# Patient Record
Sex: MALE | Race: WHITE | NOT HISPANIC OR LATINO | Employment: OTHER | URBAN - METROPOLITAN AREA
[De-identification: names, ages, dates, MRNs, and addresses within clinical notes are randomized per-mention and may not be internally consistent; named-entity substitution may affect disease eponyms.]

---

## 2017-03-27 LAB — HEP C VIRUS AB: <0.1

## 2019-10-22 LAB
CHOLESTEROL, TOTAL: 207
HDLC SERPL-MCNC: 40 MG/DL
LDLC SERPL CALC-MCNC: 137 MG/DL (ref 0–160)
TRIGL SERPL-MCNC: 163 MG/DL

## 2020-01-27 ENCOUNTER — LAB VISIT (OUTPATIENT)
Dept: LAB | Facility: HOSPITAL | Age: 70
End: 2020-01-27
Attending: UROLOGY
Payer: MEDICARE

## 2020-01-27 ENCOUNTER — OFFICE VISIT (OUTPATIENT)
Dept: UROLOGY | Facility: CLINIC | Age: 70
End: 2020-01-27
Payer: MEDICARE

## 2020-01-27 VITALS — HEIGHT: 69 IN | WEIGHT: 278.25 LBS | BODY MASS INDEX: 41.21 KG/M2

## 2020-01-27 DIAGNOSIS — N13.8 ENLARGED PROSTATE WITH URINARY OBSTRUCTION: Primary | ICD-10-CM

## 2020-01-27 DIAGNOSIS — Z12.5 SCREENING FOR PROSTATE CANCER: ICD-10-CM

## 2020-01-27 DIAGNOSIS — N40.1 ENLARGED PROSTATE WITH URINARY OBSTRUCTION: Primary | ICD-10-CM

## 2020-01-27 DIAGNOSIS — Z80.42 FAMILY HISTORY OF PROSTATE CANCER: ICD-10-CM

## 2020-01-27 PROCEDURE — 99204 PR OFFICE/OUTPT VISIT, NEW, LEVL IV, 45-59 MIN: ICD-10-PCS | Mod: S$PBB,,, | Performed by: UROLOGY

## 2020-01-27 PROCEDURE — 1159F PR MEDICATION LIST DOCUMENTED IN MEDICAL RECORD: ICD-10-PCS | Mod: ,,, | Performed by: UROLOGY

## 2020-01-27 PROCEDURE — 99999 PR PBB SHADOW E&M-NEW PATIENT-LVL II: CPT | Mod: PBBFAC,,, | Performed by: UROLOGY

## 2020-01-27 PROCEDURE — 1126F AMNT PAIN NOTED NONE PRSNT: CPT | Mod: ,,, | Performed by: UROLOGY

## 2020-01-27 PROCEDURE — 99202 OFFICE O/P NEW SF 15 MIN: CPT | Mod: PBBFAC,PO | Performed by: UROLOGY

## 2020-01-27 PROCEDURE — 84153 ASSAY OF PSA TOTAL: CPT

## 2020-01-27 PROCEDURE — 1126F PR PAIN SEVERITY QUANTIFIED, NO PAIN PRESENT: ICD-10-PCS | Mod: ,,, | Performed by: UROLOGY

## 2020-01-27 PROCEDURE — 99204 OFFICE O/P NEW MOD 45 MIN: CPT | Mod: S$PBB,,, | Performed by: UROLOGY

## 2020-01-27 PROCEDURE — 36415 COLL VENOUS BLD VENIPUNCTURE: CPT | Mod: PO

## 2020-01-27 PROCEDURE — 1159F MED LIST DOCD IN RCRD: CPT | Mod: ,,, | Performed by: UROLOGY

## 2020-01-27 PROCEDURE — 99999 PR PBB SHADOW E&M-NEW PATIENT-LVL II: ICD-10-PCS | Mod: PBBFAC,,, | Performed by: UROLOGY

## 2020-01-27 RX ORDER — TAMSULOSIN HYDROCHLORIDE 0.4 MG/1
CAPSULE ORAL
COMMUNITY
Start: 2020-01-13 | End: 2020-01-27 | Stop reason: SDUPTHER

## 2020-01-27 RX ORDER — TAMSULOSIN HYDROCHLORIDE 0.4 MG/1
CAPSULE ORAL
Qty: 90 CAPSULE | Refills: 3 | Status: SHIPPED | OUTPATIENT
Start: 2020-01-27 | End: 2021-01-13

## 2020-01-27 RX ORDER — AMLODIPINE BESYLATE 10 MG/1
TABLET ORAL
COMMUNITY
Start: 2020-01-13 | End: 2020-01-27

## 2020-01-27 RX ORDER — LISINOPRIL AND HYDROCHLOROTHIAZIDE 12.5; 2 MG/1; MG/1
1 TABLET ORAL DAILY
COMMUNITY
End: 2021-02-01 | Stop reason: SDUPTHER

## 2020-01-27 RX ORDER — DAPAGLIFLOZIN 5 MG/1
5 TABLET, FILM COATED ORAL DAILY
COMMUNITY
End: 2021-07-16

## 2020-01-27 RX ORDER — ATORVASTATIN CALCIUM 40 MG/1
TABLET, FILM COATED ORAL
COMMUNITY
Start: 2020-01-16 | End: 2020-10-05 | Stop reason: SDUPTHER

## 2020-01-27 RX ORDER — FINASTERIDE 5 MG/1
5 TABLET, FILM COATED ORAL DAILY
Qty: 90 TABLET | Refills: 3 | Status: SHIPPED | OUTPATIENT
Start: 2020-01-27 | End: 2021-01-17

## 2020-01-27 RX ORDER — LEVOTHYROXINE SODIUM 150 UG/1
150 TABLET ORAL
COMMUNITY
End: 2021-02-10 | Stop reason: SDUPTHER

## 2020-01-27 RX ORDER — AMLODIPINE BESYLATE 10 MG/1
10 TABLET ORAL DAILY
COMMUNITY
End: 2020-11-13 | Stop reason: SDUPTHER

## 2020-01-27 RX ORDER — FINASTERIDE 5 MG/1
5 TABLET, FILM COATED ORAL DAILY
COMMUNITY
End: 2020-01-27 | Stop reason: SDUPTHER

## 2020-01-27 RX ORDER — METFORMIN HYDROCHLORIDE 1000 MG/1
1000 TABLET, FILM COATED, EXTENDED RELEASE ORAL
COMMUNITY
End: 2020-10-20 | Stop reason: SDUPTHER

## 2020-01-27 RX ORDER — ALLOPURINOL 100 MG/1
100 TABLET ORAL DAILY
COMMUNITY
End: 2021-04-14 | Stop reason: SDUPTHER

## 2020-01-27 RX ORDER — CLOTRIMAZOLE AND BETAMETHASONE DIPROPIONATE 10; .64 MG/G; MG/G
CREAM TOPICAL 2 TIMES DAILY
COMMUNITY

## 2020-01-27 RX ORDER — GLIPIZIDE 10 MG/1
5 TABLET, FILM COATED, EXTENDED RELEASE ORAL
COMMUNITY
End: 2021-09-14 | Stop reason: SDUPTHER

## 2020-01-27 RX ORDER — PEN NEEDLE, DIABETIC 30 GX3/16"
NEEDLE, DISPOSABLE MISCELLANEOUS
COMMUNITY
End: 2020-09-29 | Stop reason: SDUPTHER

## 2020-01-27 RX ORDER — METOPROLOL SUCCINATE 200 MG/1
200 TABLET, EXTENDED RELEASE ORAL DAILY
COMMUNITY
End: 2021-03-04 | Stop reason: SDUPTHER

## 2020-01-27 NOTE — PROGRESS NOTES
Subjective:       Patient ID: Tal Maldonado is a 69 y.o. male.    Chief Complaint: Annual Exam    HPI     69-year-old with BPH.  He has been maintained on Flomax and finasteride for greater than 10 years he estimates.  He just relocated from [place] and he is here to establish care.  He needs refill of his medications.  He does have a family history of prostate cancer.  His brother had a radical prostatectomy in the past.  His last PSA was less than 1 but that it has been over a year ago.  When he takes his Flomax and finasteride he has no bothersome urinary symptoms.  He denies hematuria and dysuria.  He has no previous urinary tract infections.    Past Medical History:   Diagnosis Date    BPH (benign prostatic hyperplasia)     Diabetes mellitus     Hypertension        Past Surgical History:   Procedure Laterality Date    APPENDECTOMY      HERNIA REPAIR      Umbilical    THYROIDECTOMY      TONSILLECTOMY           Current Outpatient Medications:     allopurinol (ZYLOPRIM) 100 MG tablet, Take 100 mg by mouth once daily., Disp: , Rfl:     amLODIPine (NORVASC) 10 MG tablet, Take 10 mg by mouth once daily., Disp: , Rfl:     atorvastatin (LIPITOR) 40 MG tablet, TK 1 T PO D, Disp: , Rfl:     blood sugar diagnostic (FREESTYLE LITE STRIPS MISC), by Misc.(Non-Drug; Combo Route) route., Disp: , Rfl:     clotrimazole-betamethasone 1-0.05% (LOTRISONE) cream, Apply topically 2 (two) times daily., Disp: , Rfl:     dapagliflozin (FARXIGA) 5 mg Tab tablet, Take 5 mg by mouth once daily., Disp: , Rfl:     finasteride (PROSCAR) 5 mg tablet, Take 1 tablet (5 mg total) by mouth once daily., Disp: 90 tablet, Rfl: 3    glipiZIDE (GLUCOTROL) 10 MG TR24, Take 5 mg by mouth daily with breakfast., Disp: , Rfl:     LANCETS MISC, by Misc.(Non-Drug; Combo Route) route., Disp: , Rfl:     levothyroxine (SYNTHROID) 150 MCG tablet, Take 150 mcg by mouth before breakfast., Disp: , Rfl:     liraglutide 0.6 mg/0.1 mL, 18 mg/3 mL,  "subq PNIJ (VICTOZA 3-MARIO) 0.6 mg/0.1 mL (18 mg/3 mL) PnIj, Inject 0.6 mg into the skin once daily., Disp: , Rfl:     lisinopril-hydrochlorothiazide (PRINZIDE,ZESTORETIC) 20-12.5 mg per tablet, Take 1 tablet by mouth once daily., Disp: , Rfl:     metFORMIN (GLUMETZA) 1000 MG (MOD) 24 hr tablet, Take 1,000 mg by mouth daily with breakfast., Disp: , Rfl:     metoprolol succinate (TOPROL-XL) 200 MG 24 hr tablet, Take 200 mg by mouth once daily., Disp: , Rfl:     pen needle, diabetic 32 gauge x 5/32" Ndle, by Misc.(Non-Drug; Combo Route) route., Disp: , Rfl:     tamsulosin (FLOMAX) 0.4 mg Cap, TK ONE C PO D 30 MIN AFTER A MEAL, Disp: 90 capsule, Rfl: 3    Review of Systems   Constitutional: Negative for fever.   Eyes: Negative for visual disturbance.   Respiratory: Negative for shortness of breath.    Cardiovascular: Negative for chest pain.   Gastrointestinal: Negative for nausea.   Genitourinary: Negative for dysuria and hematuria.   Musculoskeletal: Negative for gait problem.   Skin: Negative for rash.   Neurological: Negative for seizures.   Psychiatric/Behavioral: Negative for confusion.       Objective:      Physical Exam   Constitutional: He is oriented to person, place, and time. He appears well-developed and well-nourished.   HENT:   Head: Normocephalic and atraumatic.   Eyes: Conjunctivae are normal.   Cardiovascular: Normal rate.   Pulmonary/Chest: Effort normal.   Abdominal: Soft. Hernia confirmed negative in the right inguinal area and confirmed negative in the left inguinal area.   Genitourinary: Testes normal and penis normal. Rectal exam shows no mass and anal tone normal. Prostate is enlarged (40g, s/s/a). Prostate is not tender.   Musculoskeletal: Normal range of motion.   Neurological: He is alert and oriented to person, place, and time.   Skin: Skin is warm and dry. No rash noted.   Psychiatric: He has a normal mood and affect.   Vitals reviewed.      Assessment:       1. Enlarged prostate with " urinary obstruction    2. Screening for prostate cancer    3. Family history of prostate cancer        Plan:       Enlarged prostate with urinary obstruction    Screening for prostate cancer  -     PSA, Screening; Future; Expected date: 01/27/2020    Family history of prostate cancer    Other orders  -     finasteride (PROSCAR) 5 mg tablet; Take 1 tablet (5 mg total) by mouth once daily.  Dispense: 90 tablet; Refill: 3  -     tamsulosin (FLOMAX) 0.4 mg Cap; TK ONE C PO D 30 MIN AFTER A MEAL  Dispense: 90 capsule; Refill: 3      continue Flomax and finasteride.  Update PSA.  Follow-up 1 year

## 2020-01-28 LAB — COMPLEXED PSA SERPL-MCNC: 0.52 NG/ML (ref 0–4)

## 2020-04-01 ENCOUNTER — PATIENT OUTREACH (OUTPATIENT)
Dept: ADMINISTRATIVE | Facility: HOSPITAL | Age: 70
End: 2020-04-01

## 2020-04-01 NOTE — PROGRESS NOTES
Chart review completed 04/01/2020.  Care Everywhere updates requested and reviewed.  Immunizations reconciled. Media reviewed.       Not in lab angela

## 2020-04-15 ENCOUNTER — PATIENT MESSAGE (OUTPATIENT)
Dept: FAMILY MEDICINE | Facility: CLINIC | Age: 70
End: 2020-04-15

## 2020-04-15 ENCOUNTER — TELEPHONE (OUTPATIENT)
Dept: FAMILY MEDICINE | Facility: CLINIC | Age: 70
End: 2020-04-15

## 2020-04-15 NOTE — TELEPHONE ENCOUNTER
Called pt, he would like to see dr simon face to face since first time meeting him and we rescheduled.

## 2020-04-15 NOTE — TELEPHONE ENCOUNTER
----- Message from Nigel Tanner sent at 4/15/2020  2:46 PM CDT -----  Contact: pt  Pt is returning call to nurse, call placed to pod no answer   Call Back#[phone]  Thanks

## 2020-05-21 ENCOUNTER — PATIENT OUTREACH (OUTPATIENT)
Dept: ADMINISTRATIVE | Facility: HOSPITAL | Age: 70
End: 2020-05-21

## 2020-05-21 NOTE — PROGRESS NOTES
Chart review completed 05/21/2020.  Care Everywhere updates requested and reviewed.  Immunizations reconciled. Media reviewed.     Lab angela, and quest reviewed.   updated with external Hep C and Lipid panel report.   Message sent to patient's portal.    Health Maintenance Due   Topic Date Due    Hemoglobin A1c  1950    Foot Exam  07/03/1960    Eye Exam  07/03/1960    TETANUS VACCINE  07/03/1968    Shingles Vaccine (1 of 2) 07/03/2000    Colonoscopy  07/03/2000    Pneumococcal Vaccine (65+ Low/Medium Risk) (1 of 2 - PCV13) 07/03/2015

## 2020-06-04 ENCOUNTER — LAB VISIT (OUTPATIENT)
Dept: LAB | Facility: HOSPITAL | Age: 70
End: 2020-06-04
Attending: INTERNAL MEDICINE
Payer: MEDICARE

## 2020-06-04 ENCOUNTER — OFFICE VISIT (OUTPATIENT)
Dept: FAMILY MEDICINE | Facility: CLINIC | Age: 70
End: 2020-06-04
Payer: MEDICARE

## 2020-06-04 VITALS
SYSTOLIC BLOOD PRESSURE: 142 MMHG | HEART RATE: 82 BPM | BODY MASS INDEX: 41.9 KG/M2 | DIASTOLIC BLOOD PRESSURE: 76 MMHG | WEIGHT: 283.75 LBS | OXYGEN SATURATION: 95 %

## 2020-06-04 DIAGNOSIS — E66.01 MORBID OBESITY DUE TO EXCESS CALORIES: ICD-10-CM

## 2020-06-04 DIAGNOSIS — G47.33 OSA ON CPAP: ICD-10-CM

## 2020-06-04 DIAGNOSIS — M10.49 OTHER SECONDARY ACUTE GOUT OF MULTIPLE SITES: ICD-10-CM

## 2020-06-04 DIAGNOSIS — Z00.00 WELLNESS EXAMINATION: Primary | ICD-10-CM

## 2020-06-04 DIAGNOSIS — E78.49 OTHER HYPERLIPIDEMIA: ICD-10-CM

## 2020-06-04 DIAGNOSIS — M19.049 ARTHRITIS OF HAND: ICD-10-CM

## 2020-06-04 DIAGNOSIS — N40.0 BENIGN PROSTATIC HYPERPLASIA WITHOUT LOWER URINARY TRACT SYMPTOMS: ICD-10-CM

## 2020-06-04 DIAGNOSIS — D12.6 ADENOMATOUS POLYP OF COLON, UNSPECIFIED PART OF COLON: ICD-10-CM

## 2020-06-04 DIAGNOSIS — H90.42 SENSORINEURAL HEARING LOSS (SNHL) OF LEFT EAR WITH UNRESTRICTED HEARING OF RIGHT EAR: ICD-10-CM

## 2020-06-04 DIAGNOSIS — I80.00 SUPERFICIAL THROMBOPHLEBITIS OF LOWER EXTREMITY, UNSPECIFIED LATERALITY: ICD-10-CM

## 2020-06-04 DIAGNOSIS — I10 ESSENTIAL HYPERTENSION: ICD-10-CM

## 2020-06-04 DIAGNOSIS — E11.9 TYPE 2 DIABETES MELLITUS WITHOUT COMPLICATION, WITHOUT LONG-TERM CURRENT USE OF INSULIN: ICD-10-CM

## 2020-06-04 PROBLEM — M10.9 ACUTE GOUT OF MULTIPLE SITES: Status: ACTIVE | Noted: 2020-06-04

## 2020-06-04 LAB
ALBUMIN SERPL BCP-MCNC: 4.1 G/DL (ref 3.5–5.2)
ALBUMIN/CREAT UR: 15.9 UG/MG (ref 0–30)
ALP SERPL-CCNC: 64 U/L (ref 55–135)
ALT SERPL W/O P-5'-P-CCNC: 56 U/L (ref 10–44)
ANION GAP SERPL CALC-SCNC: 10 MMOL/L (ref 8–16)
AST SERPL-CCNC: 33 U/L (ref 10–40)
BASOPHILS # BLD AUTO: 0.05 K/UL (ref 0–0.2)
BASOPHILS NFR BLD: 0.8 % (ref 0–1.9)
BILIRUB SERPL-MCNC: 1.1 MG/DL (ref 0.1–1)
BUN SERPL-MCNC: 15 MG/DL (ref 8–23)
CALCIUM SERPL-MCNC: 9.6 MG/DL (ref 8.7–10.5)
CHLORIDE SERPL-SCNC: 103 MMOL/L (ref 95–110)
CHOLEST SERPL-MCNC: 154 MG/DL (ref 120–199)
CHOLEST/HDLC SERPL: 4.3 {RATIO} (ref 2–5)
CO2 SERPL-SCNC: 28 MMOL/L (ref 23–29)
CREAT SERPL-MCNC: 1 MG/DL (ref 0.5–1.4)
CREAT UR-MCNC: 69 MG/DL (ref 23–375)
DIFFERENTIAL METHOD: ABNORMAL
EOSINOPHIL # BLD AUTO: 0.1 K/UL (ref 0–0.5)
EOSINOPHIL NFR BLD: 1.6 % (ref 0–8)
ERYTHROCYTE [DISTWIDTH] IN BLOOD BY AUTOMATED COUNT: 12.8 % (ref 11.5–14.5)
EST. GFR  (AFRICAN AMERICAN): >60 ML/MIN/1.73 M^2
EST. GFR  (NON AFRICAN AMERICAN): >60 ML/MIN/1.73 M^2
GLUCOSE SERPL-MCNC: 173 MG/DL (ref 70–110)
HCT VFR BLD AUTO: 49.2 % (ref 40–54)
HDLC SERPL-MCNC: 36 MG/DL (ref 40–75)
HDLC SERPL: 23.4 % (ref 20–50)
HGB BLD-MCNC: 16.1 G/DL (ref 14–18)
IMM GRANULOCYTES # BLD AUTO: 0.04 K/UL (ref 0–0.04)
IMM GRANULOCYTES NFR BLD AUTO: 0.7 % (ref 0–0.5)
LDLC SERPL CALC-MCNC: 78.4 MG/DL (ref 63–159)
LYMPHOCYTES # BLD AUTO: 1.3 K/UL (ref 1–4.8)
LYMPHOCYTES NFR BLD: 20.6 % (ref 18–48)
MCH RBC QN AUTO: 31 PG (ref 27–31)
MCHC RBC AUTO-ENTMCNC: 32.7 G/DL (ref 32–36)
MCV RBC AUTO: 95 FL (ref 82–98)
MICROALBUMIN UR DL<=1MG/L-MCNC: 11 UG/ML
MONOCYTES # BLD AUTO: 0.5 K/UL (ref 0.3–1)
MONOCYTES NFR BLD: 8.6 % (ref 4–15)
NEUTROPHILS # BLD AUTO: 4.1 K/UL (ref 1.8–7.7)
NEUTROPHILS NFR BLD: 67.7 % (ref 38–73)
NONHDLC SERPL-MCNC: 118 MG/DL
NRBC BLD-RTO: 0 /100 WBC
PLATELET # BLD AUTO: 120 K/UL (ref 150–350)
PMV BLD AUTO: 11.3 FL (ref 9.2–12.9)
POTASSIUM SERPL-SCNC: 3.9 MMOL/L (ref 3.5–5.1)
PROT SERPL-MCNC: 6.6 G/DL (ref 6–8.4)
RBC # BLD AUTO: 5.19 M/UL (ref 4.6–6.2)
SARS-COV-2 IGG SERPLBLD QL IA.RAPID: NEGATIVE
SODIUM SERPL-SCNC: 141 MMOL/L (ref 136–145)
TRIGL SERPL-MCNC: 198 MG/DL (ref 30–150)
TSH SERPL DL<=0.005 MIU/L-ACNC: 1.08 UIU/ML (ref 0.4–4)
URATE SERPL-MCNC: 5.8 MG/DL (ref 3.4–7)
WBC # BLD AUTO: 6.07 K/UL (ref 3.9–12.7)

## 2020-06-04 PROCEDURE — 36415 COLL VENOUS BLD VENIPUNCTURE: CPT | Mod: PO

## 2020-06-04 PROCEDURE — 83036 HEMOGLOBIN GLYCOSYLATED A1C: CPT

## 2020-06-04 PROCEDURE — 99999 PR PBB SHADOW E&M-EST. PATIENT-LVL III: CPT | Mod: PBBFAC,,, | Performed by: INTERNAL MEDICINE

## 2020-06-04 PROCEDURE — 99999 PR PBB SHADOW E&M-EST. PATIENT-LVL III: ICD-10-PCS | Mod: PBBFAC,,, | Performed by: INTERNAL MEDICINE

## 2020-06-04 PROCEDURE — 80061 LIPID PANEL: CPT

## 2020-06-04 PROCEDURE — 85025 COMPLETE CBC W/AUTO DIFF WBC: CPT

## 2020-06-04 PROCEDURE — 82043 UR ALBUMIN QUANTITATIVE: CPT

## 2020-06-04 PROCEDURE — 86769 SARS-COV-2 COVID-19 ANTIBODY: CPT

## 2020-06-04 PROCEDURE — 84550 ASSAY OF BLOOD/URIC ACID: CPT

## 2020-06-04 PROCEDURE — 84443 ASSAY THYROID STIM HORMONE: CPT

## 2020-06-04 PROCEDURE — 99214 OFFICE O/P EST MOD 30 MIN: CPT | Mod: S$PBB,ICN,, | Performed by: INTERNAL MEDICINE

## 2020-06-04 PROCEDURE — 99213 OFFICE O/P EST LOW 20 MIN: CPT | Mod: PBBFAC,PO | Performed by: INTERNAL MEDICINE

## 2020-06-04 PROCEDURE — 80053 COMPREHEN METABOLIC PANEL: CPT

## 2020-06-04 PROCEDURE — 99214 PR OFFICE/OUTPT VISIT, EST, LEVL IV, 30-39 MIN: ICD-10-PCS | Mod: S$PBB,ICN,, | Performed by: INTERNAL MEDICINE

## 2020-06-04 RX ORDER — ASPIRIN 81 MG/1
81 TABLET ORAL DAILY
Refills: 0
Start: 2020-06-04 | End: 2026-01-29

## 2020-06-04 RX ORDER — INDOMETHACIN 50 MG/1
50 CAPSULE ORAL 2 TIMES DAILY PRN
Qty: 30 CAPSULE | Refills: 1 | Status: SHIPPED | OUTPATIENT
Start: 2020-06-04

## 2020-06-04 NOTE — PROGRESS NOTES
Patient ID: Tal Maldonado     Chief Complaint:   Chief Complaint   Patient presents with    Establish Care     needing labs done         HPI: New Patient to me. Was seen by Dr. Rivera for BPH and family history of Prostate cancer in brother. Wellness exam today and doing well. Needs labs and health maintenance. He has history of white coat hypertension. He's up to date on many shots but I want to see his previous PCP's labs and notes.     Review of Systems   Constitutional: Negative.    HENT: Negative.    Eyes: Negative.    Respiratory: Negative.    Cardiovascular: Negative.    Gastrointestinal: Negative.    Endocrine: Negative.    Genitourinary: Negative.    Musculoskeletal: Negative.    Skin: Negative.    Allergic/Immunologic: Negative.    Neurological: Negative.    Hematological: Negative.    Psychiatric/Behavioral: Negative.           Objective:      Physical Exam   Physical Exam   Constitutional: He is oriented to person, place, and time. He appears well-developed and well-nourished.   HENT:   Head: Normocephalic and atraumatic.   Nose: Nose normal.   Mouth/Throat: Oropharynx is clear and moist.   Eyes: Pupils are equal, round, and reactive to light. Conjunctivae and EOM are normal.   Neck: Normal range of motion. Neck supple.   Cardiovascular: Normal rate, regular rhythm, normal heart sounds and intact distal pulses.   Pulmonary/Chest: Effort normal and breath sounds normal.   Abdominal: Soft. Bowel sounds are normal.   Musculoskeletal: Normal range of motion.   Neurological: He is alert and oriented to person, place, and time.   Skin: Skin is warm and dry. Capillary refill takes less than 2 seconds.   Psychiatric: He has a normal mood and affect. His behavior is normal. Judgment and thought content normal.   Nursing note and vitals reviewed.      Current Outpatient Medications:     allopurinol (ZYLOPRIM) 100 MG tablet, Take 100 mg by mouth once daily., Disp: , Rfl:     amLODIPine (NORVASC) 10 MG tablet,  "Take 10 mg by mouth once daily., Disp: , Rfl:     atorvastatin (LIPITOR) 40 MG tablet, TK 1 T PO D, Disp: , Rfl:     blood sugar diagnostic (FREESTYLE LITE STRIPS MISC), by Misc.(Non-Drug; Combo Route) route., Disp: , Rfl:     clotrimazole-betamethasone 1-0.05% (LOTRISONE) cream, Apply topically 2 (two) times daily., Disp: , Rfl:     dapagliflozin (FARXIGA) 5 mg Tab tablet, Take 5 mg by mouth once daily., Disp: , Rfl:     finasteride (PROSCAR) 5 mg tablet, Take 1 tablet (5 mg total) by mouth once daily., Disp: 90 tablet, Rfl: 3    glipiZIDE (GLUCOTROL) 10 MG TR24, Take 5 mg by mouth daily with breakfast., Disp: , Rfl:     LANCETS MISC, by Misc.(Non-Drug; Combo Route) route., Disp: , Rfl:     levothyroxine (SYNTHROID) 150 MCG tablet, Take 150 mcg by mouth before breakfast., Disp: , Rfl:     liraglutide 0.6 mg/0.1 mL, 18 mg/3 mL, subq PNIJ (VICTOZA 3-MARIO) 0.6 mg/0.1 mL (18 mg/3 mL) PnIj, Inject 0.6 mg into the skin once daily., Disp: , Rfl:     lisinopril-hydrochlorothiazide (PRINZIDE,ZESTORETIC) 20-12.5 mg per tablet, Take 1 tablet by mouth once daily., Disp: , Rfl:     metFORMIN (GLUMETZA) 1000 MG (MOD) 24 hr tablet, Take 1,000 mg by mouth daily with breakfast., Disp: , Rfl:     metoprolol succinate (TOPROL-XL) 200 MG 24 hr tablet, Take 200 mg by mouth once daily., Disp: , Rfl:     pen needle, diabetic 32 gauge x 5/32" Ndle, by Misc.(Non-Drug; Combo Route) route., Disp: , Rfl:     tamsulosin (FLOMAX) 0.4 mg Cap, TK ONE C PO D 30 MIN AFTER A MEAL, Disp: 90 capsule, Rfl: 3    aspirin (ECOTRIN) 81 MG EC tablet, Take 1 tablet (81 mg total) by mouth once daily., Disp: , Rfl: 0         Vitals:   Vitals:    06/04/20 0801   BP: (!) 142/76   Patient Position: Sitting   Pulse: 82   SpO2: 95%   Weight: 128.7 kg (283 lb 11.7 oz)      Assessment:       Patient Active Problem List    Diagnosis Date Noted    Acute gout of multiple sites 06/04/2020    Morbid obesity due to excess calories 06/04/2020    BPH (benign " prostatic hyperplasia)     Diabetes mellitus     Hypertension     Other hyperlipidemia     Sensorineural hearing loss (SNHL) of left ear     WINSOME on CPAP     Colon polyps     Superficial thrombophlebitis     Arthritis of hand           Plan:       Tal Maldonado  was seen today for follow-up and may need lab work.    Diagnoses and all orders for this visit:    Tal was seen today for establish care.    Diagnoses and all orders for this visit:    Wellness examination    Type 2 diabetes mellitus without complication, without long-term current use of insulin  -     Ambulatory referral/consult to Podiatry; Future  -     Hemoglobin A1C; Future  -     Microalbumin/creatinine urine ratio; Future  -     TSH; Future    Benign prostatic hyperplasia without lower urinary tract symptoms  -     COVID-19 (SARS CoV-2) IgG Antibody; Future    Essential hypertension  -     CBC auto differential; Future  -     Comprehensive metabolic panel; Future    Other hyperlipidemia  -     Lipid Panel; Future    Sensorineural hearing loss (SNHL) of left ear with unrestricted hearing of right ear    WINSOME on CPAP  Using it and getting good benefit     Adenomatous polyp of colon, unspecified part of colon  Needs Colon next year     Other secondary acute gout of multiple sites  -     Uric acid; Future    Superficial thrombophlebitis of lower extremity, unspecified laterality  Take Aspirin 81 mg / day     Arthritis of hand    Morbid obesity due to excess calories  Monitor as he exercises    Other orders  -     aspirin (ECOTRIN) 81 MG EC tablet; Take 1 tablet (81 mg total) by mouth once daily.

## 2020-06-05 LAB
ESTIMATED AVG GLUCOSE: 151 MG/DL (ref 68–131)
HBA1C MFR BLD HPLC: 6.9 % (ref 4–5.6)

## 2020-06-07 DIAGNOSIS — D69.6 THROMBOCYTOPENIA: Primary | ICD-10-CM

## 2020-06-08 ENCOUNTER — OFFICE VISIT (OUTPATIENT)
Dept: PODIATRY | Facility: CLINIC | Age: 70
End: 2020-06-08
Payer: MEDICARE

## 2020-06-08 ENCOUNTER — TELEPHONE (OUTPATIENT)
Dept: FAMILY MEDICINE | Facility: CLINIC | Age: 70
End: 2020-06-08

## 2020-06-08 VITALS — WEIGHT: 283.75 LBS | BODY MASS INDEX: 42.03 KG/M2 | HEIGHT: 69 IN

## 2020-06-08 DIAGNOSIS — E66.01 MORBID OBESITY WITH BMI OF 40.0-44.9, ADULT: ICD-10-CM

## 2020-06-08 DIAGNOSIS — M20.41 HAMMER TOES OF BOTH FEET: ICD-10-CM

## 2020-06-08 DIAGNOSIS — Q82.8 POROKERATOSIS: ICD-10-CM

## 2020-06-08 DIAGNOSIS — E11.42 DIABETIC POLYNEUROPATHY ASSOCIATED WITH TYPE 2 DIABETES MELLITUS: Primary | ICD-10-CM

## 2020-06-08 DIAGNOSIS — M20.42 HAMMER TOES OF BOTH FEET: ICD-10-CM

## 2020-06-08 DIAGNOSIS — B35.1 ONYCHOMYCOSIS DUE TO DERMATOPHYTE: ICD-10-CM

## 2020-06-08 PROCEDURE — 99203 PR OFFICE/OUTPT VISIT, NEW, LEVL III, 30-44 MIN: ICD-10-PCS | Mod: 25,S$PBB,, | Performed by: PODIATRIST

## 2020-06-08 PROCEDURE — 11721 PR DEBRIDEMENT OF NAILS, 6 OR MORE: ICD-10-PCS | Mod: 59,Q9,S$PBB, | Performed by: PODIATRIST

## 2020-06-08 PROCEDURE — 11056 PR TRIM BENIGN HYPERKERATOTIC SKIN LESION,2-4: ICD-10-PCS | Mod: Q9,S$PBB,, | Performed by: PODIATRIST

## 2020-06-08 PROCEDURE — 11056 PARNG/CUTG B9 HYPRKR LES 2-4: CPT | Mod: PBBFAC,PN | Performed by: PODIATRIST

## 2020-06-08 PROCEDURE — 99213 OFFICE O/P EST LOW 20 MIN: CPT | Mod: PBBFAC,PN | Performed by: PODIATRIST

## 2020-06-08 PROCEDURE — 99203 OFFICE O/P NEW LOW 30 MIN: CPT | Mod: 25,S$PBB,, | Performed by: PODIATRIST

## 2020-06-08 PROCEDURE — 11721 DEBRIDE NAIL 6 OR MORE: CPT | Mod: PBBFAC,PN,59 | Performed by: PODIATRIST

## 2020-06-08 PROCEDURE — 99999 PR PBB SHADOW E&M-EST. PATIENT-LVL III: CPT | Mod: PBBFAC,,, | Performed by: PODIATRIST

## 2020-06-08 PROCEDURE — 11721 DEBRIDE NAIL 6 OR MORE: CPT | Mod: 59,Q9,S$PBB, | Performed by: PODIATRIST

## 2020-06-08 PROCEDURE — 99999 PR PBB SHADOW E&M-EST. PATIENT-LVL III: ICD-10-PCS | Mod: PBBFAC,,, | Performed by: PODIATRIST

## 2020-06-08 PROCEDURE — 11056 PARNG/CUTG B9 HYPRKR LES 2-4: CPT | Mod: Q9,S$PBB,, | Performed by: PODIATRIST

## 2020-06-08 NOTE — TELEPHONE ENCOUNTER
Spoke with patient and gave lab results and scheduled u/s wed. the 10th of June. Patient states verbal understanding.

## 2020-06-08 NOTE — LETTER
June 8, 2020      Marlon Iglesias MD  [address] [facility] [address] [place] [address]           [facility]  [address] [place] [address]  Phone: [phone]          Patient: Tal Maldonado   MR Number: [id number]   YOB: 1950   Date of Visit: 6/8/2020       Dear Dr. Marlon Iglesias:    Thank you for referring Tal Maldonado to me for evaluation. Attached you will find relevant portions of my assessment and plan of care.    If you have questions, please do not hesitate to call me. I look forward to following Tal Maldonado along with you.    Sincerely,    Alex Olson, KANIKA    Enclosure  CC:  No Recipients    If you would like to receive this communication electronically, please contact [email or web address] or [phone] to request more information on [employer] Link access.    For providers and/or their staff who would like to refer a patient to [facility], please contact us through our one-stop-shop provider referral line, Toby Medina, at [phone].    If you feel you have received this communication in error or would no longer like to receive these types of communications, please e-mail [email or web address]

## 2020-06-08 NOTE — TELEPHONE ENCOUNTER
----- Message from Marlon Iglesias MD sent at 6/7/2020  4:44 PM CDT -----  Majority of labs are very good: diabetes mellitus Controlled and Lipids almost Controlled (lower saturated fat in diet). Gout Controlled. COVID-19 antibody negative, so he never had it. Platelets low and 1 liver enzyme elevated- check Right Upper Quadrant Ultrasound as I'm concerned for liver disease.

## 2020-06-08 NOTE — PROGRESS NOTES
Subjective:      Patient ID: Tal Maldonado is a 69 y.o. male.    Chief Complaint: Diabetes Mellitus ([place]) and Diabetic Foot Exam    Tal is a 69 y.o. male who presents to the clinic upon referral from Dr. Iglesias  for evaluation and treatment of diabetic feet. Tal has a past medical history of Arthritis of hand, BPH (benign prostatic hyperplasia), Colon polyps, Diabetes mellitus, Hypertension, WINSOME on CPAP, Other hyperlipidemia, Sensorineural hearing loss (SNHL) of left ear, and Superficial thrombophlebitis. Patient relates no major problem with feet. Only complaints today consist of toenails in need of trimming.  Denies being painful with wearing shoe gear.  Has not attempted to self trim.  Denies having overt neuropathy pain in the feet.  Denies any additional pedal complaints.    PCP: Marlon Iglesias MD    Date Last Seen by PCP: 6/20    Hemoglobin A1C   Date Value Ref Range Status   06/04/2020 6.9 (H) 4.0 - 5.6 % Final     Comment:     ADA Screening Guidelines:  5.7-6.4%  Consistent with prediabetes  >or=6.5%  Consistent with diabetes  High levels of fetal hemoglobin interfere with the HbA1C  assay. Heterozygous hemoglobin variants (HbS, HgC, etc)do  not significantly interfere with this assay.   However, presence of multiple variants may affect accuracy.             Past Medical History:   Diagnosis Date    Arthritis of hand     BPH (benign prostatic hyperplasia)     Colon polyps     Diabetes mellitus     Hypertension     WINSOME on CPAP     Other hyperlipidemia     Sensorineural hearing loss (SNHL) of left ear     Superficial thrombophlebitis        Past Surgical History:   Procedure Laterality Date    APPENDECTOMY      DUPUYTREN CONTRACTURE RELEASE Bilateral     HERNIA REPAIR      Umbilical    TONSILLECTOMY         Family History   Problem Relation Age of Onset    Hypertension Mother     Diabetes Mother     Emphysema Father     Prostate cancer Brother        Social History     Socioeconomic  History    Marital status:      Spouse name: Not on file    Number of children: Not on file    Years of education: Not on file    Highest education level: Not on file   Occupational History    Occupation: Wine Distributor    Social Needs    Financial resource strain: Not hard at all    Food insecurity:     Worry: Never true     Inability: Never true    Transportation needs:     Medical: No     Non-medical: No   Tobacco Use    Smoking status: Never Smoker    Smokeless tobacco: Never Used   Substance and Sexual Activity    Alcohol use: Yes     Alcohol/week: 7.0 standard drinks     Types: 7 Glasses of wine per week     Frequency: 4 or more times a week     Drinks per session: 3 or 4     Binge frequency: Never    Drug use: Never    Sexual activity: Yes     Partners: Female   Lifestyle    Physical activity:     Days per week: 4 days     Minutes per session: 40 min    Stress: Not at all   Relationships    Social connections:     Talks on phone: More than three times a week     Gets together: More than three times a week     Attends Mandaeism service: Not on file     Active member of club or organization: Yes     Attends meetings of clubs or organizations: More than 4 times per year     Relationship status:    Other Topics Concern    Not on file   Social History Narrative    Not on file       Current Outpatient Medications   Medication Sig Dispense Refill    allopurinol (ZYLOPRIM) 100 MG tablet Take 100 mg by mouth once daily.      amLODIPine (NORVASC) 10 MG tablet Take 10 mg by mouth once daily.      aspirin (ECOTRIN) 81 MG EC tablet Take 1 tablet (81 mg total) by mouth once daily.  0    atorvastatin (LIPITOR) 40 MG tablet TK 1 T PO D      blood sugar diagnostic (FREESTYLE LITE STRIPS MISC) by Misc.(Non-Drug; Combo Route) route.      clotrimazole-betamethasone 1-0.05% (LOTRISONE) cream Apply topically 2 (two) times daily.      dapagliflozin (FARXIGA) 5 mg Tab tablet Take 5 mg by  "mouth once daily.      finasteride (PROSCAR) 5 mg tablet Take 1 tablet (5 mg total) by mouth once daily. 90 tablet 3    glipiZIDE (GLUCOTROL) 10 MG TR24 Take 5 mg by mouth daily with breakfast.      indomethacin (INDOCIN) 50 MG capsule Take 1 capsule (50 mg total) by mouth 2 (two) times daily as needed (gout flare). 30 capsule 1    LANCETS MISC by Misc.(Non-Drug; Combo Route) route.      levothyroxine (SYNTHROID) 150 MCG tablet Take 150 mcg by mouth before breakfast.      liraglutide 0.6 mg/0.1 mL, 18 mg/3 mL, subq PNIJ (VICTOZA 3-MARIO) 0.6 mg/0.1 mL (18 mg/3 mL) PnIj Inject 0.6 mg into the skin once daily.      lisinopril-hydrochlorothiazide (PRINZIDE,ZESTORETIC) 20-12.5 mg per tablet Take 1 tablet by mouth once daily.      metFORMIN (GLUMETZA) 1000 MG (MOD) 24 hr tablet Take 1,000 mg by mouth daily with breakfast.      metoprolol succinate (TOPROL-XL) 200 MG 24 hr tablet Take 200 mg by mouth once daily.      pen needle, diabetic 32 gauge x 5/32" Ndle by Misc.(Non-Drug; Combo Route) route.      tamsulosin (FLOMAX) 0.4 mg Cap TK ONE C PO D 30 MIN AFTER A MEAL 90 capsule 3     No current facility-administered medications for this visit.        Review of patient's allergies indicates:  No Known Allergies      Review of Systems   Constitution: Negative for chills and fever.   Cardiovascular: Positive for leg swelling. Negative for claudication.   Skin: Positive for color change and nail changes.   Musculoskeletal: Positive for joint swelling. Negative for joint pain, muscle cramps, muscle weakness and myalgias.   Neurological: Negative for numbness and paresthesias.   Psychiatric/Behavioral: Negative for altered mental status.           Objective:      Physical Exam   Constitutional: He is oriented to person, place, and time. He appears well-developed and well-nourished. No distress.   Cardiovascular:   Pulses:       Dorsalis pedis pulses are 2+ on the right side, and 2+ on the left side.        Posterior " tibial pulses are 2+ on the right side, and 2+ on the left side.   CFT is < 3 seconds bilateral.  Pedal hair growth is present bilateral.  Mild varicosities noted bilateral.  Moderate non pitting lower extremity edema noted bilateral.  Toes are warm to touch bilateral.     Musculoskeletal: He exhibits edema. He exhibits no tenderness.   Muscle strength 5/5 in all muscle groups bilateral.  No tenderness nor crepitation with ROM of foot/ankle joints bilateral.  No tenderness with palpation of bilateral foot and ankle.  Bilateral pes planus foot type.  Bilateral semi-rigid contracture of toes 2-5.   Neurological: He is alert and oriented to person, place, and time. He has normal strength. A sensory deficit is present.   Protective sensation per Manly-Derick monofilament is intact bilateral.  Vibratory sensation is decreased bilateral.  Light touch is intact bilateral.   Skin: Skin is warm, dry and intact. Capillary refill takes 2 to 3 seconds. Lesion noted. No abrasion, no bruising, no burn, no ecchymosis, no laceration and no rash noted. He is not diaphoretic. No erythema. No pallor.   Pedal skin appears edematous bilateral.  Toenails x 10 appear thickened by 2 mm's elongated by 4 mm's, and discolored with subungual debris.  Porokeratoses noted to the Lt. Forefoot (3 lesions) and the Rt. Forefoot (1 lesion). Examination of the skin reveals no evidence of significant maceration, rashes, open lesions, suspicious appearing nevi or other concerning lesions.              Assessment:       Encounter Diagnoses   Name Primary?    Diabetic polyneuropathy associated with type 2 diabetes mellitus Yes    Onychomycosis due to dermatophyte     Hammer toes of both feet     Porokeratosis     Morbid obesity with BMI of 40.0-44.9, adult          Plan:       Tal was seen today for diabetes mellitus and diabetic foot exam.    Diagnoses and all orders for this visit:    Diabetic polyneuropathy associated with type 2 diabetes  mellitus  -     Ambulatory referral/consult to Podiatry    Onychomycosis due to dermatophyte    Hammer toes of both feet    Porokeratosis    Morbid obesity with BMI of 40.0-44.9, adult      I counseled the patient on his conditions, their implications and medical management.    Advised to continue wearing shoe gear that accommodates digital deformities.    Discussed the importance of diet and exercise as they pertain to diabetes management and maintaining a healthy BMI.    Shoe inspection. Diabetic Foot Education. Patient reminded of the importance of good nutrition and blood sugar control to help prevent podiatric complications of diabetes. Patient instructed on proper foot hygeine. We discussed wearing proper shoe gear, daily foot inspections, never walking without protective shoe gear, never putting sharp instruments to feet    With patient's permission, nails were aggressively reduced and debrided x 10  to their soft tissue attachment mechanically and with electric , removing all offending nail and debris.  With the patient's verbal consent, a sterile #15 scalpel was used to trim lesions x 4 down to smooth appearing skin without incident.  Patient relates relief following the procedure. She will continue to monitor the areas daily, inspect her feet, wear protective shoe gear when ambulatory, moisturizer to maintain skin integrity and follow in this office in approximately 2-3 months, sooner p.r.n.    Follow up in about 3 months (around 9/8/2020).    Alex Olson DPM

## 2020-06-10 ENCOUNTER — HOSPITAL ENCOUNTER (OUTPATIENT)
Dept: RADIOLOGY | Facility: HOSPITAL | Age: 70
Discharge: HOME | End: 2020-06-10
Attending: INTERNAL MEDICINE
Payer: MEDICARE

## 2020-06-10 DIAGNOSIS — D69.6 THROMBOCYTOPENIA: ICD-10-CM

## 2020-06-10 PROCEDURE — 76705 ECHO EXAM OF ABDOMEN: CPT | Mod: 26,,, | Performed by: RADIOLOGY

## 2020-06-10 PROCEDURE — 76705 ECHO EXAM OF ABDOMEN: CPT | Mod: TC,PO

## 2020-06-10 PROCEDURE — 76705 US ABDOMEN LIMITED: ICD-10-PCS | Mod: 26,,, | Performed by: RADIOLOGY

## 2020-06-16 ENCOUNTER — TELEPHONE (OUTPATIENT)
Dept: FAMILY MEDICINE | Facility: CLINIC | Age: 70
End: 2020-06-16

## 2020-06-16 DIAGNOSIS — R73.01 IMPAIRED FASTING GLUCOSE: ICD-10-CM

## 2020-06-16 DIAGNOSIS — D69.6 THROMBOCYTOPENIA: Primary | ICD-10-CM

## 2020-06-16 NOTE — TELEPHONE ENCOUNTER
[facility] pharmacy sent a fax stating indomethacin cap 50 mg is not covered by pt insurance plan and alternatives are IBU tab, naproxen tab or etodolac cap. Please advise with strength, directions, quantity and refills.

## 2020-06-16 NOTE — TELEPHONE ENCOUNTER
----- Message from Marlon Iglesias MD sent at 6/15/2020 11:12 AM CDT -----  Ultrasound = enlarged liver due to fatty liver. I wonder if there is also a component of cirrhosis, but the spleen is not enlarged. Regardless, decrease fat in diet and we can repeat CBC later. Does he want to see a GI doctor for a second opinion on his enlarged liver?

## 2020-06-16 NOTE — TELEPHONE ENCOUNTER
Spoke with patient and he stated he did see his results on his mychart. He will watch what he eats and is starting an exercise program. He will wait on the GI at this time. He wanted to let you know that if you wanted him to do an A1c & CBC earlier then Dec. Just let him know.

## 2020-06-18 ENCOUNTER — TELEPHONE (OUTPATIENT)
Dept: FAMILY MEDICINE | Facility: CLINIC | Age: 70
End: 2020-06-18

## 2020-06-18 ENCOUNTER — PATIENT MESSAGE (OUTPATIENT)
Dept: FAMILY MEDICINE | Facility: CLINIC | Age: 70
End: 2020-06-18

## 2020-06-18 NOTE — TELEPHONE ENCOUNTER
----- Message from Jason Rajput sent at 6/18/2020  3:13 PM CDT -----  Type:  Patient Returning Call    Who Called:  Patient  Who Left Message for Patient:  Johanny  Does the patient know what this is regarding?:  Test strips-PA  Best Call Back Number:  [phone]  Additional Information:

## 2020-06-18 NOTE — TELEPHONE ENCOUNTER
----- Message from Jesus Deleon sent at 6/18/2020 12:30 PM CDT -----  Contact: Patient  Pt called and needs a refill for Free Style Glucose Strips    Please send to [facility] in [place]    Pt can be reached at [phone]

## 2020-06-19 ENCOUNTER — PATIENT MESSAGE (OUTPATIENT)
Dept: FAMILY MEDICINE | Facility: CLINIC | Age: 70
End: 2020-06-19

## 2020-06-19 NOTE — TELEPHONE ENCOUNTER
Yes, let's repeat a CBC and A1C in 3 months. Orders placed. Let's also have an office visit after that.

## 2020-06-23 ENCOUNTER — PATIENT MESSAGE (OUTPATIENT)
Dept: FAMILY MEDICINE | Facility: CLINIC | Age: 70
End: 2020-06-23

## 2020-06-23 NOTE — TELEPHONE ENCOUNTER
"Patent is scheduling on status of CMN form that "should" have been sent to pharmacy. It looks like that is the hopd up on getting his supplies?  "

## 2020-06-23 NOTE — TELEPHONE ENCOUNTER
Just spoke with the pharmacy again. There was confusion because the patient had his old prescriber in another state send a prescription for test strips. The pharmacy is calling Medicare to send the CMN to [facility].

## 2020-07-12 ENCOUNTER — HOSPITAL ENCOUNTER (EMERGENCY)
Dept: HOSPITAL 41 - JD.ED | Age: 70
Discharge: LEFT BEFORE DISCHARGE | End: 2020-07-12
Payer: MEDICARE

## 2020-07-12 DIAGNOSIS — I25.10: ICD-10-CM

## 2020-07-12 DIAGNOSIS — R06.00: ICD-10-CM

## 2020-07-12 DIAGNOSIS — I48.0: ICD-10-CM

## 2020-07-12 DIAGNOSIS — M79.661: ICD-10-CM

## 2020-07-12 DIAGNOSIS — M79.662: ICD-10-CM

## 2020-07-12 DIAGNOSIS — R05: ICD-10-CM

## 2020-07-12 DIAGNOSIS — I11.0: ICD-10-CM

## 2020-07-12 DIAGNOSIS — R11.2: ICD-10-CM

## 2020-07-12 DIAGNOSIS — M54.5: ICD-10-CM

## 2020-07-12 DIAGNOSIS — Z11.59: ICD-10-CM

## 2020-07-12 DIAGNOSIS — G89.29: ICD-10-CM

## 2020-07-12 DIAGNOSIS — Z87.891: ICD-10-CM

## 2020-07-12 DIAGNOSIS — I50.9: ICD-10-CM

## 2020-07-12 DIAGNOSIS — R07.9: Primary | ICD-10-CM

## 2020-07-12 PROCEDURE — 85027 COMPLETE CBC AUTOMATED: CPT

## 2020-07-12 PROCEDURE — 93005 ELECTROCARDIOGRAM TRACING: CPT

## 2020-07-12 PROCEDURE — 80053 COMPREHEN METABOLIC PANEL: CPT

## 2020-07-12 PROCEDURE — 83615 LACTATE (LD) (LDH) ENZYME: CPT

## 2020-07-12 PROCEDURE — 87040 BLOOD CULTURE FOR BACTERIA: CPT

## 2020-07-12 PROCEDURE — 85610 PROTHROMBIN TIME: CPT

## 2020-07-12 PROCEDURE — 84484 ASSAY OF TROPONIN QUANT: CPT

## 2020-07-12 PROCEDURE — U0002 COVID-19 LAB TEST NON-CDC: HCPCS

## 2020-07-12 PROCEDURE — 82728 ASSAY OF FERRITIN: CPT

## 2020-07-12 PROCEDURE — 85379 FIBRIN DEGRADATION QUANT: CPT

## 2020-07-12 PROCEDURE — 36600 WITHDRAWAL OF ARTERIAL BLOOD: CPT

## 2020-07-12 PROCEDURE — 99285 EMERGENCY DEPT VISIT HI MDM: CPT

## 2020-07-12 PROCEDURE — 83880 ASSAY OF NATRIURETIC PEPTIDE: CPT

## 2020-07-12 PROCEDURE — 85730 THROMBOPLASTIN TIME PARTIAL: CPT

## 2020-07-12 PROCEDURE — 83735 ASSAY OF MAGNESIUM: CPT

## 2020-07-12 PROCEDURE — 36415 COLL VENOUS BLD VENIPUNCTURE: CPT

## 2020-07-12 PROCEDURE — 82550 ASSAY OF CK (CPK): CPT

## 2020-07-12 PROCEDURE — 85007 BL SMEAR W/DIFF WBC COUNT: CPT

## 2020-07-12 PROCEDURE — 86140 C-REACTIVE PROTEIN: CPT

## 2020-07-12 PROCEDURE — 82803 BLOOD GASES ANY COMBINATION: CPT

## 2020-07-12 NOTE — EDM.PDOC
ED HPI GENERAL MEDICAL PROBLEM





- General


Chief Complaint: Respiratory Problem


Stated Complaint: COUGHING VOMITTING LEG PAIN AND CHEST PAIN


Time Seen by Provider: 07/12/20 19:33


Source of Information: Reports: Patient


History Limitations: Reports: No Limitations





- History of Present Illness


INITIAL COMMENTS - FREE TEXT/NARRATIVE: 





Mr. Turner is a pleasant 70-year-old gentleman with a past medical history 

significant for hypertension, dyslipidemia, coronary artery disease, status post

2 MIs, most recently in 2015, with 2 coronary stents, CHF, and possible COPD, 

all untreated for the past 8 months due to, according to the patient, lack of 

finances, who now presents the ED stating that he has had lower back pain on and

off for the past 2 years, but worse over the past few days, waxing and waning.  

He also states that he developed bilateral lower extremity pain, felt from his 

distal lateral thighs to his proximal lateral legs, since last night.  He states

that he developed sudden-onset left-sided chest pain and a nonproductive cough 

around noon today.  He describes his chest pain as achy and heavy in character. 

He states that he took 3 sublingual nitroglycerin around 17:30's evening, which 

improved his chest pain.  He states that he developed dyspnea around 14:00.  He 

then developed diaphoresis, and nausea with vomiting around 17:30 this evening. 

He states that he had similar symptoms about 1 week ago.





Here in the ED, the patient's initial BP is mildly elevated at 146/100, with a 

tachycardia of 108 bpm.  He is afebrile, saturating 91% on room air.





Other than his chronic lower back pain his more recent symptoms, as above, the 

patient denies recent fever, chills, sore throat, ear pain, nasal or sinus 

congestion, palpitations, constipation, diarrhea, abdominal pain, urinary 

symptoms, recent weight gain or weight loss, recent bloody bowel movements or 

black bowel movements, recent joint aches, headaches, or rashes.








The patient does not have a PCP or Cardiologist.








  ** Generalized


Pain Score (Numeric/FACES): 6





- Related Data


                                    Allergies











Allergy/AdvReac Type Severity Reaction Status Date / Time


 


No Known Allergies Allergy   Verified 07/12/20 19:24














Past Medical History


Cardiovascular History: Reports: Afib (paroxysmal, untreated since Dec 2019), 

CAD, Heart Failure (untreated since Dec 2019), High Cholesterol (untreated since

Dec 2019), Hypertension (untreated since Dec 2019), MI (x 2, last = 2015)


Respiratory History: Reports: COPD (suspected, not tested)


Musculoskeletal History: Reports: Osteoarthritis





- Past Surgical History


HEENT Surgical History: Reports: Oral Surgery (wisdom teeth extraction)


Cardiovascular Surgical History: Reports: Coronary Artery Stent (x 2), Other 

(See Below) (Coronary angiogram x 4)





Social & Family History





- Tobacco Use


Smoking Status *Q: Former Smoker


Tobacco Use Within Last Twelve Months: Smokeless Tobacco (1 pouch/day)


Years of Tobacco use: 51


Packs/Tins Daily: 2


Month/Year Tobacco Last Used: Quit 2016





- Caffeine Use


Caffeine Use: Reports: None





- Alcohol Use


Alcohol Use History: Yes


Alcohol Use Frequency: Socially





- Recreational Drug Use


Recreational Drug Use: No





- Living Situation & Occupation


Living situation: Reports: , Other (Homeless)


Occupation: Unemployed





ED ROS GENERAL





- Review of Systems


Review Of Systems: Comprehensive ROS is negative, except as noted in HPI.


Musculoskeletal: Reports: Back Pain (chronic/recurrent)





ED EXAM, GENERAL





- Physical Exam


Exam: See Below


Exam Limited By: No Limitations


General Appearance: Alert, WD/WN, No Apparent Distress


Eye Exam: Bilateral Eye: EOMI, Normal Inspection


Ears: Normal External Exam, Hearing Grossly Normal


Nose: Normal Inspection


Throat/Mouth: Normal Inspection, Normal Lips, Normal Voice, No Airway Compromise


Head: Atraumatic, Normocephalic


Neck: Normal Inspection, Full Range of Motion


Respiratory/Chest: No Respiratory Distress, Lungs Clear, Normal Breath Sounds, 

No Accessory Muscle Use, Chest Non-Tender (including the left chest)


Cardiovascular: Normal Peripheral Pulses, No Edema, No Gallop, No JVD, No 

Murmur, No Rub, Tachycardia (regular)


Peripheral Pulses: 3+: Radial (L), Radial (R)


GI/Abdominal: Normal Bowel Sounds, Soft, Non-Tender, No Organomegaly, No 

Distention, No Abnormal Bruit, No Mass


 (Male) Exam: Deferred


Rectal (Males) Exam: Deferred


Back Exam: Normal Inspection, Full Range of Motion


Extremities: Normal Inspection, Normal Range of Motion, No Pedal Edema, Normal 

Capillary Refill


Neurological: Alert, Oriented, Normal Cognition, No Motor/Sensory Deficits


Psychiatric: Normal Affect


Skin Exam: Warm, Dry, Intact, Normal Color, No Rash





EKG INTERPRETATION


EKG Date: 07/12/20


Time: 20:03


Rhythm: NSR


Rate (Beats/Min): 91


Axis: Normal


P-Wave: Present


QRS: Normal


ST-T: Normal


QT: Normal


Comparison: NA - No Prior EKG





Course





- Vital Signs


Last Recorded V/S: 


                                Last Vital Signs











Temp  37.9 C   07/12/20 19:31


 


Pulse  108 H  07/12/20 19:31


 


Resp  20   07/12/20 19:31


 


BP  146/100 H  07/12/20 19:31


 


Pulse Ox  91 L  07/12/20 19:31














- Orders/Labs/Meds


Orders: 


                               Active Orders 24 hr











 Category Date Time Status


 


 EKG Documentation Completion [RC] STAT Care  07/12/20 19:53 Active


 


 CULTURE BLOOD [BC] Stat Lab  07/12/20 20:30 Received


 


 Blood Culture x2 Reflex Set [OM.PC] Stat Oth  07/12/20 19:53 Ordered











Labs: 


                                Laboratory Tests











  07/12/20 07/12/20 07/12/20 Range/Units





  19:53 20:09 20:30 


 


WBC     (4.23-9.07)  K/mm3


 


RBC     (4.63-6.08)  M/mm3


 


Hgb     (13.7-17.5)  gm/dl


 


Hct     (40.1-51.0)  %


 


MCV     (79.0-92.2)  fl


 


MCH     (25.7-32.2)  pg


 


MCHC     (32.2-35.5)  g/dl


 


RDW Std Deviation     (35.1-43.9)  fL


 


Plt Count     (163-337)  K/mm3


 


MPV     (9.4-12.3)  fl


 


Neutrophils % (Manual)     (40-60)  %


 


Band Neutrophils %     (0-10)  %


 


Lymphocytes % (Manual)     (20-40)  %


 


Atypical Lymphs %     %


 


Monocytes % (Manual)     (2-10)  %


 


Eosinophils % (Manual)     (0.8-7.0)  %


 


Basophils % (Manual)     (0.2-1.2)  


 


Platelet Estimate     


 


RBC Morph Comment     


 


PT     (9.7-12.0)  SECONDS


 


INR     


 


APTT     (22-31)  SECONDS


 


D-Dimer, Quantitative     (0.19-0.50)  mg/L


 


Puncture Site  Lt radial    


 


ABG pH  7.43    (7.35-7.45)  


 


ABG pCO2  35.4    (35.0-45.0)  mmHg


 


ABG pO2  64.0 L    (80.0-100.0)  mmHg


 


ABG HCO3  23.0    (22.0-26.0)  meq/L


 


ABG O2 Saturation  91.2 L    (96.0-97.0)  %


 


ABG Base Excess  -0.3    (-2-2.0)  


 


Donnie Test  Positive    


 


O2 Delivery Device  Room air    


 


FiO2  21.00    (21..00)  %


 


Sodium     (136-145)  mEq/L


 


Potassium     (3.5-5.1)  mEq/L


 


Chloride     ()  mEq/L


 


Carbon Dioxide     (21-32)  mEq/L


 


Anion Gap     (5-15)  


 


BUN     (7-18)  mg/dL


 


Creatinine     (0.7-1.3)  mg/dL


 


Est Cr Clr Drug Dosing     


 


Estimated GFR (MDRD)     (>60)  mL/min


 


BUN/Creatinine Ratio     (14-18)  


 


Glucose     ()  mg/dL


 


Calcium     (8.5-10.1)  mg/dL


 


Magnesium     (1.8-2.4)  mg/dl


 


Ferritin     ()  ng/ml


 


Total Bilirubin     (0.2-1.0)  mg/dL


 


AST     (15-37)  U/L


 


ALT     (16-63)  U/L


 


Alkaline Phosphatase     ()  U/L


 


Lactate Dehydrogenase     ()  U/L


 


Creatine Kinase     ()  U/L


 


Troponin I     (0.00-0.056)  ng/mL


 


C-Reactive Protein    <0.2  (<1.0)  mg/dL


 


NT-Pro-B Natriuret Pep     (0-125)  pg/mL


 


Total Protein     (6.4-8.2)  g/dl


 


Albumin     (3.4-5.0)  g/dl


 


Globulin     gm/dL


 


Albumin/Globulin Ratio     (1-2)  


 


SARS Virus RNA (PCR)   Negative   (NEGATIVE)  














  07/12/20 07/12/20 07/12/20 Range/Units





  20:30 20:30 20:30 


 


WBC  24.57 H    (4.23-9.07)  K/mm3


 


RBC  4.96    (4.63-6.08)  M/mm3


 


Hgb  15.2    (13.7-17.5)  gm/dl


 


Hct  45.8    (40.1-51.0)  %


 


MCV  92.3 H    (79.0-92.2)  fl


 


MCH  30.6    (25.7-32.2)  pg


 


MCHC  33.2    (32.2-35.5)  g/dl


 


RDW Std Deviation  46.6 H    (35.1-43.9)  fL


 


Plt Count  258    (163-337)  K/mm3


 


MPV  10.4    (9.4-12.3)  fl


 


Neutrophils % (Manual)  25 L    (40-60)  %


 


Band Neutrophils %  0    (0-10)  %


 


Lymphocytes % (Manual)  66 H    (20-40)  %


 


Atypical Lymphs %  4    %


 


Monocytes % (Manual)  3    (2-10)  %


 


Eosinophils % (Manual)  2    (0.8-7.0)  %


 


Basophils % (Manual)  0 L    (0.2-1.2)  


 


Platelet Estimate  Adequate    


 


RBC Morph Comment  Normal    


 


PT   10.7   (9.7-12.0)  SECONDS


 


INR   0.98   


 


APTT   24   (22-31)  SECONDS


 


D-Dimer, Quantitative   0.41   (0.19-0.50)  mg/L


 


Puncture Site     


 


ABG pH     (7.35-7.45)  


 


ABG pCO2     (35.0-45.0)  mmHg


 


ABG pO2     (80.0-100.0)  mmHg


 


ABG HCO3     (22.0-26.0)  meq/L


 


ABG O2 Saturation     (96.0-97.0)  %


 


ABG Base Excess     (-2-2.0)  


 


Donnie Test     


 


O2 Delivery Device     


 


FiO2     (21..00)  %


 


Sodium    142  (136-145)  mEq/L


 


Potassium    3.7  (3.5-5.1)  mEq/L


 


Chloride    106  ()  mEq/L


 


Carbon Dioxide    25  (21-32)  mEq/L


 


Anion Gap    14.7  (5-15)  


 


BUN    13  (7-18)  mg/dL


 


Creatinine    1.1  (0.7-1.3)  mg/dL


 


Est Cr Clr Drug Dosing    TNP  


 


Estimated GFR (MDRD)    > 60  (>60)  mL/min


 


BUN/Creatinine Ratio    11.8 L  (14-18)  


 


Glucose    97  ()  mg/dL


 


Calcium    9.0  (8.5-10.1)  mg/dL


 


Magnesium    2.1  (1.8-2.4)  mg/dl


 


Ferritin     ()  ng/ml


 


Total Bilirubin    0.7  (0.2-1.0)  mg/dL


 


AST    17  (15-37)  U/L


 


ALT    19  (16-63)  U/L


 


Alkaline Phosphatase    74  ()  U/L


 


Lactate Dehydrogenase    196  ()  U/L


 


Creatine Kinase    101  ()  U/L


 


Troponin I    < 0.017  (0.00-0.056)  ng/mL


 


C-Reactive Protein     (<1.0)  mg/dL


 


NT-Pro-B Natriuret Pep     (0-125)  pg/mL


 


Total Protein    7.4  (6.4-8.2)  g/dl


 


Albumin    4.1  (3.4-5.0)  g/dl


 


Globulin    3.3  gm/dL


 


Albumin/Globulin Ratio    1.2  (1-2)  


 


SARS Virus RNA (PCR)     (NEGATIVE)  














  07/12/20 07/12/20 Range/Units





  20:30 20:30 


 


WBC    (4.23-9.07)  K/mm3


 


RBC    (4.63-6.08)  M/mm3


 


Hgb    (13.7-17.5)  gm/dl


 


Hct    (40.1-51.0)  %


 


MCV    (79.0-92.2)  fl


 


MCH    (25.7-32.2)  pg


 


MCHC    (32.2-35.5)  g/dl


 


RDW Std Deviation    (35.1-43.9)  fL


 


Plt Count    (163-337)  K/mm3


 


MPV    (9.4-12.3)  fl


 


Neutrophils % (Manual)    (40-60)  %


 


Band Neutrophils %    (0-10)  %


 


Lymphocytes % (Manual)    (20-40)  %


 


Atypical Lymphs %    %


 


Monocytes % (Manual)    (2-10)  %


 


Eosinophils % (Manual)    (0.8-7.0)  %


 


Basophils % (Manual)    (0.2-1.2)  


 


Platelet Estimate    


 


RBC Morph Comment    


 


PT    (9.7-12.0)  SECONDS


 


INR    


 


APTT    (22-31)  SECONDS


 


D-Dimer, Quantitative    (0.19-0.50)  mg/L


 


Puncture Site    


 


ABG pH    (7.35-7.45)  


 


ABG pCO2    (35.0-45.0)  mmHg


 


ABG pO2    (80.0-100.0)  mmHg


 


ABG HCO3    (22.0-26.0)  meq/L


 


ABG O2 Saturation    (96.0-97.0)  %


 


ABG Base Excess    (-2-2.0)  


 


Donnie Test    


 


O2 Delivery Device    


 


FiO2    (21..00)  %


 


Sodium    (136-145)  mEq/L


 


Potassium    (3.5-5.1)  mEq/L


 


Chloride    ()  mEq/L


 


Carbon Dioxide    (21-32)  mEq/L


 


Anion Gap    (5-15)  


 


BUN    (7-18)  mg/dL


 


Creatinine    (0.7-1.3)  mg/dL


 


Est Cr Clr Drug Dosing    


 


Estimated GFR (MDRD)    (>60)  mL/min


 


BUN/Creatinine Ratio    (14-18)  


 


Glucose    ()  mg/dL


 


Calcium    (8.5-10.1)  mg/dL


 


Magnesium    (1.8-2.4)  mg/dl


 


Ferritin   37  ()  ng/ml


 


Total Bilirubin    (0.2-1.0)  mg/dL


 


AST    (15-37)  U/L


 


ALT    (16-63)  U/L


 


Alkaline Phosphatase    ()  U/L


 


Lactate Dehydrogenase    ()  U/L


 


Creatine Kinase    ()  U/L


 


Troponin I    (0.00-0.056)  ng/mL


 


C-Reactive Protein    (<1.0)  mg/dL


 


NT-Pro-B Natriuret Pep  247 H   (0-125)  pg/mL


 


Total Protein    (6.4-8.2)  g/dl


 


Albumin    (3.4-5.0)  g/dl


 


Globulin    gm/dL


 


Albumin/Globulin Ratio    (1-2)  


 


SARS Virus RNA (PCR)    (NEGATIVE)  














- Re-Assessments/Exams


Free Text/Narrative Re-Assessment/Exam: 





07/12/20 19:57


As above, the patient has had waxing waning lower back pain for about 2 years, 

with recurrence the past few days, then developed bilateral lower extremity pain

 last night, a nonproductive cough and left-sided chest pain around noon today, 

followed by dyspnea around 14:00, then nausea, vomiting, and diaphoresis around 

17:30.  His initial vitals found him to be tachycardic with an oxygen saturation

 of only 91% on room air.  His physical exam is grossly unremarkable.  I have 

ordered an extensive work-up that includes blood work, 2 sets of blood cultures,

 an ABG, a chest x-ray, an ECG, and a test for the SARS-CoV-2 virus.








07/12/20 20:39


Notified by Grisel THOMAS that the patient would like to go AMA.  There does not 

appear to be any precipitating reason why he wants to leave, he just does.





The only test results I have back at this time are his ABG, which shows some 

hypoxemia, but no significant acid-base disturbances, and his ECG which shows a 

normal sinus rhythm and no ischemic changes.








07/12/20 20:46


I dictated discharge instructions, however, it appears that the patient has 

already left the ED.





Departure





- Departure


Time of Disposition: 20:43


Disposition: Against Medical Advice 07


Condition: Good


Clinical Impression: 


 Chest pain, Cough, Dyspnea, Nausea & vomiting, Chronic back pain, Lower 

extremity pain, bilateral








- Discharge Information


*PRESCRIPTION DRUG MONITORING PROGRAM REVIEWED*: Not Applicable


*COPY OF PRESCRIPTION DRUG MONITORING REPORT IN PATIENT MANDA: Not Applicable


Referrals: 


PCP,None [Primary Care Provider] - 


Forms:  ED Department Discharge


Additional Instructions: 


You were seen in the emergency room for a cough with shortness of breath, left-

sided chest pain, nausea and vomiting, lower back pain, and pain to the sides of

both of your lower extremities.





An extensive work-up was ordered, including blood work, sets of blood cultures, 

an arterial blood gas, a chest x-ray, an ECG, and a test for the coronavirus.  

All of these were collected, however, the only test I have back so far is your 

blood gas and your ECG, both of which returned unremarkable.  The remaining 

tests are all still pending.





Without the test results, I cannot say what is the cause of your symptoms, 

however, you have decided to leave AGAINST MEDICAL ADVICE.





If you change your mind and would like to be evaluated, please do not hesitate 

to return to the ER.





Sepsis Event Note (ED)





- Evaluation


Sepsis Screening Result: Possible Sepsis Risk





- Focused Exam


Vital Signs: 


                                   Vital Signs











  Temp Pulse Resp BP Pulse Ox


 


 07/12/20 19:31  37.9 C  108 H  20  146/100 H  91 L














- My Orders


Last 24 Hours: 


My Active Orders





07/12/20 19:53


EKG Documentation Completion [RC] STAT 


Blood Culture x2 Reflex Set [OM.PC] Stat 





07/12/20 20:30


CULTURE BLOOD [BC] Stat 














- Assessment/Plan


Last 24 Hours: 


My Active Orders





07/12/20 19:53


EKG Documentation Completion [RC] STAT 


Blood Culture x2 Reflex Set [OM.PC] Stat 





07/12/20 20:30


CULTURE BLOOD [BC] Stat

## 2020-09-03 DIAGNOSIS — E11.9 TYPE 2 DIABETES MELLITUS WITHOUT COMPLICATION, UNSPECIFIED WHETHER LONG TERM INSULIN USE: ICD-10-CM

## 2020-09-08 ENCOUNTER — OFFICE VISIT (OUTPATIENT)
Dept: PODIATRY | Facility: CLINIC | Age: 70
End: 2020-09-08
Payer: MEDICARE

## 2020-09-08 VITALS — HEIGHT: 69 IN | WEIGHT: 283.75 LBS | BODY MASS INDEX: 42.03 KG/M2

## 2020-09-08 DIAGNOSIS — M20.41 HAMMER TOES OF BOTH FEET: ICD-10-CM

## 2020-09-08 DIAGNOSIS — B35.1 ONYCHOMYCOSIS DUE TO DERMATOPHYTE: ICD-10-CM

## 2020-09-08 DIAGNOSIS — Q82.8 POROKERATOSIS: ICD-10-CM

## 2020-09-08 DIAGNOSIS — M20.42 HAMMER TOES OF BOTH FEET: ICD-10-CM

## 2020-09-08 DIAGNOSIS — E66.01 MORBID OBESITY WITH BMI OF 40.0-44.9, ADULT: ICD-10-CM

## 2020-09-08 DIAGNOSIS — E11.42 DIABETIC POLYNEUROPATHY ASSOCIATED WITH TYPE 2 DIABETES MELLITUS: Primary | ICD-10-CM

## 2020-09-08 PROCEDURE — 11721 DEBRIDE NAIL 6 OR MORE: CPT | Mod: Q9,PBBFAC,PN | Performed by: PODIATRIST

## 2020-09-08 PROCEDURE — 11056 PARNG/CUTG B9 HYPRKR LES 2-4: CPT | Mod: PBBFAC,PN | Performed by: PODIATRIST

## 2020-09-08 PROCEDURE — 99999 PR PBB SHADOW E&M-EST. PATIENT-LVL II: ICD-10-PCS | Mod: PBBFAC,,, | Performed by: PODIATRIST

## 2020-09-08 PROCEDURE — 99499 UNLISTED E&M SERVICE: CPT | Mod: S$PBB,,, | Performed by: PODIATRIST

## 2020-09-08 PROCEDURE — 11721 DEBRIDE NAIL 6 OR MORE: CPT | Mod: Q9,59,S$PBB, | Performed by: PODIATRIST

## 2020-09-08 PROCEDURE — 99499 NO LOS: ICD-10-PCS | Mod: S$PBB,,, | Performed by: PODIATRIST

## 2020-09-08 PROCEDURE — 99999 PR PBB SHADOW E&M-EST. PATIENT-LVL II: CPT | Mod: PBBFAC,,, | Performed by: PODIATRIST

## 2020-09-08 PROCEDURE — 11056 PARNG/CUTG B9 HYPRKR LES 2-4: CPT | Mod: Q9,S$PBB,, | Performed by: PODIATRIST

## 2020-09-08 PROCEDURE — 99212 OFFICE O/P EST SF 10 MIN: CPT | Mod: PBBFAC,PN | Performed by: PODIATRIST

## 2020-09-08 PROCEDURE — 11721 PR DEBRIDEMENT OF NAILS, 6 OR MORE: ICD-10-PCS | Mod: Q9,59,S$PBB, | Performed by: PODIATRIST

## 2020-09-08 PROCEDURE — 11056 PR TRIM BENIGN HYPERKERATOTIC SKIN LESION,2-4: ICD-10-PCS | Mod: Q9,S$PBB,, | Performed by: PODIATRIST

## 2020-09-08 NOTE — PROGRESS NOTES
Subjective:      Patient ID: Tal Maldonado is a 70 y.o. male.    Chief Complaint: Diabetes Mellitus and Diabetic Foot Exam    Tal is a 70 y.o. male who presents to the clinic upon referral from Dr. Iglesias  for evaluation and treatment of diabetic feet. Tal has a past medical history of Arthritis of hand, BPH (benign prostatic hyperplasia), Colon polyps, Diabetes mellitus, Hypertension, WINSOME on CPAP, Other hyperlipidemia, Sensorineural hearing loss (SNHL) of left ear, and Superficial thrombophlebitis. Patient relates no major problem with feet.  Relates having toenails and calluses in need of trimming.  Denies being painful with wearing shoe gear.  Has been applying lotion to the feet as instructed.  Denies any additional pedal complaints.    PCP: Marlon Iglesias MD    Date Last Seen by PCP: 9/20    Hemoglobin A1C   Date Value Ref Range Status   06/04/2020 6.9 (H) 4.0 - 5.6 % Final     Comment:     ADA Screening Guidelines:  5.7-6.4%  Consistent with prediabetes  >or=6.5%  Consistent with diabetes  High levels of fetal hemoglobin interfere with the HbA1C  assay. Heterozygous hemoglobin variants (HbS, HgC, etc)do  not significantly interfere with this assay.   However, presence of multiple variants may affect accuracy.             Past Medical History:   Diagnosis Date    Arthritis of hand     BPH (benign prostatic hyperplasia)     Colon polyps     Diabetes mellitus     Hypertension     WINSOME on CPAP     Other hyperlipidemia     Sensorineural hearing loss (SNHL) of left ear     Superficial thrombophlebitis        Past Surgical History:   Procedure Laterality Date    APPENDECTOMY      DUPUYTREN CONTRACTURE RELEASE Bilateral     HERNIA REPAIR      Umbilical    TONSILLECTOMY         Family History   Problem Relation Age of Onset    Hypertension Mother     Diabetes Mother     Emphysema Father     Prostate cancer Brother        Social History     Socioeconomic History    Marital status:      Spouse  name: Not on file    Number of children: Not on file    Years of education: Not on file    Highest education level: Not on file   Occupational History    Occupation: Wine Distributor    Social Needs    Financial resource strain: Not hard at all    Food insecurity     Worry: Never true     Inability: Never true    Transportation needs     Medical: No     Non-medical: No   Tobacco Use    Smoking status: Never Smoker    Smokeless tobacco: Never Used   Substance and Sexual Activity    Alcohol use: Yes     Alcohol/week: 7.0 standard drinks     Types: 7 Glasses of wine per week     Frequency: 4 or more times a week     Drinks per session: 3 or 4     Binge frequency: Never    Drug use: Never    Sexual activity: Yes     Partners: Female   Lifestyle    Physical activity     Days per week: 4 days     Minutes per session: 40 min    Stress: Not at all   Relationships    Social connections     Talks on phone: More than three times a week     Gets together: More than three times a week     Attends Faith service: Not on file     Active member of club or organization: Yes     Attends meetings of clubs or organizations: More than 4 times per year     Relationship status:    Other Topics Concern    Not on file   Social History Narrative    Not on file       Current Outpatient Medications   Medication Sig Dispense Refill    allopurinol (ZYLOPRIM) 100 MG tablet Take 100 mg by mouth once daily.      amLODIPine (NORVASC) 10 MG tablet Take 10 mg by mouth once daily.      aspirin (ECOTRIN) 81 MG EC tablet Take 1 tablet (81 mg total) by mouth once daily.  0    atorvastatin (LIPITOR) 40 MG tablet TK 1 T PO D      blood sugar diagnostic (FREESTYLE LITE STRIPS) Strp 1 each by Misc.(Non-Drug; Combo Route) route 3 (three) times daily. 200 each 0    clotrimazole-betamethasone 1-0.05% (LOTRISONE) cream Apply topically 2 (two) times daily.      dapagliflozin (FARXIGA) 5 mg Tab tablet Take 5 mg by mouth once daily.  "     finasteride (PROSCAR) 5 mg tablet Take 1 tablet (5 mg total) by mouth once daily. 90 tablet 3    glipiZIDE (GLUCOTROL) 10 MG TR24 Take 5 mg by mouth daily with breakfast.      indomethacin (INDOCIN) 50 MG capsule Take 1 capsule (50 mg total) by mouth 2 (two) times daily as needed (gout flare). 30 capsule 1    LANCETS MISC by Misc.(Non-Drug; Combo Route) route.      levothyroxine (SYNTHROID) 150 MCG tablet Take 150 mcg by mouth before breakfast.      liraglutide 0.6 mg/0.1 mL, 18 mg/3 mL, subq PNIJ (VICTOZA 3-MARIO) 0.6 mg/0.1 mL (18 mg/3 mL) PnIj Inject 0.6 mg into the skin once daily.      lisinopril-hydrochlorothiazide (PRINZIDE,ZESTORETIC) 20-12.5 mg per tablet Take 1 tablet by mouth once daily.      metFORMIN (GLUMETZA) 1000 MG (MOD) 24 hr tablet Take 1,000 mg by mouth daily with breakfast.      metoprolol succinate (TOPROL-XL) 200 MG 24 hr tablet Take 200 mg by mouth once daily.      pen needle, diabetic 32 gauge x 5/32" Ndle by Misc.(Non-Drug; Combo Route) route.      tamsulosin (FLOMAX) 0.4 mg Cap TK ONE C PO D 30 MIN AFTER A MEAL 90 capsule 3     No current facility-administered medications for this visit.        Review of patient's allergies indicates:  No Known Allergies      Review of Systems   Constitution: Negative for chills and fever.   Cardiovascular: Positive for leg swelling. Negative for claudication.   Skin: Positive for color change and nail changes.   Musculoskeletal: Positive for joint swelling. Negative for joint pain, muscle cramps, muscle weakness and myalgias.   Neurological: Negative for numbness and paresthesias.   Psychiatric/Behavioral: Negative for altered mental status.           Objective:      Physical Exam  Constitutional:       General: He is not in acute distress.     Appearance: He is well-developed. He is not diaphoretic.   Cardiovascular:      Pulses:           Dorsalis pedis pulses are 2+ on the right side and 2+ on the left side.        Posterior tibial pulses " are 2+ on the right side and 2+ on the left side.      Comments: CFT is < 3 seconds bilateral.  Pedal hair growth is present bilateral.  Mild varicosities noted bilateral.  Moderate non pitting lower extremity edema noted bilateral.  Toes are warm to touch bilateral.    Musculoskeletal:         General: No tenderness.      Comments: Muscle strength 5/5 in all muscle groups bilateral.  No tenderness nor crepitation with ROM of foot/ankle joints bilateral.  No tenderness with palpation of bilateral foot and ankle.  Bilateral pes planus foot type.  Bilateral semi-rigid contracture of toes 2-5.   Skin:     General: Skin is warm and dry.      Capillary Refill: Capillary refill takes 2 to 3 seconds.      Coloration: Skin is not pale.      Findings: Lesion present. No abrasion, bruising, burn, ecchymosis, erythema, laceration or rash.      Comments: Pedal skin appears edematous bilateral.  Toenails x 10 appear thickened by 2 mm's elongated by 5 mm's, and discolored with subungual debris.  Porokeratoses noted to the Lt. Forefoot (3 lesions) and the Rt. Forefoot (1 lesion).  Light hyperkeratotic build up noted to the Rt. Distal 3rd toe.  Examination of the skin reveals no evidence of significant maceration, rashes, open lesions, suspicious appearing nevi or other concerning lesions.    Neurological:      Mental Status: He is alert and oriented to person, place, and time.      Sensory: Sensory deficit present.      Motor: No weakness or atrophy.      Comments: Protective sensation per [place] monofilament is intact bilateral.  Vibratory sensation is decreased bilateral.  Light touch is intact bilateral.               Assessment:       Encounter Diagnoses   Name Primary?    Diabetic polyneuropathy associated with type 2 diabetes mellitus Yes    Hammer toes of both feet     Onychomycosis due to dermatophyte     Porokeratosis     Morbid obesity with BMI of 40.0-44.9, adult          Plan:       Tal was seen today  for diabetes mellitus and diabetic foot exam.    Diagnoses and all orders for this visit:    Diabetic polyneuropathy associated with type 2 diabetes mellitus    Hammer toes of both feet    Onychomycosis due to dermatophyte    Porokeratosis    Morbid obesity with BMI of 40.0-44.9, adult      I counseled the patient on his conditions, their implications and medical management.    Advised to continue wearing shoe gear that accommodates digital deformities.    Fitted and dispensed a crest pad to minimize pressure to the Rt. 3rd toe.      Discussed the importance of diet and exercise as they pertain to diabetes management and maintaining a healthy BMI.    Shoe inspection. Diabetic Foot Education. Patient reminded of the importance of good nutrition and blood sugar control to help prevent podiatric complications of diabetes. Patient instructed on proper foot hygeine. We discussed wearing proper shoe gear, daily foot inspections, never walking without protective shoe gear, never putting sharp instruments to feet    With patient's permission, nails were aggressively reduced and debrided x 10  to their soft tissue attachment mechanically and with electric , removing all offending nail and debris.  With the patient's verbal consent, a sterile #15 scalpel was used to trim lesions x 4 down to smooth appearing skin without incident.  Patient relates relief following the procedure. He will continue to monitor the areas daily, inspect his feet, wear protective shoe gear when ambulatory, moisturizer to maintain skin integrity and follow in this office in approximately 2-3 months, sooner p.r.n.    Follow up in about 3 months (around 12/8/2020).    Alex Olson DPM

## 2020-09-08 NOTE — LETTER
September 8, 2020      Marlon Iglesias MD  [address] [facility] [address] [place] [address]           [facility]  [address] [place] [address]  Phone: [phone]          Patient: Tal Maldonado   MR Number: [id number]   YOB: 1950   Date of Visit: 9/8/2020       Dear Dr. Marlon Iglesias:    Thank you for referring Tal Maldonado to me for evaluation. Attached you will find relevant portions of my assessment and plan of care.    If you have questions, please do not hesitate to call me. I look forward to following aTl Maldonado along with you.    Sincerely,    Alex Olson, KANIKA    Enclosure  CC:  No Recipients    If you would like to receive this communication electronically, please contact [email or web address] or [phone] to request more information on [employer] Link access.    For providers and/or their staff who would like to refer a patient to [facility], please contact us through our one-stop-shop provider referral line, Toby Medina, at [phone].    If you feel you have received this communication in error or would no longer like to receive these types of communications, please e-mail [email or web address]

## 2020-09-18 ENCOUNTER — LAB VISIT (OUTPATIENT)
Dept: LAB | Facility: HOSPITAL | Age: 70
End: 2020-09-18
Attending: INTERNAL MEDICINE
Payer: MEDICARE

## 2020-09-18 DIAGNOSIS — D69.6 THROMBOCYTOPENIA: ICD-10-CM

## 2020-09-18 DIAGNOSIS — R73.01 IMPAIRED FASTING GLUCOSE: ICD-10-CM

## 2020-09-18 LAB
BASOPHILS # BLD AUTO: 0.04 K/UL (ref 0–0.2)
BASOPHILS NFR BLD: 0.6 % (ref 0–1.9)
DIFFERENTIAL METHOD: ABNORMAL
EOSINOPHIL # BLD AUTO: 0.2 K/UL (ref 0–0.5)
EOSINOPHIL NFR BLD: 2.8 % (ref 0–8)
ERYTHROCYTE [DISTWIDTH] IN BLOOD BY AUTOMATED COUNT: 12.6 % (ref 11.5–14.5)
ESTIMATED AVG GLUCOSE: 148 MG/DL (ref 68–131)
HBA1C MFR BLD HPLC: 6.8 % (ref 4–5.6)
HCT VFR BLD AUTO: 48 % (ref 40–54)
HGB BLD-MCNC: 15.8 G/DL (ref 14–18)
IMM GRANULOCYTES # BLD AUTO: 0.03 K/UL (ref 0–0.04)
IMM GRANULOCYTES NFR BLD AUTO: 0.4 % (ref 0–0.5)
LYMPHOCYTES # BLD AUTO: 1.8 K/UL (ref 1–4.8)
LYMPHOCYTES NFR BLD: 26.7 % (ref 18–48)
MCH RBC QN AUTO: 31.1 PG (ref 27–31)
MCHC RBC AUTO-ENTMCNC: 32.9 G/DL (ref 32–36)
MCV RBC AUTO: 95 FL (ref 82–98)
MONOCYTES # BLD AUTO: 0.6 K/UL (ref 0.3–1)
MONOCYTES NFR BLD: 9.2 % (ref 4–15)
NEUTROPHILS # BLD AUTO: 4.1 K/UL (ref 1.8–7.7)
NEUTROPHILS NFR BLD: 60.3 % (ref 38–73)
NRBC BLD-RTO: 0 /100 WBC
PLATELET # BLD AUTO: 123 K/UL (ref 150–350)
PMV BLD AUTO: 11.3 FL (ref 9.2–12.9)
RBC # BLD AUTO: 5.08 M/UL (ref 4.6–6.2)
WBC # BLD AUTO: 6.73 K/UL (ref 3.9–12.7)

## 2020-09-18 PROCEDURE — 83036 HEMOGLOBIN GLYCOSYLATED A1C: CPT

## 2020-09-18 PROCEDURE — 36415 COLL VENOUS BLD VENIPUNCTURE: CPT | Mod: PO

## 2020-09-18 PROCEDURE — 85025 COMPLETE CBC W/AUTO DIFF WBC: CPT

## 2020-09-23 ENCOUNTER — LAB VISIT (OUTPATIENT)
Dept: LAB | Facility: HOSPITAL | Age: 70
End: 2020-09-23
Attending: INTERNAL MEDICINE
Payer: MEDICARE

## 2020-09-23 ENCOUNTER — OFFICE VISIT (OUTPATIENT)
Dept: FAMILY MEDICINE | Facility: CLINIC | Age: 70
End: 2020-09-23
Payer: MEDICARE

## 2020-09-23 VITALS
OXYGEN SATURATION: 96 % | DIASTOLIC BLOOD PRESSURE: 70 MMHG | SYSTOLIC BLOOD PRESSURE: 138 MMHG | WEIGHT: 275.56 LBS | TEMPERATURE: 98 F | BODY MASS INDEX: 40.81 KG/M2 | HEART RATE: 77 BPM | HEIGHT: 69 IN

## 2020-09-23 DIAGNOSIS — I10 ESSENTIAL HYPERTENSION: ICD-10-CM

## 2020-09-23 DIAGNOSIS — D69.6 THROMBOCYTOPENIA: ICD-10-CM

## 2020-09-23 DIAGNOSIS — M10.49 OTHER SECONDARY ACUTE GOUT OF MULTIPLE SITES: ICD-10-CM

## 2020-09-23 DIAGNOSIS — R94.5 ABNORMAL RESULTS OF LIVER FUNCTION STUDIES: ICD-10-CM

## 2020-09-23 DIAGNOSIS — R74.8 ELEVATED LIVER ENZYMES: ICD-10-CM

## 2020-09-23 DIAGNOSIS — E11.9 TYPE 2 DIABETES MELLITUS WITHOUT COMPLICATION, WITHOUT LONG-TERM CURRENT USE OF INSULIN: Primary | ICD-10-CM

## 2020-09-23 DIAGNOSIS — E78.49 OTHER HYPERLIPIDEMIA: ICD-10-CM

## 2020-09-23 DIAGNOSIS — D12.6 ADENOMATOUS POLYP OF COLON, UNSPECIFIED PART OF COLON: ICD-10-CM

## 2020-09-23 PROCEDURE — 99214 OFFICE O/P EST MOD 30 MIN: CPT | Mod: S$PBB,,, | Performed by: INTERNAL MEDICINE

## 2020-09-23 PROCEDURE — 80074 ACUTE HEPATITIS PANEL: CPT

## 2020-09-23 PROCEDURE — 80061 LIPID PANEL: CPT

## 2020-09-23 PROCEDURE — 99999 PR PBB SHADOW E&M-EST. PATIENT-LVL V: ICD-10-PCS | Mod: PBBFAC,,, | Performed by: INTERNAL MEDICINE

## 2020-09-23 PROCEDURE — 99999 PR PBB SHADOW E&M-EST. PATIENT-LVL V: CPT | Mod: PBBFAC,,, | Performed by: INTERNAL MEDICINE

## 2020-09-23 PROCEDURE — 80053 COMPREHEN METABOLIC PANEL: CPT

## 2020-09-23 PROCEDURE — 99215 OFFICE O/P EST HI 40 MIN: CPT | Mod: PBBFAC,PO | Performed by: INTERNAL MEDICINE

## 2020-09-23 PROCEDURE — 99214 PR OFFICE/OUTPT VISIT, EST, LEVL IV, 30-39 MIN: ICD-10-PCS | Mod: S$PBB,,, | Performed by: INTERNAL MEDICINE

## 2020-09-23 PROCEDURE — 36415 COLL VENOUS BLD VENIPUNCTURE: CPT | Mod: PO

## 2020-09-23 NOTE — PROGRESS NOTES
Patient ID: Tal Maldonado     Chief Complaint:   Chief Complaint   Patient presents with    Follow-up     lab results        HPI: Follow up for diabetes mellitus that is still well Controlled. He's lost weight through diet and exercise and he's feeling very good. Labs show a persistent thrombocytopenia and Right Upper Quadrant = enlarged liver (likely due to fatty liver)- will repeat labs and check Hepatitis panel. WE discussed immunizations and his next Colon.     Review of Systems   Constitutional: Negative.    HENT: Negative.    Eyes: Negative.    Respiratory: Negative.    Cardiovascular: Negative.    Gastrointestinal: Negative.    Endocrine: Negative.    Genitourinary: Negative.    Musculoskeletal: Negative.    Skin: Negative.    Allergic/Immunologic: Negative.    Neurological: Negative.    Hematological: Negative.    Psychiatric/Behavioral: Negative.           Objective:      Physical Exam   Physical Exam  Vitals signs and nursing note reviewed.   Constitutional:       Appearance: Normal appearance. He is well-developed. He is obese.   HENT:      Head: Normocephalic and atraumatic.      Nose: Nose normal.   Eyes:      Extraocular Movements: Extraocular movements intact.      Conjunctiva/sclera: Conjunctivae normal.      Pupils: Pupils are equal, round, and reactive to light.   Neck:      Musculoskeletal: Normal range of motion and neck supple.   Cardiovascular:      Rate and Rhythm: Normal rate and regular rhythm.      Pulses: Normal pulses.      Heart sounds: Normal heart sounds.   Pulmonary:      Effort: Pulmonary effort is normal.      Breath sounds: Normal breath sounds.   Abdominal:      General: Bowel sounds are normal.      Palpations: Abdomen is soft.   Musculoskeletal: Normal range of motion.   Skin:     General: Skin is warm and dry.      Capillary Refill: Capillary refill takes less than 2 seconds.   Neurological:      General: No focal deficit present.      Mental Status: He is alert and oriented  "to person, place, and time.   Psychiatric:         Mood and Affect: Mood normal.         Behavior: Behavior normal.         Thought Content: Thought content normal.         Judgment: Judgment normal.            Vitals:   Vitals:    09/23/20 0758   BP: 138/70   BP Location: Right arm   Patient Position: Sitting   Pulse: 77   Temp: 98.1 °F (36.7 °C)   TempSrc: Oral   SpO2: 96%   Weight: 125 kg (275 lb 9.2 oz)   Height: 5' 9" (1.753 m)          Current Outpatient Medications:     allopurinol (ZYLOPRIM) 100 MG tablet, Take 100 mg by mouth once daily., Disp: , Rfl:     amLODIPine (NORVASC) 10 MG tablet, Take 10 mg by mouth once daily., Disp: , Rfl:     aspirin (ECOTRIN) 81 MG EC tablet, Take 1 tablet (81 mg total) by mouth once daily., Disp: , Rfl: 0    atorvastatin (LIPITOR) 40 MG tablet, TK 1 T PO D, Disp: , Rfl:     blood sugar diagnostic (FREESTYLE LITE STRIPS) Strp, 1 each by Misc.(Non-Drug; Combo Route) route 3 (three) times daily., Disp: 200 each, Rfl: 0    clotrimazole-betamethasone 1-0.05% (LOTRISONE) cream, Apply topically 2 (two) times daily., Disp: , Rfl:     dapagliflozin (FARXIGA) 5 mg Tab tablet, Take 5 mg by mouth once daily., Disp: , Rfl:     finasteride (PROSCAR) 5 mg tablet, Take 1 tablet (5 mg total) by mouth once daily., Disp: 90 tablet, Rfl: 3    glipiZIDE (GLUCOTROL) 10 MG TR24, Take 5 mg by mouth daily with breakfast., Disp: , Rfl:     indomethacin (INDOCIN) 50 MG capsule, Take 1 capsule (50 mg total) by mouth 2 (two) times daily as needed (gout flare)., Disp: 30 capsule, Rfl: 1    LANCETS MISC, by Misc.(Non-Drug; Combo Route) route., Disp: , Rfl:     levothyroxine (SYNTHROID) 150 MCG tablet, Take 150 mcg by mouth before breakfast., Disp: , Rfl:     liraglutide 0.6 mg/0.1 mL, 18 mg/3 mL, subq PNIJ (VICTOZA 3-MARIO) 0.6 mg/0.1 mL (18 mg/3 mL) PnIj, Inject 0.6 mg into the skin once daily., Disp: , Rfl:     lisinopril-hydrochlorothiazide (PRINZIDE,ZESTORETIC) 20-12.5 mg per tablet, Take 1 " "tablet by mouth once daily., Disp: , Rfl:     metFORMIN (GLUMETZA) 1000 MG (MOD) 24 hr tablet, Take 1,000 mg by mouth daily with breakfast., Disp: , Rfl:     metoprolol succinate (TOPROL-XL) 200 MG 24 hr tablet, Take 200 mg by mouth once daily., Disp: , Rfl:     pen needle, diabetic 32 gauge x 5/32" Ndle, by Misc.(Non-Drug; Combo Route) route., Disp: , Rfl:     tamsulosin (FLOMAX) 0.4 mg Cap, TK ONE C PO D 30 MIN AFTER A MEAL, Disp: 90 capsule, Rfl: 3   Assessment:       Patient Active Problem List    Diagnosis Date Noted    Thrombocytopenia 09/23/2020    Elevated liver enzymes 09/23/2020    Abnormal results of liver function studies  09/23/2020    Essential hypertension 09/23/2020    Adenomatous polyp of colon 09/23/2020    Morbid obesity with BMI of 40.0-44.9, adult 06/08/2020    Hammer toes of both feet 06/08/2020    Acute gout of multiple sites 06/04/2020    Morbid obesity due to excess calories 06/04/2020    BPH (benign prostatic hyperplasia)     Diabetes mellitus     Hypertension     Other hyperlipidemia     Sensorineural hearing loss (SNHL) of left ear     WINSOME on CPAP     Colon polyps     Superficial thrombophlebitis     Arthritis of hand           Plan:       Tal Maldonado  was seen today for follow-up and may need lab work.    Diagnoses and all orders for this visit:    Tal was seen today for follow-up.    Diagnoses and all orders for this visit:    Type 2 diabetes mellitus without complication, without long-term current use of insulin  -     Ambulatory referral/consult to Ophthalmology; Future  Controlled     Thrombocytopenia  -     Hepatitis Panel, Acute; Future    Other hyperlipidemia  -     Lipid Panel; Future    Elevated liver enzymes  -     Comprehensive metabolic panel; Future    Abnormal results of liver function studies   -     Hepatitis Panel, Acute; Future    Essential hypertension  Controlled     Adenomatous polyp of colon, unspecified part of colon  Colon next year " (2021)    Other secondary acute gout of multiple sites  Controlled

## 2020-09-24 LAB
ALBUMIN SERPL BCP-MCNC: 4.1 G/DL (ref 3.5–5.2)
ALP SERPL-CCNC: 84 U/L (ref 55–135)
ALT SERPL W/O P-5'-P-CCNC: 45 U/L (ref 10–44)
ANION GAP SERPL CALC-SCNC: 12 MMOL/L (ref 8–16)
AST SERPL-CCNC: 25 U/L (ref 10–40)
BILIRUB SERPL-MCNC: 1 MG/DL (ref 0.1–1)
BUN SERPL-MCNC: 16 MG/DL (ref 8–23)
CALCIUM SERPL-MCNC: 9.5 MG/DL (ref 8.7–10.5)
CHLORIDE SERPL-SCNC: 101 MMOL/L (ref 95–110)
CHOLEST SERPL-MCNC: 142 MG/DL (ref 120–199)
CHOLEST/HDLC SERPL: 4.2 {RATIO} (ref 2–5)
CO2 SERPL-SCNC: 28 MMOL/L (ref 23–29)
CREAT SERPL-MCNC: 1 MG/DL (ref 0.5–1.4)
EST. GFR  (AFRICAN AMERICAN): >60 ML/MIN/1.73 M^2
EST. GFR  (NON AFRICAN AMERICAN): >60 ML/MIN/1.73 M^2
GLUCOSE SERPL-MCNC: 172 MG/DL (ref 70–110)
HAV IGM SERPL QL IA: NEGATIVE
HBV CORE IGM SERPL QL IA: NEGATIVE
HBV SURFACE AG SERPL QL IA: NEGATIVE
HCV AB SERPL QL IA: NEGATIVE
HDLC SERPL-MCNC: 34 MG/DL (ref 40–75)
HDLC SERPL: 23.9 % (ref 20–50)
LDLC SERPL CALC-MCNC: 79 MG/DL (ref 63–159)
NONHDLC SERPL-MCNC: 108 MG/DL
POTASSIUM SERPL-SCNC: 4 MMOL/L (ref 3.5–5.1)
PROT SERPL-MCNC: 6.7 G/DL (ref 6–8.4)
SODIUM SERPL-SCNC: 141 MMOL/L (ref 136–145)
TRIGL SERPL-MCNC: 145 MG/DL (ref 30–150)

## 2020-09-26 DIAGNOSIS — E11.9 TYPE 2 DIABETES MELLITUS WITHOUT COMPLICATION, WITHOUT LONG-TERM CURRENT USE OF INSULIN: Primary | ICD-10-CM

## 2020-09-29 ENCOUNTER — PATIENT MESSAGE (OUTPATIENT)
Dept: FAMILY MEDICINE | Facility: CLINIC | Age: 70
End: 2020-09-29

## 2020-09-29 RX ORDER — PEN NEEDLE, DIABETIC 30 GX3/16"
1 NEEDLE, DISPOSABLE MISCELLANEOUS DAILY
Qty: 100 EACH | Refills: 12 | Status: SHIPPED | OUTPATIENT
Start: 2020-09-29 | End: 2021-10-18

## 2020-09-29 RX ORDER — LIRAGLUTIDE 6 MG/ML
0.6 INJECTION SUBCUTANEOUS DAILY
Qty: 12 ML | Refills: 3 | Status: SHIPPED | OUTPATIENT
Start: 2020-09-29 | End: 2020-09-30 | Stop reason: SDUPTHER

## 2020-09-30 ENCOUNTER — PATIENT MESSAGE (OUTPATIENT)
Dept: FAMILY MEDICINE | Facility: CLINIC | Age: 70
End: 2020-09-30

## 2020-09-30 DIAGNOSIS — E11.9 TYPE 2 DIABETES MELLITUS WITHOUT COMPLICATION, WITHOUT LONG-TERM CURRENT USE OF INSULIN: Primary | ICD-10-CM

## 2020-09-30 RX ORDER — LIRAGLUTIDE 6 MG/ML
1.8 INJECTION SUBCUTANEOUS DAILY
Qty: 28 ML | Refills: 3 | Status: SHIPPED | OUTPATIENT
Start: 2020-09-30 | End: 2021-03-25 | Stop reason: SDUPTHER

## 2020-10-01 ENCOUNTER — PATIENT MESSAGE (OUTPATIENT)
Dept: FAMILY MEDICINE | Facility: CLINIC | Age: 70
End: 2020-10-01

## 2020-10-01 ENCOUNTER — PATIENT MESSAGE (OUTPATIENT)
Dept: OTHER | Facility: OTHER | Age: 70
End: 2020-10-01

## 2020-10-01 NOTE — TELEPHONE ENCOUNTER
I must have entered in the wrong dosage. I'm sorry for the mistake. I sent in a new prescription with the correct dosage.

## 2020-10-05 RX ORDER — ATORVASTATIN CALCIUM 40 MG/1
TABLET, FILM COATED ORAL
Qty: 90 TABLET | Refills: 3 | Status: SHIPPED | OUTPATIENT
Start: 2020-10-05 | End: 2021-09-21

## 2020-10-17 RX ORDER — BLOOD-GLUCOSE METER
1 KIT MISCELLANEOUS 3 TIMES DAILY
Qty: 300 EACH | Refills: 12 | Status: SHIPPED | OUTPATIENT
Start: 2020-10-17 | End: 2021-12-14

## 2020-10-20 RX ORDER — METFORMIN HYDROCHLORIDE 1000 MG/1
1000 TABLET, FILM COATED, EXTENDED RELEASE ORAL
Qty: 180 TABLET | Refills: 3 | Status: SHIPPED | OUTPATIENT
Start: 2020-10-20 | End: 2021-04-21 | Stop reason: SDUPTHER

## 2020-10-29 ENCOUNTER — PATIENT MESSAGE (OUTPATIENT)
Dept: FAMILY MEDICINE | Facility: CLINIC | Age: 70
End: 2020-10-29

## 2020-10-30 ENCOUNTER — LAB VISIT (OUTPATIENT)
Dept: LAB | Facility: HOSPITAL | Age: 70
End: 2020-10-30
Attending: INTERNAL MEDICINE
Payer: MEDICARE

## 2020-10-30 DIAGNOSIS — E11.9 TYPE 2 DIABETES MELLITUS WITHOUT COMPLICATION, WITHOUT LONG-TERM CURRENT USE OF INSULIN: ICD-10-CM

## 2020-10-30 LAB
CHOLEST SERPL-MCNC: 131 MG/DL (ref 120–199)
CHOLEST/HDLC SERPL: 3.9 {RATIO} (ref 2–5)
HDLC SERPL-MCNC: 34 MG/DL (ref 40–75)
HDLC SERPL: 26 % (ref 20–50)
LDLC SERPL CALC-MCNC: 59 MG/DL (ref 63–159)
NONHDLC SERPL-MCNC: 97 MG/DL
TRIGL SERPL-MCNC: 190 MG/DL (ref 30–150)

## 2020-10-30 PROCEDURE — 80061 LIPID PANEL: CPT

## 2020-10-30 PROCEDURE — 36415 COLL VENOUS BLD VENIPUNCTURE: CPT | Mod: PO

## 2020-11-13 ENCOUNTER — PATIENT MESSAGE (OUTPATIENT)
Dept: FAMILY MEDICINE | Facility: CLINIC | Age: 70
End: 2020-11-13

## 2020-11-14 RX ORDER — AMLODIPINE BESYLATE 10 MG/1
10 TABLET ORAL DAILY
Qty: 90 TABLET | Refills: 3 | Status: SHIPPED | OUTPATIENT
Start: 2020-11-14 | End: 2021-05-10 | Stop reason: SDUPTHER

## 2020-11-17 ENCOUNTER — PATIENT MESSAGE (OUTPATIENT)
Dept: FAMILY MEDICINE | Facility: CLINIC | Age: 70
End: 2020-11-17

## 2020-12-06 ENCOUNTER — PATIENT OUTREACH (OUTPATIENT)
Dept: ADMINISTRATIVE | Facility: OTHER | Age: 70
End: 2020-12-06

## 2020-12-07 NOTE — PROGRESS NOTES
Health Maintenance Due   Topic Date Due    Eye Exam  07/03/1960    TETANUS VACCINE  07/03/1968    Colorectal Cancer Screening  09/21/2020     Updates were requested from care everywhere.  Chart was reviewed for overdue Proactive [facility] Encounters (TERRANCE) topics (CRS, Breast Cancer Screening, Eye exam)  Health Maintenance has been updated.  LINKS immunization registry triggered.  Immunizations were reconciled.

## 2020-12-08 ENCOUNTER — OFFICE VISIT (OUTPATIENT)
Dept: PODIATRY | Facility: CLINIC | Age: 70
End: 2020-12-08
Payer: MEDICARE

## 2020-12-08 VITALS — WEIGHT: 275.56 LBS | HEIGHT: 69 IN | BODY MASS INDEX: 40.81 KG/M2

## 2020-12-08 DIAGNOSIS — E11.42 DIABETIC POLYNEUROPATHY ASSOCIATED WITH TYPE 2 DIABETES MELLITUS: Primary | ICD-10-CM

## 2020-12-08 DIAGNOSIS — M20.41 HAMMER TOES OF BOTH FEET: ICD-10-CM

## 2020-12-08 DIAGNOSIS — Q82.8 POROKERATOSIS: ICD-10-CM

## 2020-12-08 DIAGNOSIS — M20.42 HAMMER TOES OF BOTH FEET: ICD-10-CM

## 2020-12-08 DIAGNOSIS — B35.1 ONYCHOMYCOSIS DUE TO DERMATOPHYTE: ICD-10-CM

## 2020-12-08 DIAGNOSIS — E66.01 MORBID OBESITY WITH BMI OF 40.0-44.9, ADULT: ICD-10-CM

## 2020-12-08 PROCEDURE — 11721 DEBRIDE NAIL 6 OR MORE: CPT | Mod: Q9,PBBFAC,PN | Performed by: PODIATRIST

## 2020-12-08 PROCEDURE — 99999 PR PBB SHADOW E&M-EST. PATIENT-LVL II: ICD-10-PCS | Mod: PBBFAC,,, | Performed by: PODIATRIST

## 2020-12-08 PROCEDURE — 99499 UNLISTED E&M SERVICE: CPT | Mod: S$PBB,,, | Performed by: PODIATRIST

## 2020-12-08 PROCEDURE — 11056 PARNG/CUTG B9 HYPRKR LES 2-4: CPT | Mod: PBBFAC,PN | Performed by: PODIATRIST

## 2020-12-08 PROCEDURE — 99499 NO LOS: ICD-10-PCS | Mod: S$PBB,,, | Performed by: PODIATRIST

## 2020-12-08 PROCEDURE — 11721 PR DEBRIDEMENT OF NAILS, 6 OR MORE: ICD-10-PCS | Mod: Q9,59,S$PBB, | Performed by: PODIATRIST

## 2020-12-08 PROCEDURE — 11721 DEBRIDE NAIL 6 OR MORE: CPT | Mod: Q9,59,S$PBB, | Performed by: PODIATRIST

## 2020-12-08 PROCEDURE — 99212 OFFICE O/P EST SF 10 MIN: CPT | Mod: PBBFAC,PN | Performed by: PODIATRIST

## 2020-12-08 PROCEDURE — 11056 PR TRIM BENIGN HYPERKERATOTIC SKIN LESION,2-4: ICD-10-PCS | Mod: Q9,S$PBB,, | Performed by: PODIATRIST

## 2020-12-08 PROCEDURE — 99999 PR PBB SHADOW E&M-EST. PATIENT-LVL II: CPT | Mod: PBBFAC,,, | Performed by: PODIATRIST

## 2020-12-08 PROCEDURE — 11056 PARNG/CUTG B9 HYPRKR LES 2-4: CPT | Mod: Q9,S$PBB,, | Performed by: PODIATRIST

## 2020-12-08 NOTE — PROGRESS NOTES
Subjective:      Patient ID: Tal Maldonado is a 70 y.o. male.    Chief Complaint: Diabetes Mellitus and Diabetic Foot Exam    Tal is a 70 y.o. male who presents to the clinic upon referral from Dr. Iglesias  for evaluation and treatment of diabetic feet. Tal has a past medical history of Arthritis of hand, BPH (benign prostatic hyperplasia), Colon polyps, Diabetes mellitus, Hypertension, WINSOME on CPAP, Other hyperlipidemia, Sensorineural hearing loss (SNHL) of left ear, and Superficial thrombophlebitis. Patient relates no major problem with feet.  Relates having toenails that are in need of trimming.  Denies being painful with wearing shoe gear.  Has not attempted to treat.  Denies use of lotion to address calluses to bilateral plantar foot.  Denies any additional pedal complaints.    PCP: Marlon Iglesias MD    Date Last Seen by PCP: 9/20    Hemoglobin A1C   Date Value Ref Range Status   09/18/2020 6.8 (H) 4.0 - 5.6 % Final     Comment:     ADA Screening Guidelines:  5.7-6.4%  Consistent with prediabetes  >or=6.5%  Consistent with diabetes  High levels of fetal hemoglobin interfere with the HbA1C  assay. Heterozygous hemoglobin variants (HbS, HgC, etc)do  not significantly interfere with this assay.   However, presence of multiple variants may affect accuracy.     06/04/2020 6.9 (H) 4.0 - 5.6 % Final     Comment:     ADA Screening Guidelines:  5.7-6.4%  Consistent with prediabetes  >or=6.5%  Consistent with diabetes  High levels of fetal hemoglobin interfere with the HbA1C  assay. Heterozygous hemoglobin variants (HbS, HgC, etc)do  not significantly interfere with this assay.   However, presence of multiple variants may affect accuracy.             Past Medical History:   Diagnosis Date    Arthritis of hand     BPH (benign prostatic hyperplasia)     Colon polyps     Diabetes mellitus     Hypertension     WINSOME on CPAP     Other hyperlipidemia     Sensorineural hearing loss (SNHL) of left ear     Superficial  thrombophlebitis        Past Surgical History:   Procedure Laterality Date    APPENDECTOMY      DUPUYTREN CONTRACTURE RELEASE Bilateral     HERNIA REPAIR      Umbilical    TONSILLECTOMY         Family History   Problem Relation Age of Onset    Hypertension Mother     Diabetes Mother     Emphysema Father     Prostate cancer Brother        Social History     Socioeconomic History    Marital status:      Spouse name: Not on file    Number of children: Not on file    Years of education: Not on file    Highest education level: Not on file   Occupational History    Occupation: Wine Distributor    Social Needs    Financial resource strain: Not hard at all    Food insecurity     Worry: Never true     Inability: Never true    Transportation needs     Medical: No     Non-medical: No   Tobacco Use    Smoking status: Never Smoker    Smokeless tobacco: Never Used   Substance and Sexual Activity    Alcohol use: Yes     Alcohol/week: 7.0 standard drinks     Types: 7 Glasses of wine per week     Frequency: 4 or more times a week     Drinks per session: 3 or 4     Binge frequency: Never    Drug use: Never    Sexual activity: Yes     Partners: Female   Lifestyle    Physical activity     Days per week: 4 days     Minutes per session: 40 min    Stress: Not at all   Relationships    Social connections     Talks on phone: More than three times a week     Gets together: More than three times a week     Attends Mu-ism service: Not on file     Active member of club or organization: Yes     Attends meetings of clubs or organizations: More than 4 times per year     Relationship status:    Other Topics Concern    Not on file   Social History Narrative    Not on file       Current Outpatient Medications   Medication Sig Dispense Refill    allopurinol (ZYLOPRIM) 100 MG tablet Take 100 mg by mouth once daily.      amLODIPine (NORVASC) 10 MG tablet Take 1 tablet (10 mg total) by mouth once daily. 90  "tablet 3    aspirin (ECOTRIN) 81 MG EC tablet Take 1 tablet (81 mg total) by mouth once daily.  0    atorvastatin (LIPITOR) 40 MG tablet Take 1 PO QHS 90 tablet 3    blood sugar diagnostic (FREESTYLE LITE STRIPS) Strp 1 each by Misc.(Non-Drug; Combo Route) route 3 (three) times daily. 300 each 12    clotrimazole-betamethasone 1-0.05% (LOTRISONE) cream Apply topically 2 (two) times daily.      dapagliflozin (FARXIGA) 5 mg Tab tablet Take 5 mg by mouth once daily.      finasteride (PROSCAR) 5 mg tablet Take 1 tablet (5 mg total) by mouth once daily. 90 tablet 3    glipiZIDE (GLUCOTROL) 10 MG TR24 Take 5 mg by mouth daily with breakfast.      indomethacin (INDOCIN) 50 MG capsule Take 1 capsule (50 mg total) by mouth 2 (two) times daily as needed (gout flare). 30 capsule 1    LANCETS MISC by Misc.(Non-Drug; Combo Route) route.      levothyroxine (SYNTHROID) 150 MCG tablet Take 150 mcg by mouth before breakfast.      liraglutide 0.6 mg/0.1 mL, 18 mg/3 mL, subq PNIJ (VICTOZA 3-MARIO) 0.6 mg/0.1 mL (18 mg/3 mL) PnIj pen Inject 1.8 mg into the skin once daily. 28 mL 3    lisinopril-hydrochlorothiazide (PRINZIDE,ZESTORETIC) 20-12.5 mg per tablet Take 1 tablet by mouth once daily.      metFORMIN (GLUMETZA) 1000 MG (MOD) 24hr tablet Take 1 tablet (1,000 mg total) by mouth daily with breakfast. 180 tablet 3    metoprolol succinate (TOPROL-XL) 200 MG 24 hr tablet Take 200 mg by mouth once daily.      pen needle, diabetic 32 gauge x 5/32" Ndle 1 each by Misc.(Non-Drug; Combo Route) route once daily. 100 each 12    tamsulosin (FLOMAX) 0.4 mg Cap TK ONE C PO D 30 MIN AFTER A MEAL 90 capsule 3     No current facility-administered medications for this visit.        Review of patient's allergies indicates:  No Known Allergies      Review of Systems   Constitution: Negative for chills and fever.   Cardiovascular: Positive for leg swelling. Negative for claudication.   Skin: Positive for color change and nail changes. "   Musculoskeletal: Positive for joint swelling. Negative for joint pain, muscle cramps, muscle weakness and myalgias.   Neurological: Negative for numbness and paresthesias.   Psychiatric/Behavioral: Negative for altered mental status.           Objective:      Physical Exam  Constitutional:       General: He is not in acute distress.     Appearance: He is well-developed. He is not diaphoretic.   Cardiovascular:      Pulses:           Dorsalis pedis pulses are 2+ on the right side and 2+ on the left side.        Posterior tibial pulses are 2+ on the right side and 2+ on the left side.      Comments: CFT is < 3 seconds bilateral.  Pedal hair growth is present bilateral.  Mild varicosities noted bilateral.  Moderate non pitting lower extremity edema noted bilateral.  Toes are warm to touch bilateral.    Musculoskeletal:         General: No tenderness.      Comments: Muscle strength 5/5 in all muscle groups bilateral.  No tenderness nor crepitation with ROM of foot/ankle joints bilateral.  No tenderness with palpation of bilateral foot and ankle.  Bilateral pes planus foot type.  Bilateral semi-rigid contracture of toes 2-5.   Skin:     General: Skin is warm and dry.      Capillary Refill: Capillary refill takes 2 to 3 seconds.      Coloration: Skin is not pale.      Findings: Lesion present. No abrasion, bruising, burn, ecchymosis, erythema, laceration or rash.      Comments: Pedal skin appears edematous bilateral.  Toenails x 10 appear thickened by 2 mm elongated by 4 mm, and discolored with subungual debris.  Porokeratoses noted to the Lt. Forefoot (3 lesions) and the Rt. Forefoot (1 lesion). Examination of the skin reveals no evidence of significant maceration, rashes, open lesions, suspicious appearing nevi or other concerning lesions.    Neurological:      Mental Status: He is alert and oriented to person, place, and time.      Sensory: Sensory deficit present.      Motor: No weakness or atrophy.      Comments:  Protective sensation per [place] monofilament is intact bilateral.  Vibratory sensation is decreased bilateral.  Light touch is intact bilateral.               Assessment:       Encounter Diagnoses   Name Primary?    Diabetic polyneuropathy associated with type 2 diabetes mellitus Yes    Hammer toes of both feet     Onychomycosis due to dermatophyte     Porokeratosis     Morbid obesity with BMI of 40.0-44.9, adult          Plan:       Tal was seen today for diabetes mellitus and diabetic foot exam.    Diagnoses and all orders for this visit:    Diabetic polyneuropathy associated with type 2 diabetes mellitus    Hammer toes of both feet    Onychomycosis due to dermatophyte    Porokeratosis    Morbid obesity with BMI of 40.0-44.9, adult      I counseled the patient on his conditions, their implications and medical management.    Advised to continue wearing shoe gear that accommodates digital deformities.    Discussed the importance of diet and exercise as they pertain to diabetes management and maintaining a healthy BMI.    Shoe inspection. Diabetic Foot Education. Patient reminded of the importance of good nutrition and blood sugar control to help prevent podiatric complications of diabetes. Patient instructed on proper foot hygeine. We discussed wearing proper shoe gear, daily foot inspections, never walking without protective shoe gear, never putting sharp instruments to feet    With patient's permission, nails were aggressively reduced and debrided x 10  to their soft tissue attachment mechanically and with electric , removing all offending nail and debris.  With the patient's verbal consent, a sterile #15 scalpel was used to trim lesions x 4 down to smooth appearing skin without incident.  Patient relates relief following the procedure. He will continue to monitor the areas daily, inspect his feet, wear protective shoe gear when ambulatory, moisturizer to maintain skin integrity and follow  in this office in approximately 2-3 months, sooner p.r.n.    Follow up in about 3 months (around 3/8/2021).    Alex Olson DPM

## 2020-12-08 NOTE — LETTER
December 8, 2020      Marlon Iglesias MD  [address] [facility] [address] [place] [address]           [facility]  [address] [place] [address]  Phone: [phone]          Patient: Tal Maldonado   MR Number: [id number]   YOB: 1950   Date of Visit: 12/8/2020       Dear Dr. Marlon Iglesias:    Thank you for referring Tal Maldonado to me for evaluation. Attached you will find relevant portions of my assessment and plan of care.    If you have questions, please do not hesitate to call me. I look forward to following Tal Maldonado along with you.    Sincerely,    Alex Olson, KANIKA    Enclosure  CC:  No Recipients    If you would like to receive this communication electronically, please contact [email or web address] or [phone] to request more information on [facility] access.    For providers and/or their staff who would like to refer a patient to [facility], please contact us through our one-stop-shop provider referral line, Toby Medina, at [phone].    If you feel you have received this communication in error or would no longer like to receive these types of communications, please e-mail [email or web address]

## 2020-12-11 ENCOUNTER — PATIENT MESSAGE (OUTPATIENT)
Dept: OTHER | Facility: OTHER | Age: 70
End: 2020-12-11

## 2020-12-31 ENCOUNTER — OFFICE VISIT (OUTPATIENT)
Dept: OPTOMETRY | Facility: CLINIC | Age: 70
End: 2020-12-31
Payer: MEDICARE

## 2020-12-31 DIAGNOSIS — Z12.11 SPECIAL SCREENING FOR MALIGNANT NEOPLASMS, COLON: Primary | ICD-10-CM

## 2020-12-31 DIAGNOSIS — E11.9 TYPE 2 DIABETES MELLITUS WITHOUT RETINOPATHY: Primary | ICD-10-CM

## 2020-12-31 DIAGNOSIS — H25.13 NUCLEAR SCLEROSIS OF BOTH EYES: ICD-10-CM

## 2020-12-31 DIAGNOSIS — H52.7 REFRACTIVE ERROR: ICD-10-CM

## 2020-12-31 PROCEDURE — 92004 COMPRE OPH EXAM NEW PT 1/>: CPT | Mod: S$PBB,,, | Performed by: OPTOMETRIST

## 2020-12-31 PROCEDURE — 99213 OFFICE O/P EST LOW 20 MIN: CPT | Mod: PBBFAC,PO | Performed by: OPTOMETRIST

## 2020-12-31 PROCEDURE — 99999 PR PBB SHADOW E&M-EST. PATIENT-LVL III: ICD-10-PCS | Mod: PBBFAC,,, | Performed by: OPTOMETRIST

## 2020-12-31 PROCEDURE — 99999 PR PBB SHADOW E&M-EST. PATIENT-LVL III: CPT | Mod: PBBFAC,,, | Performed by: OPTOMETRIST

## 2020-12-31 PROCEDURE — 92015 DETERMINE REFRACTIVE STATE: CPT | Mod: ,,, | Performed by: OPTOMETRIST

## 2020-12-31 PROCEDURE — 92004 PR EYE EXAM, NEW PATIENT,COMPREHESV: ICD-10-PCS | Mod: S$PBB,,, | Performed by: OPTOMETRIST

## 2020-12-31 PROCEDURE — 92015 PR REFRACTION: ICD-10-PCS | Mod: ,,, | Performed by: OPTOMETRIST

## 2020-12-31 RX ORDER — A/SINGAPORE/GP1908/2015 IVR-180 (AN A/MICHIGAN/45/2015 (H1N1)PDM09-LIKE VIRUS, A/HONG KONG/4801/2014, NYMC X-263B (H3N2) (AN A/HONG KONG/4801/2014-LIKE VIRUS), AND B/BRISBANE/60/2008, WILD TYPE (A B/BRISBANE/60/2008-LIKE VIRUS) 15; 15; 15 UG/.5ML; UG/.5ML; UG/.5ML
INJECTION, SUSPENSION INTRAMUSCULAR
COMMUNITY
Start: 2020-10-12 | End: 2021-03-24 | Stop reason: ALTCHOICE

## 2020-12-31 NOTE — PROGRESS NOTES
HPI     Diabetic Eye Exam      Additional comments: DM ch ;last A1C was 6.8 9/18/2020//              Glaucoma      Additional comments: iop ch//  runs high has been tested 3 times//              Comments     DM ch ;last A1C was Hemoglobin A1C       Date                     Value               Ref Range             Status                09/18/2020               6.8 (H)             4.0 - 5.6 %           Final                     06/04/2020               6.9 (H)             4.0 - 5.6 %           Final                 ----------  No blurred VA  No pain  No flashes   Pos for floaters//  iop ch//  runs high has been tested 3 times//            Last edited by Jenna Amador on 12/31/2020  8:15 AM. (History)            Assessment /Plan     For exam results, see Encounter Report.    Type 2 diabetes mellitus without retinopathy    Nuclear sclerosis of both eyes    Refractive error      1. No diabetic retinopathy, no csme. Return in 1 year for dilated eye exam.  2. Educated pt on presence of cataracts and effects on vision. No surgery at this time. Recheck in one year.  3. New Spec Rx given. Different lens options discussed with patient. RTC 1 year full exam.

## 2021-01-04 ENCOUNTER — PATIENT MESSAGE (OUTPATIENT)
Dept: FAMILY MEDICINE | Facility: CLINIC | Age: 71
End: 2021-01-04

## 2021-01-06 ENCOUNTER — TELEPHONE (OUTPATIENT)
Dept: GASTROENTEROLOGY | Facility: CLINIC | Age: 71
End: 2021-01-06

## 2021-01-06 DIAGNOSIS — Z01.812 PRE-PROCEDURE LAB EXAM: ICD-10-CM

## 2021-01-20 ENCOUNTER — IMMUNIZATION (OUTPATIENT)
Dept: PRIMARY CARE CLINIC | Facility: CLINIC | Age: 71
End: 2021-01-20
Payer: MEDICARE

## 2021-01-20 DIAGNOSIS — Z23 NEED FOR VACCINATION: Primary | ICD-10-CM

## 2021-01-20 PROCEDURE — 91300 COVID-19, MRNA, LNP-S, PF, 30 MCG/0.3 ML DOSE VACCINE: CPT | Mod: S$GLB,,, | Performed by: FAMILY MEDICINE

## 2021-01-20 PROCEDURE — 0001A COVID-19, MRNA, LNP-S, PF, 30 MCG/0.3 ML DOSE VACCINE: CPT | Mod: S$GLB,,, | Performed by: FAMILY MEDICINE

## 2021-01-20 PROCEDURE — 0001A COVID-19, MRNA, LNP-S, PF, 30 MCG/0.3 ML DOSE VACCINE: ICD-10-PCS | Mod: S$GLB,,, | Performed by: FAMILY MEDICINE

## 2021-01-20 PROCEDURE — 91300 COVID-19, MRNA, LNP-S, PF, 30 MCG/0.3 ML DOSE VACCINE: ICD-10-PCS | Mod: S$GLB,,, | Performed by: FAMILY MEDICINE

## 2021-01-27 ENCOUNTER — OFFICE VISIT (OUTPATIENT)
Dept: UROLOGY | Facility: CLINIC | Age: 71
End: 2021-01-27
Payer: MEDICARE

## 2021-01-27 VITALS
HEART RATE: 72 BPM | DIASTOLIC BLOOD PRESSURE: 75 MMHG | BODY MASS INDEX: 23.71 KG/M2 | WEIGHT: 160.06 LBS | HEIGHT: 69 IN | SYSTOLIC BLOOD PRESSURE: 154 MMHG

## 2021-01-27 DIAGNOSIS — Z12.5 SCREENING FOR PROSTATE CANCER: ICD-10-CM

## 2021-01-27 DIAGNOSIS — N40.1 ENLARGED PROSTATE WITH URINARY OBSTRUCTION: ICD-10-CM

## 2021-01-27 DIAGNOSIS — Z80.42 FAMILY HISTORY OF PROSTATE CANCER: Primary | ICD-10-CM

## 2021-01-27 DIAGNOSIS — N13.8 ENLARGED PROSTATE WITH URINARY OBSTRUCTION: ICD-10-CM

## 2021-01-27 PROCEDURE — 99214 PR OFFICE/OUTPT VISIT, EST, LEVL IV, 30-39 MIN: ICD-10-PCS | Mod: S$PBB,,, | Performed by: UROLOGY

## 2021-01-27 PROCEDURE — 99214 OFFICE O/P EST MOD 30 MIN: CPT | Mod: PBBFAC,PO | Performed by: UROLOGY

## 2021-01-27 PROCEDURE — 99999 PR PBB SHADOW E&M-EST. PATIENT-LVL IV: ICD-10-PCS | Mod: PBBFAC,,, | Performed by: UROLOGY

## 2021-01-27 PROCEDURE — 99214 OFFICE O/P EST MOD 30 MIN: CPT | Mod: S$PBB,,, | Performed by: UROLOGY

## 2021-01-27 PROCEDURE — 99999 PR PBB SHADOW E&M-EST. PATIENT-LVL IV: CPT | Mod: PBBFAC,,, | Performed by: UROLOGY

## 2021-01-31 ENCOUNTER — PATIENT MESSAGE (OUTPATIENT)
Dept: FAMILY MEDICINE | Facility: CLINIC | Age: 71
End: 2021-01-31

## 2021-02-01 RX ORDER — LISINOPRIL AND HYDROCHLOROTHIAZIDE 12.5; 2 MG/1; MG/1
1 TABLET ORAL DAILY
Qty: 90 TABLET | Refills: 3 | Status: SHIPPED | OUTPATIENT
Start: 2021-02-01 | End: 2021-03-24 | Stop reason: SDUPTHER

## 2021-02-02 ENCOUNTER — PATIENT MESSAGE (OUTPATIENT)
Dept: FAMILY MEDICINE | Facility: CLINIC | Age: 71
End: 2021-02-02

## 2021-02-10 ENCOUNTER — PATIENT MESSAGE (OUTPATIENT)
Dept: FAMILY MEDICINE | Facility: CLINIC | Age: 71
End: 2021-02-10

## 2021-02-10 RX ORDER — LEVOTHYROXINE SODIUM 150 UG/1
150 TABLET ORAL
Qty: 90 TABLET | Refills: 0 | Status: SHIPPED | OUTPATIENT
Start: 2021-02-10 | End: 2021-05-10 | Stop reason: SDUPTHER

## 2021-02-11 ENCOUNTER — IMMUNIZATION (OUTPATIENT)
Dept: PRIMARY CARE CLINIC | Facility: CLINIC | Age: 71
End: 2021-02-11
Payer: MEDICARE

## 2021-02-11 DIAGNOSIS — Z23 NEED FOR VACCINATION: Primary | ICD-10-CM

## 2021-02-11 PROCEDURE — 91300 COVID-19, MRNA, LNP-S, PF, 30 MCG/0.3 ML DOSE VACCINE: CPT | Mod: S$GLB,,, | Performed by: FAMILY MEDICINE

## 2021-02-11 PROCEDURE — 0002A COVID-19, MRNA, LNP-S, PF, 30 MCG/0.3 ML DOSE VACCINE: CPT | Mod: S$GLB,,, | Performed by: FAMILY MEDICINE

## 2021-02-11 PROCEDURE — 91300 COVID-19, MRNA, LNP-S, PF, 30 MCG/0.3 ML DOSE VACCINE: ICD-10-PCS | Mod: S$GLB,,, | Performed by: FAMILY MEDICINE

## 2021-02-11 PROCEDURE — 0002A COVID-19, MRNA, LNP-S, PF, 30 MCG/0.3 ML DOSE VACCINE: ICD-10-PCS | Mod: S$GLB,,, | Performed by: FAMILY MEDICINE

## 2021-03-04 ENCOUNTER — PATIENT MESSAGE (OUTPATIENT)
Dept: FAMILY MEDICINE | Facility: CLINIC | Age: 71
End: 2021-03-04

## 2021-03-04 RX ORDER — METOPROLOL SUCCINATE 200 MG/1
200 TABLET, EXTENDED RELEASE ORAL DAILY
Qty: 90 TABLET | Refills: 3 | Status: SHIPPED | OUTPATIENT
Start: 2021-03-04 | End: 2022-05-24

## 2021-03-08 ENCOUNTER — OFFICE VISIT (OUTPATIENT)
Dept: PODIATRY | Facility: CLINIC | Age: 71
End: 2021-03-08
Payer: MEDICARE

## 2021-03-08 VITALS — BODY MASS INDEX: 23.71 KG/M2 | WEIGHT: 160.06 LBS | HEIGHT: 69 IN

## 2021-03-08 DIAGNOSIS — M20.41 HAMMER TOES OF BOTH FEET: ICD-10-CM

## 2021-03-08 DIAGNOSIS — M20.42 HAMMER TOES OF BOTH FEET: ICD-10-CM

## 2021-03-08 DIAGNOSIS — B35.1 ONYCHOMYCOSIS DUE TO DERMATOPHYTE: ICD-10-CM

## 2021-03-08 DIAGNOSIS — E11.42 DIABETIC POLYNEUROPATHY ASSOCIATED WITH TYPE 2 DIABETES MELLITUS: Primary | ICD-10-CM

## 2021-03-08 DIAGNOSIS — Q82.8 POROKERATOSIS: ICD-10-CM

## 2021-03-08 PROCEDURE — 99212 OFFICE O/P EST SF 10 MIN: CPT | Mod: PBBFAC,PN,25 | Performed by: PODIATRIST

## 2021-03-08 PROCEDURE — 11056 PARNG/CUTG B9 HYPRKR LES 2-4: CPT | Mod: GY,S$PBB,, | Performed by: PODIATRIST

## 2021-03-08 PROCEDURE — 99999 PR PBB SHADOW E&M-EST. PATIENT-LVL II: CPT | Mod: PBBFAC,,, | Performed by: PODIATRIST

## 2021-03-08 PROCEDURE — 11056 PARNG/CUTG B9 HYPRKR LES 2-4: CPT | Mod: GY,PBBFAC,PN | Performed by: PODIATRIST

## 2021-03-08 PROCEDURE — 99213 PR OFFICE/OUTPT VISIT, EST, LEVL III, 20-29 MIN: ICD-10-PCS | Mod: 25,S$PBB,, | Performed by: PODIATRIST

## 2021-03-08 PROCEDURE — 11721 DEBRIDE NAIL 6 OR MORE: CPT | Mod: GY,59,S$PBB, | Performed by: PODIATRIST

## 2021-03-08 PROCEDURE — 11721 DEBRIDE NAIL 6 OR MORE: CPT | Performed by: PODIATRIST

## 2021-03-08 PROCEDURE — 11056 PR TRIM BENIGN HYPERKERATOTIC SKIN LESION,2-4: ICD-10-PCS | Mod: GY,S$PBB,, | Performed by: PODIATRIST

## 2021-03-08 PROCEDURE — 11721 PR DEBRIDEMENT OF NAILS, 6 OR MORE: ICD-10-PCS | Mod: GY,59,S$PBB, | Performed by: PODIATRIST

## 2021-03-08 PROCEDURE — 99999 PR PBB SHADOW E&M-EST. PATIENT-LVL II: ICD-10-PCS | Mod: PBBFAC,,, | Performed by: PODIATRIST

## 2021-03-08 PROCEDURE — 99213 OFFICE O/P EST LOW 20 MIN: CPT | Mod: 25,S$PBB,, | Performed by: PODIATRIST

## 2021-03-09 ENCOUNTER — LAB VISIT (OUTPATIENT)
Dept: FAMILY MEDICINE | Facility: CLINIC | Age: 71
End: 2021-03-09
Payer: MEDICARE

## 2021-03-09 DIAGNOSIS — Z01.812 PRE-PROCEDURE LAB EXAM: ICD-10-CM

## 2021-03-09 PROCEDURE — U0005 INFEC AGEN DETEC AMPLI PROBE: HCPCS | Performed by: INTERNAL MEDICINE

## 2021-03-09 PROCEDURE — U0003 INFECTIOUS AGENT DETECTION BY NUCLEIC ACID (DNA OR RNA); SEVERE ACUTE RESPIRATORY SYNDROME CORONAVIRUS 2 (SARS-COV-2) (CORONAVIRUS DISEASE [COVID-19]), AMPLIFIED PROBE TECHNIQUE, MAKING USE OF HIGH THROUGHPUT TECHNOLOGIES AS DESCRIBED BY CMS-2020-01-R: HCPCS | Performed by: INTERNAL MEDICINE

## 2021-03-10 ENCOUNTER — PATIENT OUTREACH (OUTPATIENT)
Dept: ADMINISTRATIVE | Facility: HOSPITAL | Age: 71
End: 2021-03-10

## 2021-03-10 LAB — SARS-COV-2 RNA RESP QL NAA+PROBE: NOT DETECTED

## 2021-03-12 ENCOUNTER — ANESTHESIA EVENT (OUTPATIENT)
Dept: ENDOSCOPY | Facility: HOSPITAL | Age: 71
End: 2021-03-12
Payer: MEDICARE

## 2021-03-12 ENCOUNTER — ANESTHESIA (OUTPATIENT)
Dept: ENDOSCOPY | Facility: HOSPITAL | Age: 71
End: 2021-03-12
Payer: MEDICARE

## 2021-03-12 ENCOUNTER — HOSPITAL ENCOUNTER (OUTPATIENT)
Facility: HOSPITAL | Age: 71
Discharge: HOME | End: 2021-03-12
Attending: INTERNAL MEDICINE | Admitting: INTERNAL MEDICINE
Payer: MEDICARE

## 2021-03-12 DIAGNOSIS — Z12.11 COLON CANCER SCREENING: ICD-10-CM

## 2021-03-12 LAB — GLUCOSE SERPL-MCNC: 168 MG/DL (ref 70–110)

## 2021-03-12 PROCEDURE — 88305 TISSUE EXAM BY PATHOLOGIST: ICD-10-PCS | Mod: 26,,, | Performed by: PATHOLOGY

## 2021-03-12 PROCEDURE — 27201089 HC SNARE, DISP (ANY): Mod: PO | Performed by: INTERNAL MEDICINE

## 2021-03-12 PROCEDURE — D9220A PRA ANESTHESIA: ICD-10-PCS | Mod: PT,ANES,, | Performed by: ANESTHESIOLOGY

## 2021-03-12 PROCEDURE — 63600175 PHARM REV CODE 636 W HCPCS: Mod: PO | Performed by: NURSE ANESTHETIST, CERTIFIED REGISTERED

## 2021-03-12 PROCEDURE — 37000009 HC ANESTHESIA EA ADD 15 MINS: Mod: PO | Performed by: INTERNAL MEDICINE

## 2021-03-12 PROCEDURE — 45385 PR COLONOSCOPY,REMV LESN,SNARE: ICD-10-PCS | Mod: PT,,, | Performed by: INTERNAL MEDICINE

## 2021-03-12 PROCEDURE — 88305 TISSUE EXAM BY PATHOLOGIST: CPT | Mod: 26,,, | Performed by: PATHOLOGY

## 2021-03-12 PROCEDURE — 45385 COLONOSCOPY W/LESION REMOVAL: CPT | Mod: PO | Performed by: INTERNAL MEDICINE

## 2021-03-12 PROCEDURE — D9220A PRA ANESTHESIA: Mod: PT,CRNA,, | Performed by: NURSE ANESTHETIST, CERTIFIED REGISTERED

## 2021-03-12 PROCEDURE — 63600175 PHARM REV CODE 636 W HCPCS: Mod: PO | Performed by: INTERNAL MEDICINE

## 2021-03-12 PROCEDURE — D9220A PRA ANESTHESIA: ICD-10-PCS | Mod: PT,CRNA,, | Performed by: NURSE ANESTHETIST, CERTIFIED REGISTERED

## 2021-03-12 PROCEDURE — 45385 COLONOSCOPY W/LESION REMOVAL: CPT | Mod: PT,,, | Performed by: INTERNAL MEDICINE

## 2021-03-12 PROCEDURE — D9220A PRA ANESTHESIA: Mod: PT,ANES,, | Performed by: ANESTHESIOLOGY

## 2021-03-12 PROCEDURE — 88305 TISSUE EXAM BY PATHOLOGIST: CPT | Mod: 59 | Performed by: PATHOLOGY

## 2021-03-12 PROCEDURE — 25000003 PHARM REV CODE 250: Mod: PO | Performed by: NURSE ANESTHETIST, CERTIFIED REGISTERED

## 2021-03-12 PROCEDURE — 37000008 HC ANESTHESIA 1ST 15 MINUTES: Mod: PO | Performed by: INTERNAL MEDICINE

## 2021-03-12 RX ORDER — PROPOFOL 10 MG/ML
VIAL (ML) INTRAVENOUS
Status: DISCONTINUED | OUTPATIENT
Start: 2021-03-12 | End: 2021-03-12

## 2021-03-12 RX ORDER — SODIUM CHLORIDE 0.9 % (FLUSH) 0.9 %
10 SYRINGE (ML) INJECTION
Status: DISCONTINUED | OUTPATIENT
Start: 2021-03-12 | End: 2021-03-12 | Stop reason: HOSPADM

## 2021-03-12 RX ORDER — SODIUM CHLORIDE, SODIUM LACTATE, POTASSIUM CHLORIDE, CALCIUM CHLORIDE 600; 310; 30; 20 MG/100ML; MG/100ML; MG/100ML; MG/100ML
INJECTION, SOLUTION INTRAVENOUS CONTINUOUS
Status: DISCONTINUED | OUTPATIENT
Start: 2021-03-12 | End: 2021-03-12 | Stop reason: HOSPADM

## 2021-03-12 RX ORDER — LIDOCAINE HCL/PF 100 MG/5ML
SYRINGE (ML) INTRAVENOUS
Status: DISCONTINUED | OUTPATIENT
Start: 2021-03-12 | End: 2021-03-12

## 2021-03-12 RX ADMIN — PROPOFOL 25 MG: 10 INJECTION, EMULSION INTRAVENOUS at 09:03

## 2021-03-12 RX ADMIN — PROPOFOL 50 MG: 10 INJECTION, EMULSION INTRAVENOUS at 09:03

## 2021-03-12 RX ADMIN — PROPOFOL 125 MG: 10 INJECTION, EMULSION INTRAVENOUS at 09:03

## 2021-03-12 RX ADMIN — LIDOCAINE HYDROCHLORIDE 100 MG: 20 INJECTION, SOLUTION INTRAVENOUS at 09:03

## 2021-03-12 RX ADMIN — SODIUM CHLORIDE, SODIUM LACTATE, POTASSIUM CHLORIDE, AND CALCIUM CHLORIDE: .6; .31; .03; .02 INJECTION, SOLUTION INTRAVENOUS at 08:03

## 2021-03-15 VITALS
HEIGHT: 69 IN | TEMPERATURE: 98 F | HEART RATE: 75 BPM | SYSTOLIC BLOOD PRESSURE: 140 MMHG | BODY MASS INDEX: 40.73 KG/M2 | RESPIRATION RATE: 16 BRPM | DIASTOLIC BLOOD PRESSURE: 73 MMHG | OXYGEN SATURATION: 97 % | WEIGHT: 275 LBS

## 2021-03-16 LAB
FINAL PATHOLOGIC DIAGNOSIS: NORMAL
GROSS: NORMAL
Lab: NORMAL

## 2021-03-19 ENCOUNTER — LAB VISIT (OUTPATIENT)
Dept: LAB | Facility: HOSPITAL | Age: 71
End: 2021-03-19
Attending: INTERNAL MEDICINE
Payer: MEDICARE

## 2021-03-19 DIAGNOSIS — E78.49 OTHER HYPERLIPIDEMIA: ICD-10-CM

## 2021-03-19 DIAGNOSIS — E11.9 TYPE 2 DIABETES MELLITUS WITHOUT COMPLICATION, WITHOUT LONG-TERM CURRENT USE OF INSULIN: ICD-10-CM

## 2021-03-19 DIAGNOSIS — M10.49 OTHER SECONDARY ACUTE GOUT OF MULTIPLE SITES: ICD-10-CM

## 2021-03-19 DIAGNOSIS — D69.6 THROMBOCYTOPENIA: ICD-10-CM

## 2021-03-19 DIAGNOSIS — R74.8 ELEVATED LIVER ENZYMES: ICD-10-CM

## 2021-03-19 LAB
ALBUMIN SERPL BCP-MCNC: 4 G/DL (ref 3.5–5.2)
ALP SERPL-CCNC: 72 U/L (ref 55–135)
ALT SERPL W/O P-5'-P-CCNC: 38 U/L (ref 10–44)
ANION GAP SERPL CALC-SCNC: 9 MMOL/L (ref 8–16)
AST SERPL-CCNC: 23 U/L (ref 10–40)
BASOPHILS # BLD AUTO: 0.04 K/UL (ref 0–0.2)
BASOPHILS NFR BLD: 0.6 % (ref 0–1.9)
BILIRUB SERPL-MCNC: 0.9 MG/DL (ref 0.1–1)
BUN SERPL-MCNC: 15 MG/DL (ref 8–23)
CALCIUM SERPL-MCNC: 9.3 MG/DL (ref 8.7–10.5)
CHLORIDE SERPL-SCNC: 103 MMOL/L (ref 95–110)
CHOLEST SERPL-MCNC: 145 MG/DL (ref 120–199)
CHOLEST/HDLC SERPL: 4.1 {RATIO} (ref 2–5)
CO2 SERPL-SCNC: 28 MMOL/L (ref 23–29)
CREAT SERPL-MCNC: 1 MG/DL (ref 0.5–1.4)
DIFFERENTIAL METHOD: ABNORMAL
EOSINOPHIL # BLD AUTO: 0.2 K/UL (ref 0–0.5)
EOSINOPHIL NFR BLD: 2.8 % (ref 0–8)
ERYTHROCYTE [DISTWIDTH] IN BLOOD BY AUTOMATED COUNT: 13.1 % (ref 11.5–14.5)
EST. GFR  (AFRICAN AMERICAN): >60 ML/MIN/1.73 M^2
EST. GFR  (NON AFRICAN AMERICAN): >60 ML/MIN/1.73 M^2
ESTIMATED AVG GLUCOSE: 143 MG/DL (ref 68–131)
GLUCOSE SERPL-MCNC: 162 MG/DL (ref 70–110)
HBA1C MFR BLD: 6.6 % (ref 4–5.6)
HCT VFR BLD AUTO: 48.5 % (ref 40–54)
HDLC SERPL-MCNC: 35 MG/DL (ref 40–75)
HDLC SERPL: 24.1 % (ref 20–50)
HGB BLD-MCNC: 16.1 G/DL (ref 14–18)
IMM GRANULOCYTES # BLD AUTO: 0.04 K/UL (ref 0–0.04)
IMM GRANULOCYTES NFR BLD AUTO: 0.6 % (ref 0–0.5)
LDLC SERPL CALC-MCNC: 76.6 MG/DL (ref 63–159)
LYMPHOCYTES # BLD AUTO: 2.2 K/UL (ref 1–4.8)
LYMPHOCYTES NFR BLD: 30.5 % (ref 18–48)
MCH RBC QN AUTO: 30.6 PG (ref 27–31)
MCHC RBC AUTO-ENTMCNC: 33.2 G/DL (ref 32–36)
MCV RBC AUTO: 92 FL (ref 82–98)
MONOCYTES # BLD AUTO: 0.6 K/UL (ref 0.3–1)
MONOCYTES NFR BLD: 8.7 % (ref 4–15)
NEUTROPHILS # BLD AUTO: 4 K/UL (ref 1.8–7.7)
NEUTROPHILS NFR BLD: 56.8 % (ref 38–73)
NONHDLC SERPL-MCNC: 110 MG/DL
NRBC BLD-RTO: 0 /100 WBC
PLATELET # BLD AUTO: 140 K/UL (ref 150–350)
PMV BLD AUTO: 11.1 FL (ref 9.2–12.9)
POTASSIUM SERPL-SCNC: 3.7 MMOL/L (ref 3.5–5.1)
PROT SERPL-MCNC: 6.6 G/DL (ref 6–8.4)
RBC # BLD AUTO: 5.27 M/UL (ref 4.6–6.2)
SODIUM SERPL-SCNC: 140 MMOL/L (ref 136–145)
TRIGL SERPL-MCNC: 167 MG/DL (ref 30–150)
URATE SERPL-MCNC: 6.2 MG/DL (ref 3.4–7)
WBC # BLD AUTO: 7.04 K/UL (ref 3.9–12.7)

## 2021-03-19 PROCEDURE — 83036 HEMOGLOBIN GLYCOSYLATED A1C: CPT | Performed by: INTERNAL MEDICINE

## 2021-03-19 PROCEDURE — 80053 COMPREHEN METABOLIC PANEL: CPT | Performed by: INTERNAL MEDICINE

## 2021-03-19 PROCEDURE — 84550 ASSAY OF BLOOD/URIC ACID: CPT | Performed by: INTERNAL MEDICINE

## 2021-03-19 PROCEDURE — 85025 COMPLETE CBC W/AUTO DIFF WBC: CPT | Performed by: INTERNAL MEDICINE

## 2021-03-19 PROCEDURE — 80061 LIPID PANEL: CPT | Performed by: INTERNAL MEDICINE

## 2021-03-24 ENCOUNTER — OFFICE VISIT (OUTPATIENT)
Dept: FAMILY MEDICINE | Facility: CLINIC | Age: 71
End: 2021-03-24
Payer: MEDICARE

## 2021-03-24 VITALS
BODY MASS INDEX: 40.86 KG/M2 | WEIGHT: 276.69 LBS | SYSTOLIC BLOOD PRESSURE: 138 MMHG | TEMPERATURE: 98 F | DIASTOLIC BLOOD PRESSURE: 76 MMHG | OXYGEN SATURATION: 98 % | HEART RATE: 75 BPM

## 2021-03-24 DIAGNOSIS — I10 ESSENTIAL HYPERTENSION: Primary | ICD-10-CM

## 2021-03-24 DIAGNOSIS — R74.8 ELEVATED LIVER ENZYMES: ICD-10-CM

## 2021-03-24 DIAGNOSIS — E66.01 MORBID OBESITY DUE TO EXCESS CALORIES: ICD-10-CM

## 2021-03-24 DIAGNOSIS — N40.0 BENIGN PROSTATIC HYPERPLASIA WITHOUT LOWER URINARY TRACT SYMPTOMS: ICD-10-CM

## 2021-03-24 DIAGNOSIS — D69.6 THROMBOCYTOPENIA: ICD-10-CM

## 2021-03-24 DIAGNOSIS — E11.9 TYPE 2 DIABETES MELLITUS WITHOUT COMPLICATION, WITHOUT LONG-TERM CURRENT USE OF INSULIN: ICD-10-CM

## 2021-03-24 DIAGNOSIS — E78.49 OTHER HYPERLIPIDEMIA: ICD-10-CM

## 2021-03-24 PROCEDURE — 99999 PR PBB SHADOW E&M-EST. PATIENT-LVL III: ICD-10-PCS | Mod: PBBFAC,,, | Performed by: INTERNAL MEDICINE

## 2021-03-24 PROCEDURE — 99214 OFFICE O/P EST MOD 30 MIN: CPT | Mod: S$PBB,,, | Performed by: INTERNAL MEDICINE

## 2021-03-24 PROCEDURE — 99214 PR OFFICE/OUTPT VISIT, EST, LEVL IV, 30-39 MIN: ICD-10-PCS | Mod: S$PBB,,, | Performed by: INTERNAL MEDICINE

## 2021-03-24 PROCEDURE — 99213 OFFICE O/P EST LOW 20 MIN: CPT | Mod: PBBFAC,PO | Performed by: INTERNAL MEDICINE

## 2021-03-24 PROCEDURE — 99999 PR PBB SHADOW E&M-EST. PATIENT-LVL III: CPT | Mod: PBBFAC,,, | Performed by: INTERNAL MEDICINE

## 2021-03-24 RX ORDER — AMOXICILLIN 500 MG
CAPSULE ORAL DAILY
COMMUNITY

## 2021-03-24 RX ORDER — GLUCOSAMINE/CHONDRO SU A 500-400 MG
1 TABLET ORAL 3 TIMES DAILY
COMMUNITY

## 2021-03-24 RX ORDER — LISINOPRIL AND HYDROCHLOROTHIAZIDE 12.5; 2 MG/1; MG/1
1 TABLET ORAL 2 TIMES DAILY
Qty: 180 TABLET | Refills: 3 | Status: SHIPPED | OUTPATIENT
Start: 2021-03-24 | End: 2022-03-13 | Stop reason: SDUPTHER

## 2021-03-25 ENCOUNTER — PATIENT MESSAGE (OUTPATIENT)
Dept: FAMILY MEDICINE | Facility: CLINIC | Age: 71
End: 2021-03-25

## 2021-03-25 DIAGNOSIS — E11.9 TYPE 2 DIABETES MELLITUS WITHOUT COMPLICATION, WITHOUT LONG-TERM CURRENT USE OF INSULIN: ICD-10-CM

## 2021-03-27 RX ORDER — LIRAGLUTIDE 6 MG/ML
1.8 INJECTION SUBCUTANEOUS DAILY
Qty: 28 ML | Refills: 3 | Status: SHIPPED | OUTPATIENT
Start: 2021-03-27 | End: 2021-10-27

## 2021-04-20 ENCOUNTER — PATIENT MESSAGE (OUTPATIENT)
Dept: FAMILY MEDICINE | Facility: CLINIC | Age: 71
End: 2021-04-20

## 2021-04-21 RX ORDER — METFORMIN HYDROCHLORIDE 1000 MG/1
1000 TABLET, FILM COATED, EXTENDED RELEASE ORAL 2 TIMES DAILY WITH MEALS
Qty: 180 TABLET | Refills: 3 | Status: SHIPPED | OUTPATIENT
Start: 2021-04-21 | End: 2022-04-19

## 2021-04-22 ENCOUNTER — PATIENT MESSAGE (OUTPATIENT)
Dept: FAMILY MEDICINE | Facility: CLINIC | Age: 71
End: 2021-04-22

## 2021-05-10 ENCOUNTER — PATIENT MESSAGE (OUTPATIENT)
Dept: FAMILY MEDICINE | Facility: CLINIC | Age: 71
End: 2021-05-10

## 2021-05-10 DIAGNOSIS — E03.4 HYPOTHYROIDISM DUE TO ACQUIRED ATROPHY OF THYROID: Primary | ICD-10-CM

## 2021-05-11 RX ORDER — LEVOTHYROXINE SODIUM 150 UG/1
150 TABLET ORAL
Qty: 90 TABLET | Refills: 3 | Status: SHIPPED | OUTPATIENT
Start: 2021-05-11 | End: 2022-04-18 | Stop reason: SDUPTHER

## 2021-05-11 RX ORDER — AMLODIPINE BESYLATE 10 MG/1
10 TABLET ORAL DAILY
Qty: 90 TABLET | Refills: 3 | Status: SHIPPED | OUTPATIENT
Start: 2021-05-11 | End: 2021-11-02

## 2021-06-17 ENCOUNTER — PATIENT MESSAGE (OUTPATIENT)
Dept: FAMILY MEDICINE | Facility: CLINIC | Age: 71
End: 2021-06-17

## 2021-06-17 DIAGNOSIS — M10.49 OTHER SECONDARY ACUTE GOUT OF MULTIPLE SITES: Primary | ICD-10-CM

## 2021-06-18 ENCOUNTER — PATIENT MESSAGE (OUTPATIENT)
Dept: FAMILY MEDICINE | Facility: CLINIC | Age: 71
End: 2021-06-18

## 2021-06-18 RX ORDER — ALLOPURINOL 100 MG/1
200 TABLET ORAL DAILY
Qty: 180 TABLET | Refills: 3 | Status: SHIPPED | OUTPATIENT
Start: 2021-06-18 | End: 2022-06-08

## 2021-07-15 ENCOUNTER — PATIENT MESSAGE (OUTPATIENT)
Dept: FAMILY MEDICINE | Facility: CLINIC | Age: 71
End: 2021-07-15

## 2021-07-18 ENCOUNTER — PATIENT OUTREACH (OUTPATIENT)
Dept: ADMINISTRATIVE | Facility: OTHER | Age: 71
End: 2021-07-18

## 2021-07-20 ENCOUNTER — OFFICE VISIT (OUTPATIENT)
Dept: PODIATRY | Facility: CLINIC | Age: 71
End: 2021-07-20
Payer: MEDICARE

## 2021-07-20 VITALS — WEIGHT: 276.69 LBS | HEIGHT: 69 IN | BODY MASS INDEX: 40.98 KG/M2

## 2021-07-20 DIAGNOSIS — E11.42 DIABETIC POLYNEUROPATHY ASSOCIATED WITH TYPE 2 DIABETES MELLITUS: Primary | ICD-10-CM

## 2021-07-20 DIAGNOSIS — M20.42 HAMMER TOES OF BOTH FEET: ICD-10-CM

## 2021-07-20 DIAGNOSIS — M20.41 HAMMER TOES OF BOTH FEET: ICD-10-CM

## 2021-07-20 DIAGNOSIS — E66.01 MORBID OBESITY WITH BMI OF 40.0-44.9, ADULT: ICD-10-CM

## 2021-07-20 DIAGNOSIS — Q82.8 POROKERATOSIS: ICD-10-CM

## 2021-07-20 DIAGNOSIS — B35.1 ONYCHOMYCOSIS DUE TO DERMATOPHYTE: ICD-10-CM

## 2021-07-20 PROCEDURE — 11721 PR DEBRIDEMENT OF NAILS, 6 OR MORE: ICD-10-PCS | Mod: Q9,59,S$PBB, | Performed by: PODIATRIST

## 2021-07-20 PROCEDURE — 99999 PR PBB SHADOW E&M-EST. PATIENT-LVL II: CPT | Mod: PBBFAC,,, | Performed by: PODIATRIST

## 2021-07-20 PROCEDURE — 99999 PR PBB SHADOW E&M-EST. PATIENT-LVL II: ICD-10-PCS | Mod: PBBFAC,,, | Performed by: PODIATRIST

## 2021-07-20 PROCEDURE — 99499 NO LOS: ICD-10-PCS | Mod: S$PBB,,, | Performed by: PODIATRIST

## 2021-07-20 PROCEDURE — 11056 PARNG/CUTG B9 HYPRKR LES 2-4: CPT | Mod: Q9,S$PBB,, | Performed by: PODIATRIST

## 2021-07-20 PROCEDURE — 11056 PARNG/CUTG B9 HYPRKR LES 2-4: CPT | Mod: Q9,PBBFAC,PN | Performed by: PODIATRIST

## 2021-07-20 PROCEDURE — 11721 DEBRIDE NAIL 6 OR MORE: CPT | Mod: Q9,59,S$PBB, | Performed by: PODIATRIST

## 2021-07-20 PROCEDURE — 99212 OFFICE O/P EST SF 10 MIN: CPT | Mod: 25,PBBFAC,PN | Performed by: PODIATRIST

## 2021-07-20 PROCEDURE — 11721 DEBRIDE NAIL 6 OR MORE: CPT | Performed by: PODIATRIST

## 2021-07-20 PROCEDURE — 99499 UNLISTED E&M SERVICE: CPT | Mod: S$PBB,,, | Performed by: PODIATRIST

## 2021-07-20 PROCEDURE — 11056 PR TRIM BENIGN HYPERKERATOTIC SKIN LESION,2-4: ICD-10-PCS | Mod: Q9,S$PBB,, | Performed by: PODIATRIST

## 2021-08-23 ENCOUNTER — PATIENT MESSAGE (OUTPATIENT)
Dept: FAMILY MEDICINE | Facility: CLINIC | Age: 71
End: 2021-08-23

## 2021-08-23 ENCOUNTER — LAB VISIT (OUTPATIENT)
Dept: PRIMARY CARE CLINIC | Facility: OTHER | Age: 71
End: 2021-08-23
Payer: MEDICARE

## 2021-08-23 DIAGNOSIS — R05.8 COUGH WITH EXPOSURE TO COVID-19 VIRUS: ICD-10-CM

## 2021-08-23 DIAGNOSIS — Z20.822 COUGH WITH EXPOSURE TO COVID-19 VIRUS: Primary | ICD-10-CM

## 2021-08-23 DIAGNOSIS — R05.8 COUGH WITH EXPOSURE TO COVID-19 VIRUS: Primary | ICD-10-CM

## 2021-08-23 DIAGNOSIS — Z20.822 COUGH WITH EXPOSURE TO COVID-19 VIRUS: ICD-10-CM

## 2021-08-23 PROCEDURE — U0003 INFECTIOUS AGENT DETECTION BY NUCLEIC ACID (DNA OR RNA); SEVERE ACUTE RESPIRATORY SYNDROME CORONAVIRUS 2 (SARS-COV-2) (CORONAVIRUS DISEASE [COVID-19]), AMPLIFIED PROBE TECHNIQUE, MAKING USE OF HIGH THROUGHPUT TECHNOLOGIES AS DESCRIBED BY CMS-2020-01-R: HCPCS | Performed by: INTERNAL MEDICINE

## 2021-08-25 LAB
SARS-COV-2 RNA RESP QL NAA+PROBE: NOT DETECTED
SARS-COV-2- CYCLE NUMBER: NORMAL

## 2021-09-14 ENCOUNTER — PATIENT MESSAGE (OUTPATIENT)
Dept: FAMILY MEDICINE | Facility: CLINIC | Age: 71
End: 2021-09-14

## 2021-09-14 RX ORDER — GLIPIZIDE 10 MG/1
5 TABLET, FILM COATED, EXTENDED RELEASE ORAL
Status: CANCELLED | OUTPATIENT
Start: 2021-09-14

## 2021-09-15 RX ORDER — GLIPIZIDE 10 MG/1
10 TABLET, FILM COATED, EXTENDED RELEASE ORAL
Qty: 90 TABLET | Refills: 3 | Status: SHIPPED | OUTPATIENT
Start: 2021-09-15 | End: 2021-12-14 | Stop reason: SDUPTHER

## 2021-09-17 ENCOUNTER — LAB VISIT (OUTPATIENT)
Dept: LAB | Facility: HOSPITAL | Age: 71
End: 2021-09-17
Attending: INTERNAL MEDICINE
Payer: MEDICARE

## 2021-09-17 DIAGNOSIS — D69.6 THROMBOCYTOPENIA: ICD-10-CM

## 2021-09-17 DIAGNOSIS — E03.4 HYPOTHYROIDISM DUE TO ACQUIRED ATROPHY OF THYROID: ICD-10-CM

## 2021-09-17 DIAGNOSIS — E11.9 TYPE 2 DIABETES MELLITUS WITHOUT COMPLICATION, WITHOUT LONG-TERM CURRENT USE OF INSULIN: ICD-10-CM

## 2021-09-17 LAB
BASOPHILS # BLD AUTO: 0.04 K/UL (ref 0–0.2)
BASOPHILS NFR BLD: 0.6 % (ref 0–1.9)
CHOLEST SERPL-MCNC: 146 MG/DL (ref 120–199)
CHOLEST/HDLC SERPL: 4.6 {RATIO} (ref 2–5)
DIFFERENTIAL METHOD: ABNORMAL
EOSINOPHIL # BLD AUTO: 0.2 K/UL (ref 0–0.5)
EOSINOPHIL NFR BLD: 2.7 % (ref 0–8)
ERYTHROCYTE [DISTWIDTH] IN BLOOD BY AUTOMATED COUNT: 13.1 % (ref 11.5–14.5)
ESTIMATED AVG GLUCOSE: 151 MG/DL (ref 68–131)
HBA1C MFR BLD: 6.9 % (ref 4–5.6)
HCT VFR BLD AUTO: 50.4 % (ref 40–54)
HDLC SERPL-MCNC: 32 MG/DL (ref 40–75)
HDLC SERPL: 21.9 % (ref 20–50)
HGB BLD-MCNC: 16.6 G/DL (ref 14–18)
IMM GRANULOCYTES # BLD AUTO: 0.04 K/UL (ref 0–0.04)
IMM GRANULOCYTES NFR BLD AUTO: 0.6 % (ref 0–0.5)
LDLC SERPL CALC-MCNC: 82.8 MG/DL (ref 63–159)
LYMPHOCYTES # BLD AUTO: 1.9 K/UL (ref 1–4.8)
LYMPHOCYTES NFR BLD: 26.9 % (ref 18–48)
MCH RBC QN AUTO: 31 PG (ref 27–31)
MCHC RBC AUTO-ENTMCNC: 32.9 G/DL (ref 32–36)
MCV RBC AUTO: 94 FL (ref 82–98)
MONOCYTES # BLD AUTO: 0.6 K/UL (ref 0.3–1)
MONOCYTES NFR BLD: 8.9 % (ref 4–15)
NEUTROPHILS # BLD AUTO: 4.2 K/UL (ref 1.8–7.7)
NEUTROPHILS NFR BLD: 60.3 % (ref 38–73)
NONHDLC SERPL-MCNC: 114 MG/DL
NRBC BLD-RTO: 0 /100 WBC
PLATELET # BLD AUTO: 138 K/UL (ref 150–450)
PMV BLD AUTO: 11.3 FL (ref 9.2–12.9)
RBC # BLD AUTO: 5.35 M/UL (ref 4.6–6.2)
T4 FREE SERPL-MCNC: 0.98 NG/DL (ref 0.71–1.51)
TRIGL SERPL-MCNC: 156 MG/DL (ref 30–150)
TSH SERPL DL<=0.005 MIU/L-ACNC: 1.54 UIU/ML (ref 0.4–4)
WBC # BLD AUTO: 6.99 K/UL (ref 3.9–12.7)

## 2021-09-17 PROCEDURE — 84439 ASSAY OF FREE THYROXINE: CPT | Performed by: INTERNAL MEDICINE

## 2021-09-17 PROCEDURE — 36415 COLL VENOUS BLD VENIPUNCTURE: CPT | Mod: PO | Performed by: INTERNAL MEDICINE

## 2021-09-17 PROCEDURE — 84443 ASSAY THYROID STIM HORMONE: CPT | Performed by: INTERNAL MEDICINE

## 2021-09-17 PROCEDURE — 80061 LIPID PANEL: CPT | Performed by: INTERNAL MEDICINE

## 2021-09-17 PROCEDURE — 85025 COMPLETE CBC W/AUTO DIFF WBC: CPT | Performed by: INTERNAL MEDICINE

## 2021-09-17 PROCEDURE — 83036 HEMOGLOBIN GLYCOSYLATED A1C: CPT | Performed by: INTERNAL MEDICINE

## 2021-09-20 ENCOUNTER — DOCUMENTATION ONLY (OUTPATIENT)
Dept: FAMILY MEDICINE | Facility: CLINIC | Age: 71
End: 2021-09-20

## 2021-09-24 ENCOUNTER — IMMUNIZATION (OUTPATIENT)
Dept: FAMILY MEDICINE | Facility: CLINIC | Age: 71
End: 2021-09-24
Payer: MEDICARE

## 2021-09-24 ENCOUNTER — OFFICE VISIT (OUTPATIENT)
Dept: FAMILY MEDICINE | Facility: CLINIC | Age: 71
End: 2021-09-24
Payer: MEDICARE

## 2021-09-24 VITALS
HEART RATE: 74 BPM | BODY MASS INDEX: 40.57 KG/M2 | OXYGEN SATURATION: 95 % | SYSTOLIC BLOOD PRESSURE: 120 MMHG | DIASTOLIC BLOOD PRESSURE: 66 MMHG | WEIGHT: 274.69 LBS

## 2021-09-24 DIAGNOSIS — I10 ESSENTIAL HYPERTENSION: ICD-10-CM

## 2021-09-24 DIAGNOSIS — Z23 NEED FOR VACCINATION: Primary | ICD-10-CM

## 2021-09-24 DIAGNOSIS — E78.49 OTHER HYPERLIPIDEMIA: ICD-10-CM

## 2021-09-24 DIAGNOSIS — Z00.00 WELLNESS EXAMINATION: Primary | ICD-10-CM

## 2021-09-24 DIAGNOSIS — M10.49 OTHER SECONDARY ACUTE GOUT OF MULTIPLE SITES: ICD-10-CM

## 2021-09-24 DIAGNOSIS — E11.9 TYPE 2 DIABETES MELLITUS WITHOUT COMPLICATION, WITHOUT LONG-TERM CURRENT USE OF INSULIN: ICD-10-CM

## 2021-09-24 DIAGNOSIS — D69.6 THROMBOCYTOPENIA: ICD-10-CM

## 2021-09-24 DIAGNOSIS — E03.4 HYPOTHYROIDISM DUE TO ACQUIRED ATROPHY OF THYROID: ICD-10-CM

## 2021-09-24 PROCEDURE — 99999 PR PBB SHADOW E&M-EST. PATIENT-LVL IV: CPT | Mod: PBBFAC,,, | Performed by: INTERNAL MEDICINE

## 2021-09-24 PROCEDURE — 0031A HC IMMUNIZ ADMIN, SARS-COV-2 COVID-19 VACC, 5X10VP/0.5ML: CPT

## 2021-09-24 PROCEDURE — 99214 OFFICE O/P EST MOD 30 MIN: CPT | Mod: PBBFAC,PO,25 | Performed by: INTERNAL MEDICINE

## 2021-09-24 PROCEDURE — 0003A COVID-19, MRNA, LNP-S, PF, 30 MCG/0.3 ML DOSE VACCINE: CPT | Mod: PBBFAC | Performed by: FAMILY MEDICINE

## 2021-09-24 PROCEDURE — 99214 OFFICE O/P EST MOD 30 MIN: CPT | Mod: S$PBB,,, | Performed by: INTERNAL MEDICINE

## 2021-09-24 PROCEDURE — 99999 PR PBB SHADOW E&M-EST. PATIENT-LVL IV: ICD-10-PCS | Mod: PBBFAC,,, | Performed by: INTERNAL MEDICINE

## 2021-09-24 PROCEDURE — 99214 PR OFFICE/OUTPT VISIT, EST, LEVL IV, 30-39 MIN: ICD-10-PCS | Mod: S$PBB,,, | Performed by: INTERNAL MEDICINE

## 2021-09-24 PROCEDURE — 91300 COVID-19, MRNA, LNP-S, PF, 30 MCG/0.3 ML DOSE VACCINE: CPT | Mod: PBBFAC,PO

## 2021-10-27 ENCOUNTER — PATIENT MESSAGE (OUTPATIENT)
Dept: FAMILY MEDICINE | Facility: CLINIC | Age: 71
End: 2021-10-27
Payer: MEDICARE

## 2021-10-27 DIAGNOSIS — E11.9 TYPE 2 DIABETES MELLITUS WITHOUT COMPLICATION, WITHOUT LONG-TERM CURRENT USE OF INSULIN: ICD-10-CM

## 2021-10-28 RX ORDER — LIRAGLUTIDE 6 MG/ML
1.8 INJECTION SUBCUTANEOUS DAILY
Qty: 28 ML | Refills: 3 | OUTPATIENT
Start: 2021-10-28

## 2021-11-13 ENCOUNTER — PATIENT OUTREACH (OUTPATIENT)
Dept: ADMINISTRATIVE | Facility: OTHER | Age: 71
End: 2021-11-13
Payer: MEDICARE

## 2021-12-09 ENCOUNTER — OFFICE VISIT (OUTPATIENT)
Dept: PODIATRY | Facility: CLINIC | Age: 71
End: 2021-12-09
Payer: MEDICARE

## 2021-12-09 DIAGNOSIS — Q82.8 POROKERATOSIS: ICD-10-CM

## 2021-12-09 DIAGNOSIS — B35.1 ONYCHOMYCOSIS DUE TO DERMATOPHYTE: ICD-10-CM

## 2021-12-09 DIAGNOSIS — M20.41 HAMMER TOES OF BOTH FEET: ICD-10-CM

## 2021-12-09 DIAGNOSIS — M20.42 HAMMER TOES OF BOTH FEET: ICD-10-CM

## 2021-12-09 DIAGNOSIS — E11.42 DIABETIC POLYNEUROPATHY ASSOCIATED WITH TYPE 2 DIABETES MELLITUS: Primary | ICD-10-CM

## 2021-12-09 PROCEDURE — 11721 PR DEBRIDEMENT OF NAILS, 6 OR MORE: ICD-10-PCS | Mod: 59,Q9,S$PBB, | Performed by: PODIATRIST

## 2021-12-09 PROCEDURE — 11721 DEBRIDE NAIL 6 OR MORE: CPT | Mod: 59,Q9,S$PBB, | Performed by: PODIATRIST

## 2021-12-09 PROCEDURE — 11056 PARNG/CUTG B9 HYPRKR LES 2-4: CPT | Mod: Q9,S$PBB,, | Performed by: PODIATRIST

## 2021-12-09 PROCEDURE — 99213 OFFICE O/P EST LOW 20 MIN: CPT | Mod: PBBFAC,PN | Performed by: PODIATRIST

## 2021-12-09 PROCEDURE — 99999 PR PBB SHADOW E&M-EST. PATIENT-LVL III: CPT | Mod: PBBFAC,,, | Performed by: PODIATRIST

## 2021-12-09 PROCEDURE — 11056 PARNG/CUTG B9 HYPRKR LES 2-4: CPT | Mod: Q9,PBBFAC,PN | Performed by: PODIATRIST

## 2021-12-09 PROCEDURE — 11056 PR TRIM BENIGN HYPERKERATOTIC SKIN LESION,2-4: ICD-10-PCS | Mod: Q9,S$PBB,, | Performed by: PODIATRIST

## 2021-12-09 PROCEDURE — 99499 NO LOS: ICD-10-PCS | Mod: S$PBB,,, | Performed by: PODIATRIST

## 2021-12-09 PROCEDURE — 99499 UNLISTED E&M SERVICE: CPT | Mod: S$PBB,,, | Performed by: PODIATRIST

## 2021-12-09 PROCEDURE — 99999 PR PBB SHADOW E&M-EST. PATIENT-LVL III: ICD-10-PCS | Mod: PBBFAC,,, | Performed by: PODIATRIST

## 2021-12-09 PROCEDURE — 11721 DEBRIDE NAIL 6 OR MORE: CPT | Performed by: PODIATRIST

## 2021-12-14 ENCOUNTER — PATIENT MESSAGE (OUTPATIENT)
Dept: FAMILY MEDICINE | Facility: CLINIC | Age: 71
End: 2021-12-14
Payer: MEDICARE

## 2021-12-15 RX ORDER — GLIPIZIDE 10 MG/1
10 TABLET, FILM COATED, EXTENDED RELEASE ORAL
Qty: 90 TABLET | Refills: 3 | Status: SHIPPED | OUTPATIENT
Start: 2021-12-15 | End: 2022-12-07 | Stop reason: SDUPTHER

## 2021-12-17 RX ORDER — BLOOD-GLUCOSE METER
KIT MISCELLANEOUS
Qty: 300 STRIP | OUTPATIENT
Start: 2021-12-17

## 2021-12-21 ENCOUNTER — DOCUMENTATION ONLY (OUTPATIENT)
Dept: FAMILY MEDICINE | Facility: CLINIC | Age: 71
End: 2021-12-21
Payer: MEDICARE

## 2021-12-21 ENCOUNTER — PATIENT MESSAGE (OUTPATIENT)
Dept: FAMILY MEDICINE | Facility: CLINIC | Age: 71
End: 2021-12-21
Payer: MEDICARE

## 2021-12-22 ENCOUNTER — PATIENT MESSAGE (OUTPATIENT)
Dept: FAMILY MEDICINE | Facility: CLINIC | Age: 71
End: 2021-12-22
Payer: MEDICARE

## 2022-01-03 ENCOUNTER — PATIENT OUTREACH (OUTPATIENT)
Dept: ADMINISTRATIVE | Facility: OTHER | Age: 72
End: 2022-01-03
Payer: MEDICARE

## 2022-01-03 NOTE — PROGRESS NOTES
LINKS immunization registry updated  Care Everywhere updated  Health Maintenance updated  Chart reviewed for overdue Proactive [facility] Encounters (TERRANCE) health maintenance testing (CRS, Breast Ca, Diabetic Eye Exam)   Orders entered:N/A

## 2022-01-04 ENCOUNTER — PATIENT MESSAGE (OUTPATIENT)
Dept: FAMILY MEDICINE | Facility: CLINIC | Age: 72
End: 2022-01-04
Payer: MEDICARE

## 2022-01-06 ENCOUNTER — OFFICE VISIT (OUTPATIENT)
Dept: OPTOMETRY | Facility: CLINIC | Age: 72
End: 2022-01-06
Payer: MEDICARE

## 2022-01-06 DIAGNOSIS — H25.13 NUCLEAR SCLEROSIS OF BOTH EYES: ICD-10-CM

## 2022-01-06 DIAGNOSIS — H52.7 REFRACTIVE ERROR: ICD-10-CM

## 2022-01-06 DIAGNOSIS — E11.9 TYPE 2 DIABETES MELLITUS WITHOUT RETINOPATHY: Primary | ICD-10-CM

## 2022-01-06 PROCEDURE — 92014 PR EYE EXAM, EST PATIENT,COMPREHESV: ICD-10-PCS | Mod: S$PBB,,, | Performed by: OPTOMETRIST

## 2022-01-06 PROCEDURE — 99999 PR PBB SHADOW E&M-EST. PATIENT-LVL III: ICD-10-PCS | Mod: PBBFAC,,, | Performed by: OPTOMETRIST

## 2022-01-06 PROCEDURE — 99999 PR PBB SHADOW E&M-EST. PATIENT-LVL III: CPT | Mod: PBBFAC,,, | Performed by: OPTOMETRIST

## 2022-01-06 PROCEDURE — 92014 COMPRE OPH EXAM EST PT 1/>: CPT | Mod: S$PBB,,, | Performed by: OPTOMETRIST

## 2022-01-06 PROCEDURE — 99213 OFFICE O/P EST LOW 20 MIN: CPT | Mod: PBBFAC,PO | Performed by: OPTOMETRIST

## 2022-01-06 NOTE — PROGRESS NOTES
HPI     Pt is here for annual DM eye exam DLS- 12/31/2020    Pt states his DM and HTN are both stable on meds, denies changes in va. No   new floaters, no fol. Pt is not using gtts.     Hemoglobin A1C       Date                     Value               Ref Range             Status                09/17/2021               6.9 (H)             4.0 - 5.6 %           Final              Comment:    ADA Screening Guidelines:  5.7-6.4%  Consistent with   prediabetes  >or=6.5%  Consistent with diabetes    High levels of fetal   hemoglobin interfere with the HbA1C  assay. Heterozygous hemoglobin   variants (HbS, HgC, etc)do  not significantly interfere with this assay.     However, presence of multiple variants may affect accuracy.         03/19/2021               6.6 (H)             4.0 - 5.6 %           Final              Comment:    ADA Screening Guidelines:  5.7-6.4%  Consistent with   prediabetes  >or=6.5%  Consistent with diabetes    High levels of fetal   hemoglobin interfere with the HbA1C  assay. Heterozygous hemoglobin   variants (HbS, HgC, etc)do  not significantly interfere with this assay.     However, presence of multiple variants may affect accuracy.         09/18/2020               6.8 (H)             4.0 - 5.6 %           Final              Comment:    ADA Screening Guidelines:  5.7-6.4%  Consistent with   prediabetes  >or=6.5%  Consistent with diabetes  High levels of fetal   hemoglobin interfere with the HbA1C  assay. Heterozygous hemoglobin   variants (HbS, HgC, etc)do  not significantly interfere with this assay.     However, presence of multiple variants may affect accuracy.    ----------     Last edited by Shayy Velasco MA on 1/6/2022  1:10 PM. (History)            Assessment /Plan     For exam results, see Encounter Report.    Type 2 diabetes mellitus without retinopathy    Nuclear sclerosis of both eyes    Refractive error      1. No diabetic retinopathy, no csme. Return in 1 year for dilated eye  exam.  2. Educated pt on presence of cataracts and effects on vision. No surgery at this time. Recheck in one year.  3. Spectacle Rx given, discussed different options for glasses. RTC 1 year routine eye exam.

## 2022-01-07 NOTE — TELEPHONE ENCOUNTER
No new care gaps identified.  Powered by [employer]. Reference number: [id number].   1/07/2022 10:48:32 AM CST

## 2022-01-11 ENCOUNTER — TELEPHONE (OUTPATIENT)
Dept: FAMILY MEDICINE | Facility: CLINIC | Age: 72
End: 2022-01-11
Payer: MEDICARE

## 2022-01-11 NOTE — TELEPHONE ENCOUNTER
----- Message from Patricia Negron sent at 1/11/2022 12:10 PM CST -----  Type:  Pharmacy Calling to Clarify an RX    Name of Caller:  Hannah  Pharmacy Name:  [facility]  Prescription Name:  test strips  What do they need to clarify?:  Medicare is not accepting the form, she wants to discuss how to correct it  Best Call Back Number:  [phone]  Additional Information:  thank you

## 2022-01-13 ENCOUNTER — PATIENT MESSAGE (OUTPATIENT)
Dept: FAMILY MEDICINE | Facility: CLINIC | Age: 72
End: 2022-01-13
Payer: MEDICARE

## 2022-01-24 ENCOUNTER — PATIENT MESSAGE (OUTPATIENT)
Dept: FAMILY MEDICINE | Facility: CLINIC | Age: 72
End: 2022-01-24
Payer: MEDICARE

## 2022-01-24 DIAGNOSIS — H91.90 HEARING LOSS, UNSPECIFIED HEARING LOSS TYPE, UNSPECIFIED LATERALITY: Primary | ICD-10-CM

## 2022-01-26 ENCOUNTER — PATIENT MESSAGE (OUTPATIENT)
Dept: FAMILY MEDICINE | Facility: CLINIC | Age: 72
End: 2022-01-26
Payer: MEDICARE

## 2022-01-27 RX ORDER — BLOOD-GLUCOSE METER
KIT MISCELLANEOUS DAILY
COMMUNITY
Start: 2022-01-18 | End: 2023-01-13

## 2022-01-28 ENCOUNTER — OFFICE VISIT (OUTPATIENT)
Dept: UROLOGY | Facility: CLINIC | Age: 72
End: 2022-01-28
Payer: MEDICARE

## 2022-01-28 VITALS — WEIGHT: 274.69 LBS | BODY MASS INDEX: 40.69 KG/M2 | HEIGHT: 69 IN

## 2022-01-28 DIAGNOSIS — Z12.5 SCREENING FOR PROSTATE CANCER: ICD-10-CM

## 2022-01-28 DIAGNOSIS — N13.8 ENLARGED PROSTATE WITH URINARY OBSTRUCTION: Primary | ICD-10-CM

## 2022-01-28 DIAGNOSIS — Z80.42 FAMILY HISTORY OF PROSTATE CANCER: ICD-10-CM

## 2022-01-28 DIAGNOSIS — N40.1 ENLARGED PROSTATE WITH URINARY OBSTRUCTION: Primary | ICD-10-CM

## 2022-01-28 PROCEDURE — 99999 PR PBB SHADOW E&M-EST. PATIENT-LVL III: CPT | Mod: PBBFAC,,, | Performed by: UROLOGY

## 2022-01-28 PROCEDURE — 99214 OFFICE O/P EST MOD 30 MIN: CPT | Mod: S$PBB,,, | Performed by: UROLOGY

## 2022-01-28 PROCEDURE — 99213 OFFICE O/P EST LOW 20 MIN: CPT | Mod: PBBFAC,PO | Performed by: UROLOGY

## 2022-01-28 PROCEDURE — 99999 PR PBB SHADOW E&M-EST. PATIENT-LVL III: ICD-10-PCS | Mod: PBBFAC,,, | Performed by: UROLOGY

## 2022-01-28 PROCEDURE — 99214 PR OFFICE/OUTPT VISIT, EST, LEVL IV, 30-39 MIN: ICD-10-PCS | Mod: S$PBB,,, | Performed by: UROLOGY

## 2022-01-28 NOTE — PROGRESS NOTES
Subjective:       Patient ID: Tal Maldonado is a 71 y.o. male.    Chief Complaint: Annual Exam    HPI     70-year-old with BPH.  He has been maintained on Flomax and Finasteride for greater than 10 years he estimates.  He does have a family history of prostate cancer.  His brother had a radical prostatectomy in the past.  His last PSA was 066.  When he takes his Flomax and finasteride he has no bothersome urinary symptoms.  He denies hematuria and dysuria.  He has no previous urinary tract infections.  He is overall satisfied with his urinary symptoms.       Component PSA, Screen   Latest Ref Rng & Units 0.00 - 4.00 ng/mL   3/19/2021 0.66   1/27/2020 0.52       Review of Systems   Constitutional: Negative for fever.   Genitourinary: Negative for dysuria and hematuria.       Objective:      Physical Exam  Vitals reviewed.   Constitutional:       Appearance: He is well-developed and well-nourished.   HENT:      Head: Normocephalic and atraumatic.   Eyes:      Conjunctiva/sclera: Conjunctivae normal.   Cardiovascular:      Rate and Rhythm: Normal rate.   Pulmonary:      Effort: Pulmonary effort is normal.   Genitourinary:     Prostate: Enlarged (50g, s/s/a). Not tender.      Rectum: No mass. Normal anal tone.   Musculoskeletal:         General: Normal range of motion.   Skin:     General: Skin is warm and dry.      Findings: No rash.   Neurological:      Mental Status: He is alert and oriented to person, place, and time.   Psychiatric:         Mood and Affect: Mood and affect normal.         Assessment:       1. Enlarged prostate with urinary obstruction    2. Screening for prostate cancer    3. Family history of prostate cancer        Plan:       Enlarged prostate with urinary obstruction  -     Prostate Specific Antigen, Diagnostic; Future; Expected date: 01/28/2022    Screening for prostate cancer    Family history of prostate cancer      Update PSA.  Annual follow-up.

## 2022-03-12 DIAGNOSIS — I10 ESSENTIAL HYPERTENSION: ICD-10-CM

## 2022-03-12 NOTE — TELEPHONE ENCOUNTER
Care Due:                  Date            Visit Type   Department     Provider  --------------------------------------------------------------------------------                                EP -                              [facility] FAMILY  Last Visit: 09-      CARE ([facility])   WAYNE Iglesias                               -                              [facility]  Next Visit: 03-      [facility] ([facility])   WAYNE Iglesias                                                            Last  Test          Frequency    Reason                     Performed    Due Date  --------------------------------------------------------------------------------    CMP.........  12 months..  FARXIGA, allopurinoL,      03- 03-                             atorvastatin, glipiZIDE,                             lisinopriL-hydrochlorothi                             azide, metFORMIN.........    HBA1C.......  6 months...  [place]OR[place]IA RIOS,          09- 03-                             glipiZIDE, metFORMIN.....    Uric Acid...  12 months..  allopurinoL..............  03- 03-    Powered by [employer]. Reference number: [id number].   3/12/2022 10:23:34 AM CST

## 2022-03-13 ENCOUNTER — PATIENT MESSAGE (OUTPATIENT)
Dept: FAMILY MEDICINE | Facility: CLINIC | Age: 72
End: 2022-03-13
Payer: MEDICARE

## 2022-03-13 DIAGNOSIS — I10 ESSENTIAL HYPERTENSION: ICD-10-CM

## 2022-03-13 NOTE — TELEPHONE ENCOUNTER
No new care gaps identified.  Powered by [employer]. Reference number: [id number].   3/13/2022 12:52:46 PM CDT

## 2022-03-14 RX ORDER — LISINOPRIL AND HYDROCHLOROTHIAZIDE 12.5; 2 MG/1; MG/1
1 TABLET ORAL 2 TIMES DAILY
Qty: 180 TABLET | Refills: 3 | Status: SHIPPED | OUTPATIENT
Start: 2022-03-14 | End: 2023-03-02

## 2022-03-17 ENCOUNTER — LAB VISIT (OUTPATIENT)
Dept: LAB | Facility: HOSPITAL | Age: 72
End: 2022-03-17
Attending: INTERNAL MEDICINE
Payer: MEDICARE

## 2022-03-17 DIAGNOSIS — M10.49 OTHER SECONDARY ACUTE GOUT OF MULTIPLE SITES: ICD-10-CM

## 2022-03-17 DIAGNOSIS — E03.4 HYPOTHYROIDISM DUE TO ACQUIRED ATROPHY OF THYROID: ICD-10-CM

## 2022-03-17 DIAGNOSIS — D69.6 THROMBOCYTOPENIA: ICD-10-CM

## 2022-03-17 DIAGNOSIS — E11.9 TYPE 2 DIABETES MELLITUS WITHOUT COMPLICATION, WITHOUT LONG-TERM CURRENT USE OF INSULIN: ICD-10-CM

## 2022-03-17 LAB
BASOPHILS # BLD AUTO: 0.04 K/UL (ref 0–0.2)
BASOPHILS NFR BLD: 0.6 % (ref 0–1.9)
CHOLEST SERPL-MCNC: 155 MG/DL (ref 120–199)
CHOLEST/HDLC SERPL: 4.4 {RATIO} (ref 2–5)
DIFFERENTIAL METHOD: ABNORMAL
EOSINOPHIL # BLD AUTO: 0.2 K/UL (ref 0–0.5)
EOSINOPHIL NFR BLD: 2.3 % (ref 0–8)
ERYTHROCYTE [DISTWIDTH] IN BLOOD BY AUTOMATED COUNT: 12.8 % (ref 11.5–14.5)
ESTIMATED AVG GLUCOSE: 157 MG/DL (ref 68–131)
HBA1C MFR BLD: 7.1 % (ref 4–5.6)
HCT VFR BLD AUTO: 50 % (ref 40–54)
HDLC SERPL-MCNC: 35 MG/DL (ref 40–75)
HDLC SERPL: 22.6 % (ref 20–50)
HGB BLD-MCNC: 16.6 G/DL (ref 14–18)
IMM GRANULOCYTES # BLD AUTO: 0.03 K/UL (ref 0–0.04)
IMM GRANULOCYTES NFR BLD AUTO: 0.5 % (ref 0–0.5)
LDLC SERPL CALC-MCNC: 79.6 MG/DL (ref 63–159)
LYMPHOCYTES # BLD AUTO: 1.7 K/UL (ref 1–4.8)
LYMPHOCYTES NFR BLD: 26.1 % (ref 18–48)
MCH RBC QN AUTO: 31.7 PG (ref 27–31)
MCHC RBC AUTO-ENTMCNC: 33.2 G/DL (ref 32–36)
MCV RBC AUTO: 96 FL (ref 82–98)
MONOCYTES # BLD AUTO: 0.7 K/UL (ref 0.3–1)
MONOCYTES NFR BLD: 10.1 % (ref 4–15)
NEUTROPHILS # BLD AUTO: 3.9 K/UL (ref 1.8–7.7)
NEUTROPHILS NFR BLD: 60.4 % (ref 38–73)
NONHDLC SERPL-MCNC: 120 MG/DL
NRBC BLD-RTO: 0 /100 WBC
PLATELET # BLD AUTO: 120 K/UL (ref 150–450)
PMV BLD AUTO: 10.9 FL (ref 9.2–12.9)
RBC # BLD AUTO: 5.23 M/UL (ref 4.6–6.2)
T4 FREE SERPL-MCNC: 0.99 NG/DL (ref 0.71–1.51)
TRIGL SERPL-MCNC: 202 MG/DL (ref 30–150)
TSH SERPL DL<=0.005 MIU/L-ACNC: 2.54 UIU/ML (ref 0.4–4)
URATE SERPL-MCNC: 5.6 MG/DL (ref 3.4–7)
WBC # BLD AUTO: 6.41 K/UL (ref 3.9–12.7)

## 2022-03-17 PROCEDURE — 83036 HEMOGLOBIN GLYCOSYLATED A1C: CPT | Performed by: INTERNAL MEDICINE

## 2022-03-17 PROCEDURE — 80061 LIPID PANEL: CPT | Performed by: INTERNAL MEDICINE

## 2022-03-17 PROCEDURE — 36415 COLL VENOUS BLD VENIPUNCTURE: CPT | Mod: PO | Performed by: INTERNAL MEDICINE

## 2022-03-17 PROCEDURE — 85025 COMPLETE CBC W/AUTO DIFF WBC: CPT | Performed by: INTERNAL MEDICINE

## 2022-03-17 PROCEDURE — 84550 ASSAY OF BLOOD/URIC ACID: CPT | Performed by: INTERNAL MEDICINE

## 2022-03-17 PROCEDURE — 84443 ASSAY THYROID STIM HORMONE: CPT | Performed by: INTERNAL MEDICINE

## 2022-03-17 PROCEDURE — 84439 ASSAY OF FREE THYROXINE: CPT | Performed by: INTERNAL MEDICINE

## 2022-03-21 RX ORDER — LISINOPRIL AND HYDROCHLOROTHIAZIDE 12.5; 2 MG/1; MG/1
TABLET ORAL
Qty: 180 TABLET | Refills: 3 | OUTPATIENT
Start: 2022-03-21

## 2022-03-24 ENCOUNTER — OFFICE VISIT (OUTPATIENT)
Dept: FAMILY MEDICINE | Facility: CLINIC | Age: 72
End: 2022-03-24
Payer: MEDICARE

## 2022-03-24 ENCOUNTER — PATIENT MESSAGE (OUTPATIENT)
Dept: FAMILY MEDICINE | Facility: CLINIC | Age: 72
End: 2022-03-24

## 2022-03-24 VITALS
OXYGEN SATURATION: 97 % | SYSTOLIC BLOOD PRESSURE: 132 MMHG | WEIGHT: 276.38 LBS | HEIGHT: 69 IN | DIASTOLIC BLOOD PRESSURE: 84 MMHG | HEART RATE: 74 BPM | BODY MASS INDEX: 40.93 KG/M2

## 2022-03-24 DIAGNOSIS — E03.4 HYPOTHYROIDISM DUE TO ACQUIRED ATROPHY OF THYROID: ICD-10-CM

## 2022-03-24 DIAGNOSIS — E78.49 OTHER HYPERLIPIDEMIA: ICD-10-CM

## 2022-03-24 DIAGNOSIS — D69.6 THROMBOCYTOPENIA: ICD-10-CM

## 2022-03-24 DIAGNOSIS — Z12.5 PROSTATE CANCER SCREENING: ICD-10-CM

## 2022-03-24 DIAGNOSIS — E11.42 TYPE 2 DIABETES MELLITUS WITH DIABETIC POLYNEUROPATHY, WITHOUT LONG-TERM CURRENT USE OF INSULIN: Primary | ICD-10-CM

## 2022-03-24 DIAGNOSIS — E11.42 DIABETIC POLYNEUROPATHY ASSOCIATED WITH TYPE 2 DIABETES MELLITUS: ICD-10-CM

## 2022-03-24 DIAGNOSIS — E66.01 MORBID OBESITY WITH BMI OF 40.0-44.9, ADULT: ICD-10-CM

## 2022-03-24 DIAGNOSIS — G47.33 OSA ON CPAP: ICD-10-CM

## 2022-03-24 DIAGNOSIS — I10 ESSENTIAL HYPERTENSION: ICD-10-CM

## 2022-03-24 DIAGNOSIS — E66.01 MORBID OBESITY DUE TO EXCESS CALORIES: ICD-10-CM

## 2022-03-24 DIAGNOSIS — M10.49 OTHER SECONDARY ACUTE GOUT OF MULTIPLE SITES: ICD-10-CM

## 2022-03-24 PROCEDURE — 99999 PR PBB SHADOW E&M-EST. PATIENT-LVL III: ICD-10-PCS | Mod: PBBFAC,,, | Performed by: INTERNAL MEDICINE

## 2022-03-24 PROCEDURE — 99214 PR OFFICE/OUTPT VISIT, EST, LEVL IV, 30-39 MIN: ICD-10-PCS | Mod: S$PBB,,, | Performed by: INTERNAL MEDICINE

## 2022-03-24 PROCEDURE — 99213 OFFICE O/P EST LOW 20 MIN: CPT | Mod: PBBFAC,PO | Performed by: INTERNAL MEDICINE

## 2022-03-24 PROCEDURE — 99999 PR PBB SHADOW E&M-EST. PATIENT-LVL III: CPT | Mod: PBBFAC,,, | Performed by: INTERNAL MEDICINE

## 2022-03-24 PROCEDURE — 99214 OFFICE O/P EST MOD 30 MIN: CPT | Mod: S$PBB,,, | Performed by: INTERNAL MEDICINE

## 2022-03-24 NOTE — PROGRESS NOTES
Patient ID: Tal Maldonado     Chief Complaint:   Chief Complaint   Patient presents with    wellness exam        HPI:  Routine follow-up for diabetes which is still well controlled with an A1c of 7.1 but this is slightly higher than his baseline.  He attributes this to dietary indiscretions after his brother stayed with him for 5 weeks but since he has moved back home he is back to his diet and his home sugars are anywhere in the 115-120 range.  His cholesterol is well controlled for diabetic.  He does need a diabetic urine microalbumin test which will get her next round of blood work.  His thrombocytopenia is stable and I really believe this is his baseline as an ultrasound of his abdomen last year did not show any splenomegaly.  He is due for PSA which I will include in the blood work.  He does see the podiatrist every few months for routine diabetic foot care.  I do recommend that he get a repeat Tdap at his pharmacy at his leisure.  I did give him another copy of the living will and medical power  form so that he can complete them and return them to me at his leisure.    Review of Systems   Constitutional: Negative.    HENT: Negative.    Eyes: Negative.    Respiratory: Negative.    Cardiovascular: Negative.    Gastrointestinal: Negative.    Endocrine: Negative.    Genitourinary: Negative.    Musculoskeletal: Negative.    Skin: Negative.    Allergic/Immunologic: Negative.    Neurological: Negative.    Hematological: Negative.    Psychiatric/Behavioral: Negative.           Objective:      Physical Exam   Physical Exam  Vitals and nursing note reviewed.   Constitutional:       Appearance: Normal appearance. He is well-developed. He is obese.   HENT:      Head: Normocephalic and atraumatic.      Nose: Nose normal.   Eyes:      Extraocular Movements: Extraocular movements intact.      Conjunctiva/sclera: Conjunctivae normal.      Pupils: Pupils are equal, round, and reactive to light.   Cardiovascular:       "Rate and Rhythm: Normal rate and regular rhythm.      Pulses: Normal pulses.      Heart sounds: Normal heart sounds.   Pulmonary:      Effort: Pulmonary effort is normal.      Breath sounds: Normal breath sounds.   Abdominal:      General: Bowel sounds are normal.      Palpations: Abdomen is soft.   Musculoskeletal:         General: Normal range of motion.      Cervical back: Normal range of motion and neck supple.   Skin:     General: Skin is warm and dry.      Capillary Refill: Capillary refill takes less than 2 seconds.   Neurological:      General: No focal deficit present.      Mental Status: He is alert and oriented to person, place, and time.   Psychiatric:         Mood and Affect: Mood normal.         Behavior: Behavior normal.         Thought Content: Thought content normal.         Judgment: Judgment normal.          Protective Sensation (w/ 10 gram monofilament):  Right: Decreased  Left: Decreased    Visual Inspection:  Normal -  Bilateral    Pedal Pulses:   Right: Present  Left: Present    Posterior tibialis:   Right:Present  Left: Present      Vitals:   Vitals:    03/24/22 0809   BP: 132/84   Pulse: 74   SpO2: 97%   Weight: 125.4 kg (276 lb 5.6 oz)   Height: 5' 9" (1.753 m)          Current Outpatient Medications:     allopurinoL (ZYLOPRIM) 100 MG tablet, Take 2 tablets (200 mg total) by mouth once daily., Disp: 180 tablet, Rfl: 3    amLODIPine (NORVASC) 10 MG tablet, TAKE 1 TABLET(10 MG) BY MOUTH EVERY DAY, Disp: 90 tablet, Rfl: 3    atorvastatin (LIPITOR) 40 MG tablet, TAKE 1 TABLET BY MOUTH EVERY NIGHT AT BEDTIME, Disp: 90 tablet, Rfl: 3    BD NIC 2ND GEN PEN NEEDLE 32 gauge x 5/32" Ndle, USE ONCE DAILY AS DIRECTED, Disp: 100 each, Rfl: 12    clotrimazole-betamethasone 1-0.05% (LOTRISONE) cream, Apply topically 2 (two) times daily., Disp: , Rfl:     FARXIGA 5 mg Tab tablet, TAKE 1 TABLET BY MOUTH DAILY, Disp: 90 tablet, Rfl: 3    finasteride (PROSCAR) 5 mg tablet, TAKE 1 TABLET(5 MG) BY MOUTH " EVERY DAY, Disp: 90 tablet, Rfl: 3    FREESTYLE LITE STRIPS Strp, once daily., Disp: , Rfl:     glipiZIDE (GLUCOTROL) 10 MG TR24, Take 1 tablet (10 mg total) by mouth daily with breakfast., Disp: 90 tablet, Rfl: 3    glucosamine-chondroitin 500-400 mg tablet, Take 1 tablet by mouth 3 (three) times daily., Disp: , Rfl:     indomethacin (INDOCIN) 50 MG capsule, Take 1 capsule (50 mg total) by mouth 2 (two) times daily as needed (gout flare)., Disp: 30 capsule, Rfl: 1    LANCETS MISC, by Misc.(Non-Drug; Combo Route) route., Disp: , Rfl:     levothyroxine (SYNTHROID) 150 MCG tablet, Take 1 tablet (150 mcg total) by mouth before breakfast., Disp: 90 tablet, Rfl: 3    lisinopriL-hydrochlorothiazide (PRINZIDE,ZESTORETIC) 20-12.5 mg per tablet, Take 1 tablet by mouth 2 (two) times a day., Disp: 180 tablet, Rfl: 3    metFORMIN (GLUMETZA) 1000 MG (MOD) 24hr tablet, Take 1 tablet (1,000 mg total) by mouth 2 (two) times daily with meals., Disp: 180 tablet, Rfl: 3    metoprolol succinate (TOPROL-XL) 200 MG 24 hr tablet, Take 1 tablet (200 mg total) by mouth once daily., Disp: 90 tablet, Rfl: 3    omega-3 fatty acids/fish oil (FISH OIL-OMEGA-3 FATTY ACIDS) 300-1,000 mg capsule, Take by mouth once daily., Disp: , Rfl:     tamsulosin (FLOMAX) 0.4 mg Cap, TAKE 1 CAPSULE BY MOUTH EVERY DAY 30 MINUTES AFTER A MEAL, Disp: 90 capsule, Rfl: 3    VICTOZA 2-MARIO 0.6 mg/0.1 mL (18 mg/3 mL) PnIj pen, ADMINISTER 1.8 MG(30 UNITS) UNDER THE SKIN EVERY DAY, Disp: 30 mL, Rfl: 6    aspirin (ECOTRIN) 81 MG EC tablet, Take 1 tablet (81 mg total) by mouth once daily., Disp: , Rfl: 0   Assessment:       Patient Active Problem List    Diagnosis Date Noted    Diabetic polyneuropathy associated with type 2 diabetes mellitus 03/24/2022    Hypothyroidism due to acquired atrophy of thyroid 09/24/2021    Colon cancer screening 03/12/2021    Thrombocytopenia 09/23/2020    Elevated liver enzymes 09/23/2020    Abnormal results of liver  function studies  09/23/2020    Essential hypertension 09/23/2020    Adenomatous polyp of colon 09/23/2020    Morbid obesity with BMI of 40.0-44.9, adult 06/08/2020    Hammer toes of both feet 06/08/2020    Acute gout of multiple sites 06/04/2020    Morbid obesity due to excess calories 06/04/2020    Benign prostatic hyperplasia without lower urinary tract symptoms     Diabetes mellitus     Hypertension     Other hyperlipidemia     Sensorineural hearing loss (SNHL) of left ear     WINSOME on CPAP     Colon polyps     Superficial thrombophlebitis     Arthritis of hand           Plan:       Tal Maldonado  was seen today for follow-up and may need lab work.    Diagnoses and all orders for this visit:    Tal was seen today for wellness exam.    Diagnoses and all orders for this visit:    Type 2 diabetes mellitus with diabetic polyneuropathy, without long-term current use of insulin  -     Lipid Panel; Future  -     Hemoglobin A1C; Future  -     Microalbumin/Creatinine Ratio, Urine; Future  Controlled     Prostate cancer screening  -     PSA, Screening; Future    Thrombocytopenia  -     CBC Auto Differential; Future  Stable and likely his baseline     Diabetic polyneuropathy associated with type 2 diabetes mellitus  Monitor    Morbid obesity with BMI of 40.0-44.9, adult  Monitor    Essential hypertension  Controlled    Hypothyroidism due to acquired atrophy of thyroid  Controlled    Other hyperlipidemia  Controlled    Other secondary acute gout of multiple sites  Controlled    Morbid obesity due to excess calories  Monitor    WINSOME on CPAP  May need a new CPAP

## 2022-03-29 ENCOUNTER — DOCUMENTATION ONLY (OUTPATIENT)
Dept: FAMILY MEDICINE | Facility: CLINIC | Age: 72
End: 2022-03-29
Payer: MEDICARE

## 2022-04-05 DIAGNOSIS — Z71.89 COMPLEX CARE COORDINATION: ICD-10-CM

## 2022-04-16 ENCOUNTER — PATIENT MESSAGE (OUTPATIENT)
Dept: FAMILY MEDICINE | Facility: CLINIC | Age: 72
End: 2022-04-16
Payer: MEDICARE

## 2022-04-16 DIAGNOSIS — E03.4 HYPOTHYROIDISM DUE TO ACQUIRED ATROPHY OF THYROID: ICD-10-CM

## 2022-04-16 NOTE — TELEPHONE ENCOUNTER
No new care gaps identified.  Powered by [employer]. Reference number: [id number].   4/16/2022 9:22:30 AM CDT

## 2022-04-16 NOTE — TELEPHONE ENCOUNTER
Refill Routing Note   Medication(s) are not appropriate for processing by [facility] for the following reason(s):      - Required laboratory values are outdated    [facility] action(s):  Defer          Medication reconciliation completed: No     Appointments  past 12m or future 3m with PCP    Date Provider   Last Visit   3/24/2022 Marlon Iglesias MD   Next Visit   9/29/2022 Marlon Iglesias MD   ED visits in past 90 days: 0        Note composed:5:22 PM 04/16/2022

## 2022-04-18 RX ORDER — LEVOTHYROXINE SODIUM 150 UG/1
150 TABLET ORAL
Qty: 90 TABLET | Refills: 0 | Status: SHIPPED | OUTPATIENT
Start: 2022-04-18 | End: 2022-07-16

## 2022-04-18 NOTE — TELEPHONE ENCOUNTER
No new care gaps identified.  Powered by [employer]. Reference number: [id number].   4/18/2022 9:53:59 AM CDT

## 2022-04-19 RX ORDER — METFORMIN HYDROCHLORIDE 1000 MG/1
TABLET ORAL
Qty: 180 TABLET | Refills: 3 | Status: SHIPPED | OUTPATIENT
Start: 2022-04-19 | End: 2023-04-04

## 2022-04-20 ENCOUNTER — TELEPHONE (OUTPATIENT)
Dept: PODIATRY | Facility: CLINIC | Age: 72
End: 2022-04-20
Payer: MEDICARE

## 2022-04-20 NOTE — TELEPHONE ENCOUNTER
----- Message from Amina Huang MA sent at 4/20/2022  3:14 PM CDT -----  Contact: MEREDITH MITCHELL [[id number]]  Type: Needs Medical Advice    Who Called: MEREDITH MITCHELL [[id number]]  Best Call Back Number: [phone]  Inquiry/Question: Would you kindly call MEREDITH MITCHELL [[id number]] regarding rescheduling appt  Thank you~

## 2022-04-27 ENCOUNTER — OFFICE VISIT (OUTPATIENT)
Dept: PODIATRY | Facility: CLINIC | Age: 72
End: 2022-04-27
Payer: MEDICARE

## 2022-04-27 VITALS — BODY MASS INDEX: 40.94 KG/M2 | WEIGHT: 276.44 LBS | HEIGHT: 69 IN

## 2022-04-27 DIAGNOSIS — M20.41 HAMMER TOES OF BOTH FEET: ICD-10-CM

## 2022-04-27 DIAGNOSIS — B35.1 ONYCHOMYCOSIS DUE TO DERMATOPHYTE: ICD-10-CM

## 2022-04-27 DIAGNOSIS — E11.42 DIABETIC POLYNEUROPATHY ASSOCIATED WITH TYPE 2 DIABETES MELLITUS: Primary | ICD-10-CM

## 2022-04-27 DIAGNOSIS — Q82.8 POROKERATOSIS: ICD-10-CM

## 2022-04-27 DIAGNOSIS — E66.01 MORBID OBESITY WITH BMI OF 40.0-44.9, ADULT: ICD-10-CM

## 2022-04-27 DIAGNOSIS — M20.42 HAMMER TOES OF BOTH FEET: ICD-10-CM

## 2022-04-27 PROCEDURE — 11721 DEBRIDE NAIL 6 OR MORE: CPT | Mod: PBBFAC,PN | Performed by: PODIATRIST

## 2022-04-27 PROCEDURE — 11721 PR DEBRIDEMENT OF NAILS, 6 OR MORE: ICD-10-PCS | Mod: 59,Q9,S$PBB, | Performed by: PODIATRIST

## 2022-04-27 PROCEDURE — 99999 PR PBB SHADOW E&M-EST. PATIENT-LVL II: CPT | Mod: PBBFAC,,, | Performed by: PODIATRIST

## 2022-04-27 PROCEDURE — 99213 PR OFFICE/OUTPT VISIT, EST, LEVL III, 20-29 MIN: ICD-10-PCS | Mod: 25,S$PBB,, | Performed by: PODIATRIST

## 2022-04-27 PROCEDURE — 99999 PR PBB SHADOW E&M-EST. PATIENT-LVL II: ICD-10-PCS | Mod: PBBFAC,,, | Performed by: PODIATRIST

## 2022-04-27 PROCEDURE — 11056 PARNG/CUTG B9 HYPRKR LES 2-4: CPT | Mod: Q9,PBBFAC,PN | Performed by: PODIATRIST

## 2022-04-27 PROCEDURE — 11056 PARNG/CUTG B9 HYPRKR LES 2-4: CPT | Mod: Q9,S$PBB,, | Performed by: PODIATRIST

## 2022-04-27 PROCEDURE — 99213 OFFICE O/P EST LOW 20 MIN: CPT | Mod: 25,S$PBB,, | Performed by: PODIATRIST

## 2022-04-27 PROCEDURE — 99212 OFFICE O/P EST SF 10 MIN: CPT | Mod: PBBFAC,PN | Performed by: PODIATRIST

## 2022-04-27 PROCEDURE — 11056 PR TRIM BENIGN HYPERKERATOTIC SKIN LESION,2-4: ICD-10-PCS | Mod: Q9,S$PBB,, | Performed by: PODIATRIST

## 2022-04-27 PROCEDURE — 11721 DEBRIDE NAIL 6 OR MORE: CPT | Mod: 59,Q9,S$PBB, | Performed by: PODIATRIST

## 2022-04-27 NOTE — PROGRESS NOTES
Subjective:      Patient ID: Tal Maldonado is a 71 y.o. male.    Chief Complaint: Diabetes Mellitus and Diabetic Foot Exam ([facility] 3/4/22)    Tal is a 71 y.o. male who presents to the clinic upon referral from Dr. Iglesias  for evaluation and treatment of diabetic feet. Tal has a past medical history of Amblyopia, Arthritis of hand, BPH (benign prostatic hyperplasia), Cataract, Colon polyps, COPD (chronic obstructive pulmonary disease), Coronary artery disease, Diabetes mellitus, Hypertension, WINSOME on CPAP, Other hyperlipidemia, Sensorineural hearing loss (SNHL) of left ear, and Superficial thrombophlebitis. Patient relates  having toenails that are in need of trimming.  Denies being painful with wearing shoe gear.  Has not attempted to treat.  Notes his blood glucose is slightly higher than it was at the last visit.  Denies overt neuropathy pain symptoms with today's exam.  Denies any additional pedal complaints.    PCP: Marlon Iglesias MD    Date Last Seen by PCP: 3/22    Hemoglobin A1C   Date Value Ref Range Status   03/17/2022 7.1 (H) 4.0 - 5.6 % Final     Comment:     ADA Screening Guidelines:  5.7-6.4%  Consistent with prediabetes  >or=6.5%  Consistent with diabetes    High levels of fetal hemoglobin interfere with the HbA1C  assay. Heterozygous hemoglobin variants (HbS, HgC, etc)do  not significantly interfere with this assay.   However, presence of multiple variants may affect accuracy.     09/17/2021 6.9 (H) 4.0 - 5.6 % Final     Comment:     ADA Screening Guidelines:  5.7-6.4%  Consistent with prediabetes  >or=6.5%  Consistent with diabetes    High levels of fetal hemoglobin interfere with the HbA1C  assay. Heterozygous hemoglobin variants (HbS, HgC, etc)do  not significantly interfere with this assay.   However, presence of multiple variants may affect accuracy.     03/19/2021 6.6 (H) 4.0 - 5.6 % Final     Comment:     ADA Screening Guidelines:  5.7-6.4%  Consistent with prediabetes  >or=6.5%  Consistent  with diabetes    High levels of fetal hemoglobin interfere with the HbA1C  assay. Heterozygous hemoglobin variants (HbS, HgC, etc)do  not significantly interfere with this assay.   However, presence of multiple variants may affect accuracy.             Past Medical History:   Diagnosis Date    Amblyopia     OD    Arthritis of hand     BPH (benign prostatic hyperplasia)     Cataract     Colon polyps     COPD (chronic obstructive pulmonary disease)     Coronary artery disease     Diabetes mellitus     Hypertension     WINSOME on CPAP     Other hyperlipidemia     Sensorineural hearing loss (SNHL) of left ear     Superficial thrombophlebitis        Past Surgical History:   Procedure Laterality Date    APPENDECTOMY      blephroplasty Bilateral 2009    COLONOSCOPY      COLONOSCOPY N/A 3/12/2021    Procedure: COLONOSCOPY;  Surgeon: Dandre Hobbs Jr., MD;  Location: [facility];  Service: Endoscopy;  Laterality: N/A;    DUPUYTREN CONTRACTURE RELEASE Bilateral     HERNIA REPAIR      Umbilical    TONSILLECTOMY         Family History   Problem Relation Age of Onset    Hypertension Mother     Diabetes Mother     Cataracts Mother     Glaucoma Mother         ?    Emphysema Father     Prostate cancer Brother     Macular degeneration Neg Hx     Retinal detachment Neg Hx     Strabismus Neg Hx        Social History     Socioeconomic History    Marital status:    Occupational History    Occupation: [employer]    Tobacco Use    Smoking status: Never Smoker    Smokeless tobacco: Never Used   Substance and Sexual Activity    Alcohol use: Yes     Alcohol/week: 7.0 standard drinks     Types: 7 Glasses of wine per week    Drug use: Never    Sexual activity: Yes     Partners: Female   Social History Narrative    ** Merged History Encounter **            Current Outpatient Medications   Medication Sig Dispense Refill    allopurinoL (ZYLOPRIM) 100 MG tablet Take 2 tablets (200 mg total) by mouth  "once daily. 180 tablet 3    amLODIPine (NORVASC) 10 MG tablet TAKE 1 TABLET(10 MG) BY MOUTH EVERY DAY 90 tablet 3    atorvastatin (LIPITOR) 40 MG tablet TAKE 1 TABLET BY MOUTH EVERY NIGHT AT BEDTIME 90 tablet 3    BD NIC 2ND GEN PEN NEEDLE 32 gauge x 5/32" Ndle USE ONCE DAILY AS DIRECTED 100 each 12    clotrimazole-betamethasone 1-0.05% (LOTRISONE) cream Apply topically 2 (two) times daily.      FARXIGA 5 mg Tab tablet TAKE 1 TABLET BY MOUTH DAILY 90 tablet 3    finasteride (PROSCAR) 5 mg tablet TAKE 1 TABLET(5 MG) BY MOUTH EVERY DAY 90 tablet 3    FREESTYLE LITE STRIPS Strp once daily.      glipiZIDE (GLUCOTROL) 10 MG TR24 Take 1 tablet (10 mg total) by mouth daily with breakfast. 90 tablet 3    glucosamine-chondroitin 500-400 mg tablet Take 1 tablet by mouth 3 (three) times daily.      indomethacin (INDOCIN) 50 MG capsule Take 1 capsule (50 mg total) by mouth 2 (two) times daily as needed (gout flare). 30 capsule 1    LANCETS MISC by Misc.(Non-Drug; Combo Route) route.      levothyroxine (SYNTHROID) 150 MCG tablet Take 1 tablet (150 mcg total) by mouth before breakfast. 90 tablet 0    lisinopriL-hydrochlorothiazide (PRINZIDE,ZESTORETIC) 20-12.5 mg per tablet Take 1 tablet by mouth 2 (two) times a day. 180 tablet 3    metFORMIN (GLUCOPHAGE) 1000 MG tablet TAKE 1 TABLET(1000 MG) BY MOUTH TWICE DAILY WITH MEALS 180 tablet 3    metoprolol succinate (TOPROL-XL) 200 MG 24 hr tablet Take 1 tablet (200 mg total) by mouth once daily. 90 tablet 3    omega-3 fatty acids/fish oil (FISH OIL-OMEGA-3 FATTY ACIDS) 300-1,000 mg capsule Take by mouth once daily.      tamsulosin (FLOMAX) 0.4 mg Cap TAKE 1 CAPSULE BY MOUTH EVERY DAY 30 MINUTES AFTER A MEAL 90 capsule 3    VICTOZA 2-MARIO 0.6 mg/0.1 mL (18 mg/3 mL) PnIj pen ADMINISTER 1.8 MG(30 UNITS) UNDER THE SKIN EVERY DAY 30 mL 6    aspirin (ECOTRIN) 81 MG EC tablet Take 1 tablet (81 mg total) by mouth once daily.  0     No current facility-administered " medications for this visit.       Review of patient's allergies indicates:  No Known Allergies      Review of Systems   Constitutional: Negative for chills and fever.   Cardiovascular: Positive for leg swelling. Negative for claudication.   Skin: Positive for color change, nail changes and suspicious lesions.   Musculoskeletal: Positive for joint swelling. Negative for joint pain, muscle cramps, muscle weakness and myalgias.   Neurological: Negative for numbness and paresthesias.   Psychiatric/Behavioral: Negative for altered mental status.           Objective:      Physical Exam  Constitutional:       General: He is not in acute distress.     Appearance: He is well-developed. He is not diaphoretic.   Cardiovascular:      Pulses:           Dorsalis pedis pulses are 2+ on the right side and 2+ on the left side.        Posterior tibial pulses are 2+ on the right side and 2+ on the left side.      Comments: CFT is < 3 seconds bilateral.  Pedal hair growth is present bilateral.  Mild varicosities noted bilateral.  Moderate non pitting lower extremity edema noted bilateral.  Toes are warm to touch bilateral.    Musculoskeletal:         General: No tenderness.      Comments: Muscle strength 5/5 in all muscle groups bilateral.  No tenderness nor crepitation with ROM of foot/ankle joints bilateral.  No tenderness with palpation of bilateral foot and ankle.  Bilateral pes planus foot type.  Bilateral semi-rigid contracture of toes 2-5.   Skin:     General: Skin is warm and dry.      Capillary Refill: Capillary refill takes 2 to 3 seconds.      Coloration: Skin is not pale.      Findings: Lesion present. No abrasion, bruising, burn, ecchymosis, erythema, laceration or rash.      Comments: Pedal skin appears edematous bilateral.  Toenails x 10 appear thickened by 2 mm elongated by 6 mm, and discolored with subungual debris.  Porokeratoses noted to the Lt. Forefoot (3 lesions) and the Rt. Forefoot (1 lesion).  Rt. Forefoot  porokeratoses are centered around the distal 1st intermetatarsal space and Lt. Foot porokeratoses noted to the 3rd intermetatarsal space.     Neurological:      Mental Status: He is alert and oriented to person, place, and time.      Sensory: Sensory deficit present.      Motor: No weakness or atrophy.      Comments: Protective sensation per [place] monofilament is intact bilateral.  Vibratory sensation is decreased bilateral.  Light touch is intact bilateral.               Assessment:       Encounter Diagnoses   Name Primary?    Diabetic polyneuropathy associated with type 2 diabetes mellitus Yes    Hammer toes of both feet     Morbid obesity with BMI of 40.0-44.9, adult     Onychomycosis due to dermatophyte     Porokeratosis          Plan:       Tal was seen today for diabetes mellitus and diabetic foot exam.    Diagnoses and all orders for this visit:    Diabetic polyneuropathy associated with type 2 diabetes mellitus    Hammer toes of both feet    Morbid obesity with BMI of 40.0-44.9, adult    Onychomycosis due to dermatophyte    Porokeratosis      I counseled the patient on his conditions, their implications and medical management.    Advised to continue wearing shoe gear that accommodates digital deformities.    Discussed the importance of diet and exercise as they pertain to diabetes management and maintaining a healthy BMI.    Shoe inspection. Diabetic Foot Education. Patient reminded of the importance of good nutrition and blood sugar control to help prevent podiatric complications of diabetes. Patient instructed on proper foot hygeine. We discussed wearing proper shoe gear, daily foot inspections, never walking without protective shoe gear, never putting sharp instruments to feet.    With patient's permission, nails were aggressively reduced and debrided x 10  to their soft tissue attachment mechanically and with electric , removing all offending nail and debris.  With the patient's  verbal consent, a sterile #15 scalpel was used to trim lesions x 4 down to smooth appearing skin without incident.  Patient relates relief following the procedure. He will continue to monitor the areas daily, inspect his feet, wear protective shoe gear when ambulatory, moisturizer to maintain skin integrity and follow in this office in approximately 4 months, sooner p.r.n.    Follow up in about 4 months (around 8/27/2022).    Alex Olson DPM

## 2022-05-09 ENCOUNTER — PATIENT MESSAGE (OUTPATIENT)
Dept: SMOKING CESSATION | Facility: CLINIC | Age: 72
End: 2022-05-09
Payer: MEDICARE

## 2022-05-14 ENCOUNTER — PATIENT MESSAGE (OUTPATIENT)
Dept: FAMILY MEDICINE | Facility: CLINIC | Age: 72
End: 2022-05-14
Payer: MEDICARE

## 2022-05-16 ENCOUNTER — PATIENT MESSAGE (OUTPATIENT)
Dept: FAMILY MEDICINE | Facility: CLINIC | Age: 72
End: 2022-05-16
Payer: MEDICARE

## 2022-05-16 DIAGNOSIS — L40.9 PSORIASIS: Primary | ICD-10-CM

## 2022-05-17 ENCOUNTER — PATIENT MESSAGE (OUTPATIENT)
Dept: FAMILY MEDICINE | Facility: CLINIC | Age: 72
End: 2022-05-17
Payer: MEDICARE

## 2022-05-17 DIAGNOSIS — L40.9 PSORIASIS: ICD-10-CM

## 2022-05-24 RX ORDER — METOPROLOL SUCCINATE 200 MG/1
TABLET, EXTENDED RELEASE ORAL
Qty: 90 TABLET | Refills: 3 | Status: SHIPPED | OUTPATIENT
Start: 2022-05-24 | End: 2023-05-04

## 2022-05-24 NOTE — TELEPHONE ENCOUNTER
Care Due:                  Date            Visit Type   Department     Provider  --------------------------------------------------------------------------------                                EP -                              [facility] FAMILY  Last Visit: 03-      CARE ([facility])   WAYNE Iglesias                               -                              [facility]  Next Visit: 09-      CARE ([facility])   WAYNE Iglesias                                                            Last  Test          Frequency    Reason                     Performed    Due Date  --------------------------------------------------------------------------------    CMP.........  12 months..  FARXIGA, allopurinoL,      03- 03-                             atorvastatin, glipiZIDE,                             lisinopriL-hydrochlorothi                             azide, metFORMIN.........    [facility] Embedded Care Gaps. Reference number: [id number]. 5/24/2022   8:51:11 AM CDT

## 2022-05-25 NOTE — TELEPHONE ENCOUNTER
Refill Authorization Note   Tal Maldonado  is requesting a refill authorization.  Brief Assessment and Rationale for Refill:  Approve    -Medication-Related Problems Identified: Requires labs  Medication Therapy Plan:       Medication Reconciliation Completed: No   Comments:     Provider Staff:     Action is required for this patient.   Please see care gap opportunities below in Care Due Message.     Thanks!  [facility]     Appointments      Date Provider   Last Visit   3/24/2022 Marlon Iglesias MD   Next Visit   9/29/2022 Marlon Iglesias MD     Note composed:7:43 PM 05/24/2022           Note composed:7:43 PM 05/24/2022

## 2022-06-08 ENCOUNTER — PATIENT MESSAGE (OUTPATIENT)
Dept: FAMILY MEDICINE | Facility: CLINIC | Age: 72
End: 2022-06-08
Payer: MEDICARE

## 2022-07-14 ENCOUNTER — PATIENT MESSAGE (OUTPATIENT)
Dept: FAMILY MEDICINE | Facility: CLINIC | Age: 72
End: 2022-07-14
Payer: MEDICARE

## 2022-07-16 ENCOUNTER — PATIENT MESSAGE (OUTPATIENT)
Dept: FAMILY MEDICINE | Facility: CLINIC | Age: 72
End: 2022-07-16
Payer: MEDICARE

## 2022-07-16 DIAGNOSIS — E03.4 HYPOTHYROIDISM DUE TO ACQUIRED ATROPHY OF THYROID: ICD-10-CM

## 2022-07-16 RX ORDER — LEVOTHYROXINE SODIUM 150 UG/1
TABLET ORAL
Qty: 90 TABLET | Refills: 2 | Status: SHIPPED | OUTPATIENT
Start: 2022-07-16 | End: 2023-04-04

## 2022-07-16 RX ORDER — LEVOTHYROXINE SODIUM 150 UG/1
150 TABLET ORAL
Qty: 90 TABLET | Refills: 0 | Status: CANCELLED | OUTPATIENT
Start: 2022-07-16

## 2022-07-16 NOTE — TELEPHONE ENCOUNTER
No new care gaps identified.  [facility] Embedded Care Gaps. Reference number: [id number]. 7/16/2022   1:20:42 PM CDT

## 2022-07-16 NOTE — TELEPHONE ENCOUNTER
No new care gaps identified.  [facility] Embedded Care Gaps. Reference number: [id number]. 7/16/2022   9:19:18 AM CDT

## 2022-07-17 NOTE — TELEPHONE ENCOUNTER
Refill Decision Note   Tal Maldonado  is requesting a refill authorization.  Brief Assessment and Rationale for Refill:  Approve     Medication Therapy Plan:       Medication Reconciliation Completed: No   Comments:     No Care Gaps recommended.     Note composed:7:47 PM 07/16/2022

## 2022-07-20 NOTE — TELEPHONE ENCOUNTER
No new care gaps identified.  [facility] Embedded Care Gaps. Reference number: [id number]. 7/20/2022   12:42:48 PM CDT

## 2022-07-21 RX ORDER — AMLODIPINE BESYLATE 10 MG/1
TABLET ORAL
Qty: 90 TABLET | Refills: 3 | Status: SHIPPED | OUTPATIENT
Start: 2022-07-21 | End: 2022-10-24 | Stop reason: SDUPTHER

## 2022-07-21 NOTE — TELEPHONE ENCOUNTER
Refill Routing Note   Medication(s) are not appropriate for processing by [facility] for the following reason(s):      - Drug-Disease Interaction (amLODIPine and Abnormal results of liver function studies)    [facility] action(s):  Defer Medication-related problems identified: Drug-disease interaction        Medication reconciliation completed: No     Appointments  past 12m or future 3m with PCP    Date Provider   Last Visit   3/24/2022 Marlon Iglesias MD   Next Visit   9/29/2022 Marlon Iglesias MD   ED visits in past 90 days: 0        Note composed:8:07 PM 07/20/2022

## 2022-09-14 DIAGNOSIS — E11.42 DIABETIC POLYNEUROPATHY ASSOCIATED WITH TYPE 2 DIABETES MELLITUS: ICD-10-CM

## 2022-09-22 ENCOUNTER — OFFICE VISIT (OUTPATIENT)
Dept: PODIATRY | Facility: CLINIC | Age: 72
End: 2022-09-22
Payer: MEDICARE

## 2022-09-22 VITALS — BODY MASS INDEX: 40.81 KG/M2 | HEIGHT: 69 IN | WEIGHT: 275.56 LBS

## 2022-09-22 DIAGNOSIS — E11.42 DIABETIC POLYNEUROPATHY ASSOCIATED WITH TYPE 2 DIABETES MELLITUS: Primary | ICD-10-CM

## 2022-09-22 DIAGNOSIS — B35.1 ONYCHOMYCOSIS DUE TO DERMATOPHYTE: ICD-10-CM

## 2022-09-22 DIAGNOSIS — M20.41 HAMMER TOES OF BOTH FEET: ICD-10-CM

## 2022-09-22 DIAGNOSIS — E66.01 MORBID OBESITY WITH BMI OF 40.0-44.9, ADULT: ICD-10-CM

## 2022-09-22 DIAGNOSIS — Q82.8 POROKERATOSIS: ICD-10-CM

## 2022-09-22 DIAGNOSIS — M20.42 HAMMER TOES OF BOTH FEET: ICD-10-CM

## 2022-09-22 PROCEDURE — 11721 DEBRIDE NAIL 6 OR MORE: CPT | Mod: 59,Q9,S$PBB, | Performed by: PODIATRIST

## 2022-09-22 PROCEDURE — 11056 PARNG/CUTG B9 HYPRKR LES 2-4: CPT | Mod: PBBFAC,PN | Performed by: PODIATRIST

## 2022-09-22 PROCEDURE — 11721 DEBRIDE NAIL 6 OR MORE: CPT | Performed by: PODIATRIST

## 2022-09-22 PROCEDURE — 99999 PR PBB SHADOW E&M-EST. PATIENT-LVL III: CPT | Mod: PBBFAC,,, | Performed by: PODIATRIST

## 2022-09-22 PROCEDURE — 11056 PR TRIM BENIGN HYPERKERATOTIC SKIN LESION,2-4: ICD-10-PCS | Mod: Q9,S$PBB,, | Performed by: PODIATRIST

## 2022-09-22 PROCEDURE — 99999 PR PBB SHADOW E&M-EST. PATIENT-LVL III: ICD-10-PCS | Mod: PBBFAC,,, | Performed by: PODIATRIST

## 2022-09-22 PROCEDURE — 99499 NO LOS: ICD-10-PCS | Mod: S$PBB,,, | Performed by: PODIATRIST

## 2022-09-22 PROCEDURE — 99213 OFFICE O/P EST LOW 20 MIN: CPT | Mod: PBBFAC,PN | Performed by: PODIATRIST

## 2022-09-22 PROCEDURE — 99499 UNLISTED E&M SERVICE: CPT | Mod: S$PBB,,, | Performed by: PODIATRIST

## 2022-09-22 PROCEDURE — 11721 PR DEBRIDEMENT OF NAILS, 6 OR MORE: ICD-10-PCS | Mod: 59,Q9,S$PBB, | Performed by: PODIATRIST

## 2022-09-22 PROCEDURE — 11056 PARNG/CUTG B9 HYPRKR LES 2-4: CPT | Mod: Q9,S$PBB,, | Performed by: PODIATRIST

## 2022-09-22 NOTE — PROGRESS NOTES
Subjective:      Patient ID: Tal Maldonado is a 72 y.o. male.    Chief Complaint: No chief complaint on file.    Tal is a 72 y.o. male who presents to the clinic upon referral from Dr. Iglesias  for evaluation and treatment of diabetic feet. Tal has a past medical history of Amblyopia, Arthritis of hand, BPH (benign prostatic hyperplasia), Cataract, Colon polyps, COPD (chronic obstructive pulmonary disease), Coronary artery disease, Diabetes mellitus, Hypertension, WINSOME on CPAP, Other hyperlipidemia, Sensorineural hearing loss (SNHL) of left ear, and Superficial thrombophlebitis. Patient relates  having toenails that are in need of trimming.  Denies being painful with wearing shoe gear.  Has not attempted to treat.  Denies overt neuropathy pain with today's exam.  Notes his blood glucose has been pretty well controlled since the last exam.  Denies any additional pedal complaints.    PCP: Marlon Iglesias MD    Date Last Seen by PCP: 3/22    Hemoglobin A1C   Date Value Ref Range Status   03/17/2022 7.1 (H) 4.0 - 5.6 % Final     Comment:     ADA Screening Guidelines:  5.7-6.4%  Consistent with prediabetes  >or=6.5%  Consistent with diabetes    High levels of fetal hemoglobin interfere with the HbA1C  assay. Heterozygous hemoglobin variants (HbS, HgC, etc)do  not significantly interfere with this assay.   However, presence of multiple variants may affect accuracy.     09/17/2021 6.9 (H) 4.0 - 5.6 % Final     Comment:     ADA Screening Guidelines:  5.7-6.4%  Consistent with prediabetes  >or=6.5%  Consistent with diabetes    High levels of fetal hemoglobin interfere with the HbA1C  assay. Heterozygous hemoglobin variants (HbS, HgC, etc)do  not significantly interfere with this assay.   However, presence of multiple variants may affect accuracy.     03/19/2021 6.6 (H) 4.0 - 5.6 % Final     Comment:     ADA Screening Guidelines:  5.7-6.4%  Consistent with prediabetes  >or=6.5%  Consistent with diabetes    High levels of  fetal hemoglobin interfere with the HbA1C  assay. Heterozygous hemoglobin variants (HbS, HgC, etc)do  not significantly interfere with this assay.   However, presence of multiple variants may affect accuracy.             Past Medical History:   Diagnosis Date    Amblyopia     OD    Arthritis of hand     BPH (benign prostatic hyperplasia)     Cataract     Colon polyps     COPD (chronic obstructive pulmonary disease)     Coronary artery disease     Diabetes mellitus     Hypertension     WINSOME on CPAP     Other hyperlipidemia     Sensorineural hearing loss (SNHL) of left ear     Superficial thrombophlebitis        Past Surgical History:   Procedure Laterality Date    APPENDECTOMY      blephroplasty Bilateral 2009    COLONOSCOPY      COLONOSCOPY N/A 3/12/2021    Procedure: COLONOSCOPY;  Surgeon: Dandre Hobbs Jr., MD;  Location: [facility];  Service: Endoscopy;  Laterality: N/A;    DUPUYTREN CONTRACTURE RELEASE Bilateral     HERNIA REPAIR      Umbilical    TONSILLECTOMY         Family History   Problem Relation Age of Onset    Hypertension Mother     Diabetes Mother     Cataracts Mother     Glaucoma Mother         ?    Emphysema Father     Prostate cancer Brother     Macular degeneration Neg Hx     Retinal detachment Neg Hx     Strabismus Neg Hx        Social History     Socioeconomic History    Marital status:    Occupational History    Occupation: [employer] Dist[employer]    Tobacco Use    Smoking status: Never    Smokeless tobacco: Never   Substance and Sexual Activity    Alcohol use: Yes     Alcohol/week: 7.0 standard drinks     Types: 7 Glasses of wine per week    Drug use: Never    Sexual activity: Yes     Partners: Female   Social History Narrative    ** Merged History Encounter **            Current Outpatient Medications   Medication Sig Dispense Refill    allopurinoL (ZYLOPRIM) 100 MG tablet TAKE 2 TABLETS(200 MG) BY MOUTH EVERY  tablet 3    amLODIPine (NORVASC) 10 MG tablet TAKE 1 TABLET(10 MG) BY MOUTH  "EVERY DAY 90 tablet 3    aspirin (ECOTRIN) 81 MG EC tablet Take 1 tablet (81 mg total) by mouth once daily.  0    atorvastatin (LIPITOR) 40 MG tablet TAKE 1 TABLET BY MOUTH EVERY NIGHT AT BEDTIME 90 tablet 3    BD NIC 2ND GEN PEN NEEDLE 32 gauge x 5/32" Ndle USE ONCE DAILY AS DIRECTED 100 each 12    calcipotriene-betamethasone 0.005-0.064 % Crea Apply 1 application topically once daily at 6am. 60 g 3    clotrimazole-betamethasone 1-0.05% (LOTRISONE) cream Apply topically 2 (two) times daily.      FARXIGA 5 mg Tab tablet TAKE 1 TABLET BY MOUTH DAILY 90 tablet 3    finasteride (PROSCAR) 5 mg tablet TAKE 1 TABLET(5 MG) BY MOUTH EVERY DAY 90 tablet 3    FREESTYLE LITE STRIPS Strp once daily.      glipiZIDE (GLUCOTROL) 10 MG TR24 Take 1 tablet (10 mg total) by mouth daily with breakfast. 90 tablet 3    glucosamine-chondroitin 500-400 mg tablet Take 1 tablet by mouth 3 (three) times daily.      indomethacin (INDOCIN) 50 MG capsule Take 1 capsule (50 mg total) by mouth 2 (two) times daily as needed (gout flare). 30 capsule 1    LANCETS MISC by Misc.(Non-Drug; Combo Route) route.      levothyroxine (SYNTHROID) 150 MCG tablet TAKE 1 TABLET(150 MCG) BY MOUTH BEFORE BREAKFAST 90 tablet 2    lisinopriL-hydrochlorothiazide (PRINZIDE,ZESTORETIC) 20-12.5 mg per tablet Take 1 tablet by mouth 2 (two) times a day. 180 tablet 3    metFORMIN (GLUCOPHAGE) 1000 MG tablet TAKE 1 TABLET(1000 MG) BY MOUTH TWICE DAILY WITH MEALS 180 tablet 3    metoprolol succinate (TOPROL-XL) 200 MG 24 hr tablet TAKE 1 TABLET(200 MG) BY MOUTH EVERY DAY 90 tablet 3    omega-3 fatty acids/fish oil (FISH OIL-OMEGA-3 FATTY ACIDS) 300-1,000 mg capsule Take by mouth once daily.      tamsulosin (FLOMAX) 0.4 mg Cap TAKE 1 CAPSULE BY MOUTH EVERY DAY 30 MINUTES AFTER A MEAL 90 capsule 3    VICTOZA 2-MARIO 0.6 mg/0.1 mL (18 mg/3 mL) PnIj pen ADMINISTER 1.8 MG(30 UNITS) UNDER THE SKIN EVERY DAY 30 mL 6     No current facility-administered medications for this visit. "       Review of patient's allergies indicates:  No Known Allergies      Review of Systems   Constitutional: Negative for chills and fever.   Cardiovascular:  Positive for leg swelling. Negative for claudication.   Skin:  Positive for color change, nail changes and suspicious lesions.   Musculoskeletal:  Positive for joint swelling. Negative for joint pain, muscle cramps, muscle weakness and myalgias.   Neurological:  Negative for numbness and paresthesias.   Psychiatric/Behavioral:  Negative for altered mental status.          Objective:      Physical Exam  Constitutional:       General: He is not in acute distress.     Appearance: He is well-developed. He is not diaphoretic.   Cardiovascular:      Pulses:           Dorsalis pedis pulses are 2+ on the right side and 2+ on the left side.        Posterior tibial pulses are 2+ on the right side and 2+ on the left side.      Comments: CFT is < 3 seconds bilateral.  Pedal hair growth is present bilateral.  Mild varicosities noted bilateral.  Moderate non pitting lower extremity edema noted bilateral.  Toes are warm to touch bilateral.    Musculoskeletal:         General: No tenderness.      Comments: Muscle strength 5/5 in all muscle groups bilateral.  No tenderness nor crepitation with ROM of foot/ankle joints bilateral.  No tenderness with palpation of bilateral foot and ankle.  Bilateral pes planus foot type.  Bilateral semi-rigid contracture of toes 2-5.   Skin:     General: Skin is warm and dry.      Capillary Refill: Capillary refill takes 2 to 3 seconds.      Coloration: Skin is not pale.      Findings: Lesion present. No abrasion, bruising, burn, ecchymosis, erythema, laceration or rash.      Comments: Pedal skin appears edematous bilateral.  Toenails x 10 appear thickened by 2 mm elongated by 6 mm, and discolored with subungual debris.  Porokeratoses noted to the Lt. Forefoot (3 lesions) and the Rt. Forefoot (1 lesion).  Rt. Forefoot porokeratoses are centered  around the distal 1st intermetatarsal space and Lt. Foot porokeratoses noted to the 3rd intermetatarsal space.  No break noted in adjacent skin integrity.      Neurological:      Mental Status: He is alert and oriented to person, place, and time.      Sensory: Sensory deficit present.      Motor: No weakness or atrophy.      Comments: Protective sensation per [place] monofilament is intact bilateral.  Vibratory sensation is decreased bilateral.  Light touch is intact bilateral.             Assessment:       Encounter Diagnoses   Name Primary?    Diabetic polyneuropathy associated with type 2 diabetes mellitus Yes    Hammer toes of both feet     Onychomycosis due to dermatophyte     Porokeratosis     Morbid obesity with BMI of 40.0-44.9, adult          Plan:       Diagnoses and all orders for this visit:    Diabetic polyneuropathy associated with type 2 diabetes mellitus    Hammer toes of both feet    Onychomycosis due to dermatophyte    Porokeratosis    Morbid obesity with BMI of 40.0-44.9, adult    I counseled the patient on his conditions, their implications and medical management.    Advised to continue wearing shoe gear that accommodates digital deformities.    Discussed the importance of diet and exercise as they pertain to diabetes management and maintaining a healthy BMI.    Shoe inspection. Diabetic Foot Education. Patient reminded of the importance of good nutrition and blood sugar control to help prevent podiatric complications of diabetes. Patient instructed on proper foot hygeine. We discussed wearing proper shoe gear, daily foot inspections, never walking without protective shoe gear, never putting sharp instruments to feet.    With patient's permission, nails were aggressively reduced and debrided x 10  to their soft tissue attachment mechanically and with electric , removing all offending nail and debris.  With the patient's verbal consent, a sterile #15 scalpel was used to trim  lesions x 4 down to smooth appearing skin without incident.  Patient relates relief following the procedure. He will continue to monitor the areas daily, inspect his feet, wear protective shoe gear when ambulatory, moisturizer to maintain skin integrity and follow in this office in approximately 4 months, sooner p.r.n.    Alex Olson DPM

## 2022-09-23 ENCOUNTER — LAB VISIT (OUTPATIENT)
Dept: LAB | Facility: HOSPITAL | Age: 72
End: 2022-09-23
Attending: INTERNAL MEDICINE
Payer: MEDICARE

## 2022-09-23 DIAGNOSIS — E11.42 TYPE 2 DIABETES MELLITUS WITH DIABETIC POLYNEUROPATHY, WITHOUT LONG-TERM CURRENT USE OF INSULIN: ICD-10-CM

## 2022-09-23 LAB
ALBUMIN/CREAT UR: 30 UG/MG (ref 0–30)
CREAT UR-MCNC: 110 MG/DL (ref 23–375)
MICROALBUMIN UR DL<=1MG/L-MCNC: 33 UG/ML

## 2022-09-23 PROCEDURE — 82570 ASSAY OF URINE CREATININE: CPT | Performed by: INTERNAL MEDICINE

## 2022-09-24 ENCOUNTER — PATIENT MESSAGE (OUTPATIENT)
Dept: FAMILY MEDICINE | Facility: CLINIC | Age: 72
End: 2022-09-24
Payer: MEDICARE

## 2022-09-29 ENCOUNTER — E-VISIT (OUTPATIENT)
Dept: FAMILY MEDICINE | Facility: CLINIC | Age: 72
End: 2022-09-29
Payer: MEDICARE

## 2022-09-29 ENCOUNTER — PATIENT MESSAGE (OUTPATIENT)
Dept: FAMILY MEDICINE | Facility: CLINIC | Age: 72
End: 2022-09-29

## 2022-09-29 DIAGNOSIS — U07.1 COVID-19 VIRUS INFECTION: Primary | ICD-10-CM

## 2022-09-29 PROCEDURE — 99422 PR E&M, ONLINE DIGIT, EST, < 7 DAYS,  11-20 MINS: ICD-10-PCS | Mod: CR,,, | Performed by: INTERNAL MEDICINE

## 2022-09-29 PROCEDURE — 99422 OL DIG E/M SVC 11-20 MIN: CPT | Mod: CR,,, | Performed by: INTERNAL MEDICINE

## 2022-09-29 NOTE — PROGRESS NOTES
Patient ID: Tal Maldonado is a 72 y.o. male.    Chief Complaint: Cough    The patient initiated a request through [employer] on 9/29/2022 for evaluation and management with a chief complaint of Cough     I evaluated the questionnaire submission on 9/29/2022.    Ohs Peq Evisit Upper Respitatory/Cough Questionnaire    9/29/2022  9:23 AM CDT - Filed by Patient   Do you agree to participate in an E-Visit? Yes   If you have any of the following symptoms, please present to your local ER or call 911:  I acknowledge   What is the main issue that you would like for your doctor to address today? Positive covid test   Are you able to take your vital signs? Yes   Systolic Blood Pressure: 130   Diastolic Blood Pressure: 80   Weight: 275   Height: 69   Pulse: 75   Temp: 99.6   Resp: 94   Pulse Ox: 94   What symptoms do you currently have?  Cough;  Fatigue;  Headache;  Runny nose;  Sore throat   Have you had a fever? Yes   What has been the range of your fever? Less than 100.4   When did your symptoms first appear? 9/28/2022   In the last two weeks, have you been in close contact with someone who has COVID-19? No   In the last two weeks, have you worked or volunteered in a healthcare facility or as a ? Healthcare facilities include a hospital, medical or dental clinic, long-term care facility, or nursing home No   Do you live in a long-term care facility, nursing home, or homeless shelter? Yes   List what you have done or taken to help your symptoms. Tylenol   How severe are your symptoms? Moderate   Have you taken an at home Covid test? Yes   What were the results? Positive   Have you been fully vaccinated for COVID? (2 Pfizer, 2 Moderna or 1 Kris & Kris vaccine injections) Yes   Have you received a booster? Yes   Do you have transportation to get tested for COVID if it is indicated and ordered for you at an [facility] location? Yes   Provide any information you feel is important to your history not asked above     Please attach any relevant images or files         Active Problem List with Overview Notes    Diagnosis Date Noted    Diabetic polyneuropathy associated with type 2 diabetes mellitus 03/24/2022    Hypothyroidism due to acquired atrophy of thyroid 09/24/2021    Colon cancer screening 03/12/2021    Thrombocytopenia 09/23/2020    Elevated liver enzymes 09/23/2020    Abnormal results of liver function studies  09/23/2020    Essential hypertension 09/23/2020    Adenomatous polyp of colon 09/23/2020    Morbid obesity with BMI of 40.0-44.9, adult 06/08/2020    Hammer toes of both feet 06/08/2020    Acute gout of multiple sites 06/04/2020    Morbid obesity due to excess calories 06/04/2020    Benign prostatic hyperplasia without lower urinary tract symptoms     Diabetes mellitus     Hypertension     Other hyperlipidemia     Sensorineural hearing loss (SNHL) of left ear     WINSOME on CPAP     Colon polyps     Superficial thrombophlebitis     Arthritis of hand       Recent Labs Obtained:  Lab Visit on 09/23/2022   Component Date Value Ref Range Status    Microalbumin, Urine 09/23/2022 33.0  ug/mL Final    Creatinine, Urine 09/23/2022 110.0  23.0 - 375.0 mg/dL Final    Microalb/Creat Ratio 09/23/2022 30.0  0.0 - 30.0 ug/mg Final   Lab Visit on 09/23/2022   Component Date Value Ref Range Status    Cholesterol 09/23/2022 150  120 - 199 mg/dL Final    Comment: The National Cholesterol Education Program (NCEP) has set the  following guidelines (reference ranges) for Cholesterol:  Optimal.....................<200 mg/dL  Borderline High.............200-239 mg/dL  High........................> or = 240 mg/dL      Triglycerides 09/23/2022 157 (H)  30 - 150 mg/dL Final    Comment: The National Cholesterol Education Program (NCEP) has set the  following guidelines (reference values) for triglycerides:  Normal......................<150 mg/dL  Borderline High.............150-199 mg/dL  High........................200-499 mg/dL      HDL  09/23/2022 38 (L)  40 - 75 mg/dL Final    Comment: The National Cholesterol Education Program (NCEP) has set the  following guidelines (reference values) for HDL Cholesterol:  Low...............<40 mg/dL  Optimal...........>60 mg/dL      LDL Cholesterol 09/23/2022 80.6  63.0 - 159.0 mg/dL Final    Comment: The National Cholesterol Education Program (NCEP) has set the  following guidelines (reference values) for LDL Cholesterol:  Optimal.......................<130 mg/dL  Borderline High...............130-159 mg/dL  High..........................160-189 mg/dL  Very High.....................>190 mg/dL      HDL/Cholesterol Ratio 09/23/2022 25.3  20.0 - 50.0 % Final    Total Cholesterol/HDL Ratio 09/23/2022 3.9  2.0 - 5.0 Final    Non-HDL Cholesterol 09/23/2022 112  mg/dL Final    Comment: Risk category and Non-HDL cholesterol goals:  Coronary heart disease (CHD)or equivalent (10-year risk of CHD >20%):  Non-HDL cholesterol goal     <130 mg/dL  Two or more CHD risk factors and 10-year risk of CHD <= 20%:  Non-HDL cholesterol goal     <160 mg/dL  0 to 1 CHD risk factor:  Non-HDL cholesterol goal     <190 mg/dL      Hemoglobin A1C 09/23/2022 6.8 (H)  4.0 - 5.6 % Final    Comment: ADA Screening Guidelines:  5.7-6.4%  Consistent with prediabetes  >or=6.5%  Consistent with diabetes    High levels of fetal hemoglobin interfere with the HbA1C  assay. Heterozygous hemoglobin variants (HbS, HgC, etc)do  not significantly interfere with this assay.   However, presence of multiple variants may affect accuracy.      Estimated Avg Glucose 09/23/2022 148 (H)  68 - 131 mg/dL Final    PSA, Screen 09/23/2022 0.61  0.00 - 4.00 ng/mL Final    Comment: The testing method is a chemiluminescent microparticle immunoassay   manufactured by [employer] and performed on the    or   [employer] system. Values obtained with different assay manufacturers   for   methods may be different and cannot be used interchangeably.  PSA  Expected levels:  Hormonal Therapy: <0.05 ng/ml  Prostatectomy: <0.01 ng/ml  Radiation Therapy: <1.00 ng/ml      WBC 09/23/2022 6.54  3.90 - 12.70 K/uL Final    RBC 09/23/2022 5.17  4.60 - 6.20 M/uL Final    Hemoglobin 09/23/2022 16.3  14.0 - 18.0 g/dL Final    Hematocrit 09/23/2022 49.1  40.0 - 54.0 % Final    MCV 09/23/2022 95  82 - 98 fL Final    MCH 09/23/2022 31.5 (H)  27.0 - 31.0 pg Final    MCHC 09/23/2022 33.2  32.0 - 36.0 g/dL Final    RDW 09/23/2022 13.1  11.5 - 14.5 % Final    Platelets 09/23/2022 128 (L)  150 - 450 K/uL Final    MPV 09/23/2022 11.3  9.2 - 12.9 fL Final    Immature Granulocytes 09/23/2022 0.6 (H)  0.0 - 0.5 % Final    Gran # (ANC) 09/23/2022 3.8  1.8 - 7.7 K/uL Final    Immature Grans (Abs) 09/23/2022 0.04  0.00 - 0.04 K/uL Final    Comment: Mild elevation in immature granulocytes is non specific and   can be seen in a variety of conditions including stress response,   acute inflammation, trauma and pregnancy. Correlation with other   laboratory and clinical findings is essential.      Lymph # 09/23/2022 1.9  1.0 - 4.8 K/uL Final    Mono # 09/23/2022 0.7  0.3 - 1.0 K/uL Final    Eos # 09/23/2022 0.2  0.0 - 0.5 K/uL Final    Baso # 09/23/2022 0.03  0.00 - 0.20 K/uL Final    nRBC 09/23/2022 0  0 /100 WBC Final    Gran % 09/23/2022 57.8  38.0 - 73.0 % Final    Lymph % 09/23/2022 28.4  18.0 - 48.0 % Final    Mono % 09/23/2022 9.9  4.0 - 15.0 % Final    Eosinophil % 09/23/2022 2.8  0.0 - 8.0 % Final    Basophil % 09/23/2022 0.5  0.0 - 1.9 % Final    Differential Method 09/23/2022 Automated   Final    Sodium 09/23/2022 140  136 - 145 mmol/L Final    Potassium 09/23/2022 3.8  3.5 - 5.1 mmol/L Final    Chloride 09/23/2022 101  95 - 110 mmol/L Final    CO2 09/23/2022 29  23 - 29 mmol/L Final    Glucose 09/23/2022 171 (H)  70 - 110 mg/dL Final    BUN 09/23/2022 17  8 - 23 mg/dL Final    Creatinine 09/23/2022 1.0  0.5 - 1.4 mg/dL Final    Calcium 09/23/2022 9.4  8.7 - 10.5 mg/dL Final    Total  Protein 2022 6.5  6.0 - 8.4 g/dL Final    Albumin 2022 4.0  3.5 - 5.2 g/dL Final    Total Bilirubin 2022 1.0  0.1 - 1.0 mg/dL Final    Comment: For infants and newborns, interpretation of results should be based  on gestational age, weight and in agreement with clinical  observations.    Premature Infant recommended reference ranges:  Up to 24 hours.............<8.0 mg/dL  Up to 48 hours............<12.0 mg/dL  3-5 days..................<15.0 mg/dL  6-29 days.................<15.0 mg/dL      Alkaline Phosphatase 2022 67  55 - 135 U/L Final    AST 2022 21  10 - 40 U/L Final    ALT 2022 37  10 - 44 U/L Final    Anion Gap 2022 10  8 - 16 mmol/L Final    eGFR 2022 >60.0  >60 mL/min/1.73 m^2 Final       Encounter Diagnosis   Name Primary?    COVID-19 virus infection Yes        No orders of the defined types were placed in this encounter.     Medications Ordered This Encounter   Medications    nirmatrelvir-ritonavir 300 mg (150 mg x 2)-100 mg copackaged tablets (EUA)     Sig: Take 3 tablets by mouth 2 (two) times daily for 5 days. Each dose contains 2 nirmatrelvir (pink tablets) and 1 ritonavir (white tablet). Take all 3 tablets together     Dispense:  30 tablet     Refill:  0     Order Specific Question:   Per EUA criteria, patient has a positive COVID test AND symptom onset </= 5 days     Answer:   Yes        E-Visit Time Trackin mins

## 2022-10-24 ENCOUNTER — PATIENT MESSAGE (OUTPATIENT)
Dept: FAMILY MEDICINE | Facility: CLINIC | Age: 72
End: 2022-10-24
Payer: MEDICARE

## 2022-10-24 DIAGNOSIS — I10 ESSENTIAL HYPERTENSION: Primary | ICD-10-CM

## 2022-10-24 NOTE — TELEPHONE ENCOUNTER
No new care gaps identified.  [facility] Embedded Care Gaps. Reference number: [id number]. 10/24/2022   2:03:01 PM CDT

## 2022-10-25 RX ORDER — AMLODIPINE BESYLATE 10 MG/1
10 TABLET ORAL DAILY
Qty: 90 TABLET | Refills: 3 | Status: SHIPPED | OUTPATIENT
Start: 2022-10-25 | End: 2023-10-05

## 2022-10-25 NOTE — TELEPHONE ENCOUNTER
Refill Routing Note   Medication(s) are not appropriate for processing by [facility] for the following reason(s):      - Drug-Disease Interaction (amLODIPine and Abnormal results of liver function studies)    [facility] action(s):  Defer Medication-related problems identified: Drug-disease interaction        Medication reconciliation completed: No     Appointments  past 12m or future 3m with PCP    Date Provider   Last Visit   3/24/2022 Marlon Iglesias MD   Next Visit   11/7/2022 Marlon Iglesias MD   ED visits in past 90 days: 0        Note composed:5:37 AM 10/25/2022

## 2022-10-27 DIAGNOSIS — E11.9 TYPE 2 DIABETES MELLITUS WITHOUT COMPLICATION, WITHOUT LONG-TERM CURRENT USE OF INSULIN: ICD-10-CM

## 2022-10-27 RX ORDER — LIRAGLUTIDE 6 MG/ML
INJECTION SUBCUTANEOUS
Qty: 27 ML | Refills: 0 | Status: SHIPPED | OUTPATIENT
Start: 2022-10-27 | End: 2023-02-08 | Stop reason: SDUPTHER

## 2022-10-27 NOTE — TELEPHONE ENCOUNTER
Refill Decision Note   Tal Maldonado  is requesting a refill authorization.  Brief Assessment and Rationale for Refill:  Approve     Medication Therapy Plan:       Medication Reconciliation Completed: No   Comments:     No Care Gaps recommended.     Note composed:9:18 AM 10/27/2022

## 2022-10-27 NOTE — TELEPHONE ENCOUNTER
No new care gaps identified.  [facility] Embedded Care Gaps. Reference number: [id number]. 10/27/2022   8:54:59 AM ALEXT

## 2022-11-01 RX ORDER — PEN NEEDLE, DIABETIC 30 GX3/16"
NEEDLE, DISPOSABLE MISCELLANEOUS
Qty: 100 EACH | Refills: 3 | Status: SHIPPED | OUTPATIENT
Start: 2022-11-01

## 2022-11-01 NOTE — TELEPHONE ENCOUNTER
No new care gaps identified.  [facility] Embedded Care Gaps. Reference number: [id number]. 11/01/2022   11:04:23 AM ALEXT

## 2022-11-02 NOTE — TELEPHONE ENCOUNTER
Refill Decision Note   Tal Maldonado  is requesting a refill authorization.  Brief Assessment and Rationale for Refill:  Approve     Medication Therapy Plan:       Medication Reconciliation Completed: No   Comments:     No Care Gaps recommended.     Note composed:11:53 PM 11/01/2022

## 2022-11-03 DIAGNOSIS — Z71.89 COMPLEX CARE COORDINATION: ICD-10-CM

## 2022-11-07 ENCOUNTER — OFFICE VISIT (OUTPATIENT)
Dept: FAMILY MEDICINE | Facility: CLINIC | Age: 72
End: 2022-11-07
Payer: MEDICARE

## 2022-11-07 VITALS
DIASTOLIC BLOOD PRESSURE: 70 MMHG | HEIGHT: 69 IN | SYSTOLIC BLOOD PRESSURE: 132 MMHG | BODY MASS INDEX: 40.85 KG/M2 | WEIGHT: 275.81 LBS | OXYGEN SATURATION: 96 % | HEART RATE: 80 BPM

## 2022-11-07 DIAGNOSIS — E11.42 TYPE 2 DIABETES MELLITUS WITH DIABETIC POLYNEUROPATHY, WITHOUT LONG-TERM CURRENT USE OF INSULIN: ICD-10-CM

## 2022-11-07 DIAGNOSIS — M10.49 OTHER SECONDARY ACUTE GOUT OF MULTIPLE SITES: ICD-10-CM

## 2022-11-07 DIAGNOSIS — D69.6 THROMBOCYTOPENIA: ICD-10-CM

## 2022-11-07 DIAGNOSIS — G47.33 OSA ON CPAP: ICD-10-CM

## 2022-11-07 DIAGNOSIS — E66.01 MORBID OBESITY WITH BMI OF 40.0-44.9, ADULT: ICD-10-CM

## 2022-11-07 DIAGNOSIS — E78.49 OTHER HYPERLIPIDEMIA: ICD-10-CM

## 2022-11-07 DIAGNOSIS — L40.9 PSORIASIS: ICD-10-CM

## 2022-11-07 DIAGNOSIS — Z00.00 WELLNESS EXAMINATION: Primary | ICD-10-CM

## 2022-11-07 DIAGNOSIS — I10 ESSENTIAL HYPERTENSION: ICD-10-CM

## 2022-11-07 DIAGNOSIS — E03.4 HYPOTHYROIDISM DUE TO ACQUIRED ATROPHY OF THYROID: ICD-10-CM

## 2022-11-07 PROCEDURE — 99999 PR PBB SHADOW E&M-EST. PATIENT-LVL IV: ICD-10-PCS | Mod: PBBFAC,,, | Performed by: INTERNAL MEDICINE

## 2022-11-07 PROCEDURE — 99214 OFFICE O/P EST MOD 30 MIN: CPT | Mod: S$PBB,,, | Performed by: INTERNAL MEDICINE

## 2022-11-07 PROCEDURE — 99999 PR PBB SHADOW E&M-EST. PATIENT-LVL IV: CPT | Mod: PBBFAC,,, | Performed by: INTERNAL MEDICINE

## 2022-11-07 PROCEDURE — 99214 OFFICE O/P EST MOD 30 MIN: CPT | Mod: PBBFAC,PO | Performed by: INTERNAL MEDICINE

## 2022-11-07 PROCEDURE — 99214 PR OFFICE/OUTPT VISIT, EST, LEVL IV, 30-39 MIN: ICD-10-PCS | Mod: S$PBB,,, | Performed by: INTERNAL MEDICINE

## 2022-11-07 NOTE — PROGRESS NOTES
Patient ID: Tal Maldonado     Chief Complaint:   Chief Complaint   Patient presents with    Annual Exam        HPI:  Annual exam doing well overall.  I did review his labs and everything looks good except his LDL is just slightly elevated at 80.  I would like him to take his atorvastatin before bedtime and that should take care of it.  His A1c has come down to 6.8 I am very happy about that.  His blood pressure is just slightly elevated today so going to recheck it before he leaves.  He did start exercising to lose weight and I agree with that.  He is compliant with CPAP and is getting very good benefit from it but he needs another machine.  He is going to send me a copy of his original sleep study.  He just had COVID a few weeks ago someone let him decide if he wants a COVID booster right now or he would like to wait for the new inversions come out.  He is up-to-date with the rest of his health maintenance.  He did send me a copy of his living will medical power  but I cannot find in our system so I want him to send me another copy.    Review of Systems   Constitutional: Negative.    HENT: Negative.     Eyes: Negative.    Respiratory: Negative.     Cardiovascular: Negative.    Gastrointestinal: Negative.    Endocrine: Negative.    Genitourinary: Negative.    Musculoskeletal: Negative.    Skin: Negative.    Allergic/Immunologic: Negative.    Neurological: Negative.    Hematological: Negative.    Psychiatric/Behavioral: Negative.          Objective:      Physical Exam   Physical Exam  Vitals and nursing note reviewed.   Constitutional:       Appearance: Normal appearance. He is well-developed. He is obese.   HENT:      Head: Normocephalic and atraumatic.      Nose: Nose normal.      Mouth/Throat:      Mouth: Mucous membranes are moist.   Eyes:      Extraocular Movements: Extraocular movements intact.      Conjunctiva/sclera: Conjunctivae normal.      Pupils: Pupils are equal, round, and reactive to light.  "  Cardiovascular:      Rate and Rhythm: Normal rate and regular rhythm.      Pulses: Normal pulses.      Heart sounds: Normal heart sounds.   Pulmonary:      Effort: Pulmonary effort is normal.      Breath sounds: Normal breath sounds.   Abdominal:      General: Bowel sounds are normal.      Palpations: Abdomen is soft.   Musculoskeletal:         General: Normal range of motion.      Cervical back: Normal range of motion and neck supple.   Skin:     General: Skin is warm and dry.      Capillary Refill: Capillary refill takes less than 2 seconds.   Neurological:      General: No focal deficit present.      Mental Status: He is alert and oriented to person, place, and time.   Psychiatric:         Mood and Affect: Mood normal.         Behavior: Behavior normal.         Thought Content: Thought content normal.         Judgment: Judgment normal.          Vitals:   Vitals:    11/07/22 0913   BP: (!) 146/74   BP Location: Right arm   Pulse: 80   SpO2: 96%   Weight: 125.1 kg (275 lb 12.7 oz)   Height: 5' 9" (1.753 m)          Current Outpatient Medications:     allopurinoL (ZYLOPRIM) 100 MG tablet, TAKE 2 TABLETS(200 MG) BY MOUTH EVERY DAY, Disp: 180 tablet, Rfl: 3    amLODIPine (NORVASC) 10 MG tablet, Take 1 tablet (10 mg total) by mouth once daily., Disp: 90 tablet, Rfl: 3    atorvastatin (LIPITOR) 40 MG tablet, TAKE 1 TABLET BY MOUTH EVERY NIGHT AT BEDTIME, Disp: 90 tablet, Rfl: 3    calcipotriene-betamethasone 0.005-0.064 % Crea, Apply 1 application topically once daily at 6am., Disp: 60 g, Rfl: 3    clotrimazole-betamethasone 1-0.05% (LOTRISONE) cream, Apply topically 2 (two) times daily., Disp: , Rfl:     FARXIGA 5 mg Tab tablet, TAKE 1 TABLET BY MOUTH DAILY, Disp: 90 tablet, Rfl: 3    finasteride (PROSCAR) 5 mg tablet, TAKE 1 TABLET(5 MG) BY MOUTH EVERY DAY, Disp: 90 tablet, Rfl: 3    FREESTYLE LITE STRIPS Strp, once daily., Disp: , Rfl:     glipiZIDE (GLUCOTROL) 10 MG TR24, Take 1 tablet (10 mg total) by mouth daily " "with breakfast., Disp: 90 tablet, Rfl: 3    glucosamine-chondroitin 500-400 mg tablet, Take 1 tablet by mouth 3 (three) times daily., Disp: , Rfl:     indomethacin (INDOCIN) 50 MG capsule, Take 1 capsule (50 mg total) by mouth 2 (two) times daily as needed (gout flare)., Disp: 30 capsule, Rfl: 1    LANCETS MISC, by Misc.(Non-Drug; Combo Route) route., Disp: , Rfl:     levothyroxine (SYNTHROID) 150 MCG tablet, TAKE 1 TABLET(150 MCG) BY MOUTH BEFORE BREAKFAST, Disp: 90 tablet, Rfl: 2    lisinopriL-hydrochlorothiazide (PRINZIDE,ZESTORETIC) 20-12.5 mg per tablet, Take 1 tablet by mouth 2 (two) times a day., Disp: 180 tablet, Rfl: 3    metFORMIN (GLUCOPHAGE) 1000 MG tablet, TAKE 1 TABLET(1000 MG) BY MOUTH TWICE DAILY WITH MEALS, Disp: 180 tablet, Rfl: 3    metoprolol succinate (TOPROL-XL) 200 MG 24 hr tablet, TAKE 1 TABLET(200 MG) BY MOUTH EVERY DAY, Disp: 90 tablet, Rfl: 3    omega-3 fatty acids/fish oil (FISH OIL-OMEGA-3 FATTY ACIDS) 300-1,000 mg capsule, Take by mouth once daily., Disp: , Rfl:     pen needle, diabetic (BD NIC 2ND GEN PEN NEEDLE) 32 gauge x 5/32" Ndle, USE ONCE DAILY AS DIRECTED, Disp: 100 each, Rfl: 3    tamsulosin (FLOMAX) 0.4 mg Cap, TAKE 1 CAPSULE BY MOUTH EVERY DAY 30 MINUTES AFTER A MEAL, Disp: 90 capsule, Rfl: 3    VICTOZA 3-MARIO 0.6 mg/0.1 mL (18 mg/3 mL) PnIj pen, ADMINISTER 1.8 MG UNDER THE SKIN EVERY DAY, Disp: 27 mL, Rfl: 0    aspirin (ECOTRIN) 81 MG EC tablet, Take 1 tablet (81 mg total) by mouth once daily., Disp: , Rfl: 0   Assessment:       Patient Active Problem List    Diagnosis Date Noted    Psoriasis 11/07/2022    Diabetic polyneuropathy associated with type 2 diabetes mellitus 03/24/2022    Hypothyroidism due to acquired atrophy of thyroid 09/24/2021    Colon cancer screening 03/12/2021    Thrombocytopenia 09/23/2020    Elevated liver enzymes 09/23/2020    Abnormal results of liver function studies  09/23/2020    Essential hypertension 09/23/2020    Adenomatous polyp of colon " 09/23/2020    Morbid obesity with BMI of 40.0-44.9, adult 06/08/2020    Hammer toes of both feet 06/08/2020    Acute gout of multiple sites 06/04/2020    Benign prostatic hyperplasia without lower urinary tract symptoms     Diabetes mellitus     Other hyperlipidemia     Sensorineural hearing loss (SNHL) of left ear     WINSOME on CPAP     Colon polyps     Superficial thrombophlebitis     Arthritis of hand           Plan:       Tal Maldonado  was seen today for follow-up and may need lab work.    Diagnoses and all orders for this visit:    Tal was seen today for annual exam.    Diagnoses and all orders for this visit:    Wellness examination    Other hyperlipidemia  -     Lipid Panel; Future  -     Comprehensive Metabolic Panel; Future  Check labs after taking Atorvastatin at night     Essential hypertension  -     CBC Auto Differential; Future  Recheck Blood Pressure     Thrombocytopenia  Stable    Morbid obesity with BMI of 40.0-44.9, adult  Monitor    Hypothyroidism due to acquired atrophy of thyroid  -     T4, Free; Future  -     TSH; Future  Check labs      WINSOME on CPAP  Controlled    Psoriasis  Controlled    Type 2 diabetes mellitus with diabetic polyneuropathy, without long-term current use of insulin  -     Hemoglobin A1C; Future  Controlled with med     Other secondary acute gout of multiple sites  -     Uric Acid; Future   Controlled

## 2022-11-15 ENCOUNTER — TELEPHONE (OUTPATIENT)
Dept: FAMILY MEDICINE | Facility: CLINIC | Age: 72
End: 2022-11-15
Payer: MEDICARE

## 2022-11-15 DIAGNOSIS — G47.33 OSA (OBSTRUCTIVE SLEEP APNEA): Primary | ICD-10-CM

## 2022-11-28 ENCOUNTER — PATIENT MESSAGE (OUTPATIENT)
Dept: FAMILY MEDICINE | Facility: CLINIC | Age: 72
End: 2022-11-28
Payer: MEDICARE

## 2022-11-29 ENCOUNTER — PATIENT MESSAGE (OUTPATIENT)
Dept: FAMILY MEDICINE | Facility: CLINIC | Age: 72
End: 2022-11-29
Payer: MEDICARE

## 2022-12-02 RX ORDER — ATORVASTATIN CALCIUM 40 MG/1
TABLET, FILM COATED ORAL
Qty: 90 TABLET | Refills: 3 | Status: SHIPPED | OUTPATIENT
Start: 2022-12-02 | End: 2023-03-30 | Stop reason: SDUPTHER

## 2022-12-02 NOTE — TELEPHONE ENCOUNTER
Refill Decision Note   Tal Maldonado  is requesting a refill authorization.  Brief Assessment and Rationale for Refill:  Approve     Medication Therapy Plan:       Medication Reconciliation Completed: No   Comments:     No Care Gaps recommended.     Note composed:5:46 PM 12/02/2022

## 2022-12-02 NOTE — TELEPHONE ENCOUNTER
No new care gaps identified.  [facility] Embedded Care Gaps. Reference number: [id number]. 12/02/2022   9:54:32 AM CST

## 2023-01-09 ENCOUNTER — OFFICE VISIT (OUTPATIENT)
Dept: OPTOMETRY | Facility: CLINIC | Age: 73
End: 2023-01-09
Payer: MEDICARE

## 2023-01-09 DIAGNOSIS — E11.9 TYPE 2 DIABETES MELLITUS WITHOUT RETINOPATHY: Primary | ICD-10-CM

## 2023-01-09 DIAGNOSIS — H52.7 REFRACTIVE ERROR: ICD-10-CM

## 2023-01-09 DIAGNOSIS — H25.13 NUCLEAR SCLEROSIS OF BOTH EYES: ICD-10-CM

## 2023-01-09 DIAGNOSIS — H53.001 AMBLYOPIA OF RIGHT EYE: ICD-10-CM

## 2023-01-09 PROCEDURE — 99999 PR PBB SHADOW E&M-EST. PATIENT-LVL III: CPT | Mod: PBBFAC,,, | Performed by: OPTOMETRIST

## 2023-01-09 PROCEDURE — 92015 DETERMINE REFRACTIVE STATE: CPT | Mod: ,,, | Performed by: OPTOMETRIST

## 2023-01-09 PROCEDURE — 92015 PR REFRACTION: ICD-10-PCS | Mod: ,,, | Performed by: OPTOMETRIST

## 2023-01-09 PROCEDURE — 99999 PR PBB SHADOW E&M-EST. PATIENT-LVL III: ICD-10-PCS | Mod: PBBFAC,,, | Performed by: OPTOMETRIST

## 2023-01-09 PROCEDURE — 99213 OFFICE O/P EST LOW 20 MIN: CPT | Mod: PBBFAC,PO | Performed by: OPTOMETRIST

## 2023-01-09 PROCEDURE — 92014 PR EYE EXAM, EST PATIENT,COMPREHESV: ICD-10-PCS | Mod: S$PBB,,, | Performed by: OPTOMETRIST

## 2023-01-09 PROCEDURE — 92014 COMPRE OPH EXAM EST PT 1/>: CPT | Mod: S$PBB,,, | Performed by: OPTOMETRIST

## 2023-01-09 NOTE — PROGRESS NOTES
HPI     Diabetic Eye Exam     Additional comments: DM eye exam           Comments    DLS: 1/6/22    Pt states no changes in va since last visit. No floaters or flashes.     LBSL: 160 this am  Hemoglobin A1C       Date                     Value               Ref Range             Status                09/23/2022               6.8 (H)             4.0 - 5.6 %           Final                 03/17/2022               7.1 (H)             4.0 - 5.6 %           Final                 09/17/2021               6.9 (H)             4.0 - 5.6 %           Final                    Last edited by Cheryle Quintana on 1/9/2023  8:30 AM.            Assessment /Plan     For exam results, see Encounter Report.    Type 2 diabetes mellitus without retinopathy    Nuclear sclerosis of both eyes    Refractive error    Amblyopia of right eye      No diabetic retinopathy, no csme. Return in 1 year for dilated eye exam.   2. Educated pt on presence of cataracts and effects on vision. No surgery at this time. Recheck in one year.   3. Spectacle Rx given, discussed different options for glasses. RTC 1 year routine eye exam.   4. Since childhood, eso.

## 2023-01-13 RX ORDER — BLOOD-GLUCOSE METER
KIT MISCELLANEOUS
Qty: 100 STRIP | Refills: 3 | Status: SHIPPED | OUTPATIENT
Start: 2023-01-13 | End: 2024-01-25

## 2023-01-13 NOTE — TELEPHONE ENCOUNTER
Care Due:                  Date            Visit Type   Department     Provider  --------------------------------------------------------------------------------                                MYCHART                              ANNUAL                              CHECKUP/PHY  [facility]  Last Visit: 11-      S            MEDICINE       Marlon Iglesias                               -                              PRIMARY      [facility]  Next Visit: 05-      CARE ([facility])   MEDICINE       Marlon Iglesias                                                            Last  Test          Frequency    Reason                     Performed    Due Date  --------------------------------------------------------------------------------    HBA1C.......  6 months...  [place] RIOS,          09- 03-                             glipiZIDE, metFORMIN.....    Uric Acid...  12 months..  allopurinoL..............  03- 03-    [facility] Embedded Care Gaps. Reference number: [id number]. 1/13/2023   9:06:12 AM CST  
Refill Decision Note   Tal Maldonado  is requesting a refill authorization.  Brief Assessment and Rationale for Refill:  Approve     Medication Therapy Plan:  FOVS; FLOS    Medication Reconciliation Completed: No   Comments:     No Care Gaps recommended.     Note composed:11:40 AM 01/13/2023          
0

## 2023-01-18 ENCOUNTER — IMMUNIZATION (OUTPATIENT)
Dept: FAMILY MEDICINE | Facility: CLINIC | Age: 73
End: 2023-01-18
Payer: MEDICARE

## 2023-01-18 DIAGNOSIS — Z23 NEED FOR VACCINATION: Primary | ICD-10-CM

## 2023-01-18 PROCEDURE — 0124A COVID-19, MRNA, LNP-S, BIVALENT BOOSTER, PF, 30 MCG/0.3 ML DOSE: CPT | Mod: PBBFAC,PO

## 2023-01-18 PROCEDURE — 91312 COVID-19, MRNA, LNP-S, BIVALENT BOOSTER, PF, 30 MCG/0.3 ML DOSE: CPT | Mod: PBBFAC,PO

## 2023-01-22 ENCOUNTER — PATIENT MESSAGE (OUTPATIENT)
Dept: FAMILY MEDICINE | Facility: CLINIC | Age: 73
End: 2023-01-22
Payer: MEDICARE

## 2023-01-24 ENCOUNTER — PATIENT MESSAGE (OUTPATIENT)
Dept: FAMILY MEDICINE | Facility: CLINIC | Age: 73
End: 2023-01-24
Payer: MEDICARE

## 2023-01-24 DIAGNOSIS — E11.42 TYPE 2 DIABETES MELLITUS WITH DIABETIC POLYNEUROPATHY, WITHOUT LONG-TERM CURRENT USE OF INSULIN: Primary | ICD-10-CM

## 2023-01-26 ENCOUNTER — OFFICE VISIT (OUTPATIENT)
Dept: PODIATRY | Facility: CLINIC | Age: 73
End: 2023-01-26
Payer: MEDICARE

## 2023-01-26 VITALS — WEIGHT: 275.81 LBS | HEIGHT: 69 IN | BODY MASS INDEX: 40.85 KG/M2

## 2023-01-26 DIAGNOSIS — E11.42 DIABETIC POLYNEUROPATHY ASSOCIATED WITH TYPE 2 DIABETES MELLITUS: Primary | ICD-10-CM

## 2023-01-26 DIAGNOSIS — E66.01 MORBID OBESITY WITH BMI OF 40.0-44.9, ADULT: ICD-10-CM

## 2023-01-26 DIAGNOSIS — B35.1 ONYCHOMYCOSIS DUE TO DERMATOPHYTE: ICD-10-CM

## 2023-01-26 DIAGNOSIS — M20.41 HAMMER TOES OF BOTH FEET: ICD-10-CM

## 2023-01-26 DIAGNOSIS — M20.42 HAMMER TOES OF BOTH FEET: ICD-10-CM

## 2023-01-26 DIAGNOSIS — Q82.8 POROKERATOSIS: ICD-10-CM

## 2023-01-26 PROCEDURE — 99213 OFFICE O/P EST LOW 20 MIN: CPT | Mod: 25,S$PBB,, | Performed by: PODIATRIST

## 2023-01-26 PROCEDURE — 99999 PR PBB SHADOW E&M-EST. PATIENT-LVL II: ICD-10-PCS | Mod: PBBFAC,,, | Performed by: PODIATRIST

## 2023-01-26 PROCEDURE — 99999 PR PBB SHADOW E&M-EST. PATIENT-LVL II: CPT | Mod: PBBFAC,,, | Performed by: PODIATRIST

## 2023-01-26 PROCEDURE — 99213 PR OFFICE/OUTPT VISIT, EST, LEVL III, 20-29 MIN: ICD-10-PCS | Mod: 25,S$PBB,, | Performed by: PODIATRIST

## 2023-01-26 PROCEDURE — 11056 PARNG/CUTG B9 HYPRKR LES 2-4: CPT | Mod: Q9,S$PBB,, | Performed by: PODIATRIST

## 2023-01-26 PROCEDURE — 11721 DEBRIDE NAIL 6 OR MORE: CPT | Mod: PBBFAC,PN | Performed by: PODIATRIST

## 2023-01-26 PROCEDURE — 11056 PR TRIM BENIGN HYPERKERATOTIC SKIN LESION,2-4: ICD-10-PCS | Mod: Q9,S$PBB,, | Performed by: PODIATRIST

## 2023-01-26 PROCEDURE — 11721 PR DEBRIDEMENT OF NAILS, 6 OR MORE: ICD-10-PCS | Mod: 59,Q9,S$PBB, | Performed by: PODIATRIST

## 2023-01-26 PROCEDURE — 11721 DEBRIDE NAIL 6 OR MORE: CPT | Mod: 59,Q9,S$PBB, | Performed by: PODIATRIST

## 2023-01-26 PROCEDURE — 99212 OFFICE O/P EST SF 10 MIN: CPT | Mod: PBBFAC,PN | Performed by: PODIATRIST

## 2023-01-26 PROCEDURE — 11056 PARNG/CUTG B9 HYPRKR LES 2-4: CPT | Mod: PBBFAC,PN | Performed by: PODIATRIST

## 2023-01-26 NOTE — PROGRESS NOTES
Subjective:      Patient ID: Tal Maldonado is a 72 y.o. male.    Chief Complaint: Diabetes Mellitus and Diabetic Foot Exam ([facility] 11/7/22)      Tal is a 72 y.o. male who presents to the clinic upon referral from Dr. Iglesias  for evaluation and treatment of diabetic feet. Tal has a past medical history of Amblyopia, Arthritis of hand, BPH (benign prostatic hyperplasia), Cataract, Colon polyps, COPD (chronic obstructive pulmonary disease), Coronary artery disease, Diabetes mellitus, Hypertension, WINSOME on CPAP, Other hyperlipidemia, Sensorineural hearing loss (SNHL) of left ear, and Superficial thrombophlebitis. Patient relates  having toenails that are in need of trimming.  Denies being painful with wearing shoe gear.  Has not attempted to treat.  Denies overt neuropathy pain with today's exam.  States his blood glucose readings have been decent.  Denies any additional pedal complaints.    PCP: Marlon Iglesias MD    Date Last Seen by PCP: 11/22    Hemoglobin A1C   Date Value Ref Range Status   09/23/2022 6.8 (H) 4.0 - 5.6 % Final     Comment:     ADA Screening Guidelines:  5.7-6.4%  Consistent with prediabetes  >or=6.5%  Consistent with diabetes    High levels of fetal hemoglobin interfere with the HbA1C  assay. Heterozygous hemoglobin variants (HbS, HgC, etc)do  not significantly interfere with this assay.   However, presence of multiple variants may affect accuracy.     03/17/2022 7.1 (H) 4.0 - 5.6 % Final     Comment:     ADA Screening Guidelines:  5.7-6.4%  Consistent with prediabetes  >or=6.5%  Consistent with diabetes    High levels of fetal hemoglobin interfere with the HbA1C  assay. Heterozygous hemoglobin variants (HbS, HgC, etc)do  not significantly interfere with this assay.   However, presence of multiple variants may affect accuracy.     09/17/2021 6.9 (H) 4.0 - 5.6 % Final     Comment:     ADA Screening Guidelines:  5.7-6.4%  Consistent with prediabetes  >or=6.5%  Consistent with diabetes    High levels  of fetal hemoglobin interfere with the HbA1C  assay. Heterozygous hemoglobin variants (HbS, HgC, etc)do  not significantly interfere with this assay.   However, presence of multiple variants may affect accuracy.             Past Medical History:   Diagnosis Date    Amblyopia     OD    Arthritis of hand     BPH (benign prostatic hyperplasia)     Cataract     Colon polyps     COPD (chronic obstructive pulmonary disease)     Coronary artery disease     Diabetes mellitus     Hypertension     WINSOME on CPAP     Other hyperlipidemia     Sensorineural hearing loss (SNHL) of left ear     Superficial thrombophlebitis        Past Surgical History:   Procedure Laterality Date    APPENDECTOMY      blephroplasty Bilateral 2009    COLONOSCOPY      COLONOSCOPY N/A 3/12/2021    Procedure: COLONOSCOPY;  Surgeon: Dandre Hobbs Jr., MD;  Location: [facility];  Service: Endoscopy;  Laterality: N/A;    DUPUYTREN CONTRACTURE RELEASE Bilateral     HERNIA REPAIR      Umbilical    TONSILLECTOMY         Family History   Problem Relation Age of Onset    Hypertension Mother     Diabetes Mother     Cataracts Mother     Glaucoma Mother         ?    Emphysema Father     Prostate cancer Brother     Macular degeneration Neg Hx     Retinal detachment Neg Hx     Strabismus Neg Hx        Social History     Socioeconomic History    Marital status:    Occupational History    Occupation: [employer]    Tobacco Use    Smoking status: Never    Smokeless tobacco: Never   Substance and Sexual Activity    Alcohol use: Yes     Alcohol/week: 7.0 standard drinks     Types: 7 Glasses of wine per week    Drug use: Never    Sexual activity: Yes     Partners: Female   Social History Narrative    ** Merged History Encounter **          Social Determinants of Health     Financial Resource Strain: Low Risk     Difficulty of Paying Living Expenses: Not hard at all   Food Insecurity: No Food Insecurity    Worried About Running Out of Food in the Last Year:  Never true    Ran Out of Food in the Last Year: Never true   Transportation Needs: No Transportation Needs    Lack of Transportation (Medical): No    Lack of Transportation (Non-Medical): No   Physical Activity: Insufficiently Active    Days of Exercise per Week: 3 days    Minutes of Exercise per Session: 30 min   Stress: No Stress Concern Present    Feeling of Stress : Not at all   Social Connections: Unknown    Frequency of Communication with Friends and Family: More than three times a week    Frequency of Social Gatherings with Friends and Family: Once a week    Active Member of Clubs or Organizations: No    Attends Club or Organization Meetings: Never    Marital Status:    Housing Stability: Low Risk     Unable to Pay for Housing in the Last Year: No    Number of Places Lived in the Last Year: 1    Unstable Housing in the Last Year: No       Current Outpatient Medications   Medication Sig Dispense Refill    allopurinoL (ZYLOPRIM) 100 MG tablet TAKE 2 TABLETS(200 MG) BY MOUTH EVERY  tablet 3    amLODIPine (NORVASC) 10 MG tablet Take 1 tablet (10 mg total) by mouth once daily. 90 tablet 3    atorvastatin (LIPITOR) 40 MG tablet TAKE 1 TABLET BY MOUTH EVERY NIGHT AT BEDTIME 90 tablet 3    calcipotriene-betamethasone 0.005-0.064 % Crea Apply 1 application topically once daily at 6am. 60 g 3    clotrimazole-betamethasone 1-0.05% (LOTRISONE) cream Apply topically 2 (two) times daily.      FARXIGA 5 mg Tab tablet TAKE 1 TABLET BY MOUTH DAILY 90 tablet 3    finasteride (PROSCAR) 5 mg tablet TAKE 1 TABLET(5 MG) BY MOUTH EVERY DAY 90 tablet 3    FREESTYLE LITE STRIPS Strp TEST ONCE DAILY 100 strip 3    glipiZIDE (GLUCOTROL) 10 MG TR24 Take 1 tablet (10 mg total) by mouth daily with breakfast. 90 tablet 3    glucosamine-chondroitin 500-400 mg tablet Take 1 tablet by mouth 3 (three) times daily.      indomethacin (INDOCIN) 50 MG capsule Take 1 capsule (50 mg total) by mouth 2 (two) times daily as needed (gout  "flare). 30 capsule 1    LANCETS MISC by Misc.(Non-Drug; Combo Route) route.      levothyroxine (SYNTHROID) 150 MCG tablet TAKE 1 TABLET(150 MCG) BY MOUTH BEFORE BREAKFAST 90 tablet 2    lisinopriL-hydrochlorothiazide (PRINZIDE,ZESTORETIC) 20-12.5 mg per tablet Take 1 tablet by mouth 2 (two) times a day. 180 tablet 3    metFORMIN (GLUCOPHAGE) 1000 MG tablet TAKE 1 TABLET(1000 MG) BY MOUTH TWICE DAILY WITH MEALS 180 tablet 3    metoprolol succinate (TOPROL-XL) 200 MG 24 hr tablet TAKE 1 TABLET(200 MG) BY MOUTH EVERY DAY 90 tablet 3    omega-3 fatty acids/fish oil (FISH OIL-OMEGA-3 FATTY ACIDS) 300-1,000 mg capsule Take by mouth once daily.      pen needle, diabetic (BD NIC 2ND GEN PEN NEEDLE) 32 gauge x 5/32" Ndle USE ONCE DAILY AS DIRECTED 100 each 3    tamsulosin (FLOMAX) 0.4 mg Cap TAKE 1 CAPSULE BY MOUTH EVERY DAY 30 MINUTES AFTER A MEAL 90 capsule 3    VICTOZA 3-MARIO 0.6 mg/0.1 mL (18 mg/3 mL) PnIj pen ADMINISTER 1.8 MG UNDER THE SKIN EVERY DAY 27 mL 0    aspirin (ECOTRIN) 81 MG EC tablet Take 1 tablet (81 mg total) by mouth once daily.  0     No current facility-administered medications for this visit.       Review of patient's allergies indicates:  No Known Allergies      Review of Systems   Constitutional: Negative for chills and fever.   Cardiovascular:  Positive for leg swelling. Negative for claudication.   Skin:  Positive for color change, nail changes and suspicious lesions.   Musculoskeletal:  Positive for joint swelling. Negative for joint pain, muscle cramps, muscle weakness and myalgias.   Neurological:  Negative for numbness and paresthesias.   Psychiatric/Behavioral:  Negative for altered mental status.          Objective:      Physical Exam  Constitutional:       General: He is not in acute distress.     Appearance: He is well-developed. He is not diaphoretic.   Cardiovascular:      Pulses:           Dorsalis pedis pulses are 2+ on the right side and 2+ on the left side.        Posterior tibial " pulses are 2+ on the right side and 2+ on the left side.      Comments: CFT is < 3 seconds bilateral.  Pedal hair growth is present bilateral.  Mild varicosities noted bilateral.  Moderate non pitting lower extremity edema noted bilateral.  Toes are warm to touch bilateral.    Musculoskeletal:         General: No tenderness.      Comments: Muscle strength 5/5 in all muscle groups bilateral.  No tenderness nor crepitation with ROM of foot/ankle joints bilateral.  No tenderness with palpation of bilateral foot and ankle.  Bilateral pes planus foot type.  Bilateral semi-rigid contracture of toes 2-5.   Skin:     General: Skin is warm and dry.      Capillary Refill: Capillary refill takes 2 to 3 seconds.      Coloration: Skin is not pale.      Findings: Lesion present. No abrasion, bruising, burn, ecchymosis, erythema, laceration or rash.      Comments: Pedal skin appears edematous bilateral.  Toenails x 10 appear thickened by 2 mm elongated by 6 mm, and discolored with subungual debris.  Porokeratoses noted to the Lt. Forefoot (3 lesions) and the Rt. Forefoot (1 lesion).  Rt. Forefoot porokeratoses are centered around the distal 1st intermetatarsal space and Lt. Foot porokeratoses noted to the 3rd intermetatarsal space.  No break noted in adjacent skin integrity.      Neurological:      Mental Status: He is alert and oriented to person, place, and time.      Sensory: Sensory deficit present.      Motor: No weakness or atrophy.      Comments: Protective sensation per [place] monofilament is intact bilateral.  Vibratory sensation is decreased bilateral.  Light touch is intact bilateral.             Assessment:       Encounter Diagnoses   Name Primary?    Diabetic polyneuropathy associated with type 2 diabetes mellitus Yes    Hammer toes of both feet     Onychomycosis due to dermatophyte     Morbid obesity with BMI of 40.0-44.9, adult     Porokeratosis          Plan:       Tal was seen today for diabetes  mellitus and diabetic foot exam.    Diagnoses and all orders for this visit:    Diabetic polyneuropathy associated with type 2 diabetes mellitus    Hammer toes of both feet    Onychomycosis due to dermatophyte    Morbid obesity with BMI of 40.0-44.9, adult    Porokeratosis    I counseled the patient on his conditions, their implications and medical management.    Advised to continue wearing shoe gear that accommodates digital deformities.    Discussed the importance of diet and exercise as they pertain to diabetes management and maintaining a healthy BMI.    Shoe inspection. Diabetic Foot Education. Patient reminded of the importance of good nutrition and blood sugar control to help prevent podiatric complications of diabetes. Patient instructed on proper foot hygeine. We discussed wearing proper shoe gear, daily foot inspections, never walking without protective shoe gear, never putting sharp instruments to feet.    With patient's permission, nails were aggressively reduced and debrided x 10  to their soft tissue attachment mechanically and with electric , removing all offending nail and debris.  With the patient's verbal consent, a sterile #15 scalpel was used to trim lesions x 4 down to smooth appearing skin without incident.  Patient relates relief following the procedure. He will continue to monitor the areas daily, inspect his feet, wear protective shoe gear when ambulatory, moisturizer to maintain skin integrity and follow in this office in approximately 4 months, sooner p.r.n.    Alex Olson DPM

## 2023-01-27 ENCOUNTER — OFFICE VISIT (OUTPATIENT)
Dept: UROLOGY | Facility: CLINIC | Age: 73
End: 2023-01-27
Payer: MEDICARE

## 2023-01-27 VITALS — BODY MASS INDEX: 41.5 KG/M2 | WEIGHT: 280.19 LBS | HEIGHT: 69 IN

## 2023-01-27 DIAGNOSIS — N40.1 ENLARGED PROSTATE WITH URINARY OBSTRUCTION: Primary | ICD-10-CM

## 2023-01-27 DIAGNOSIS — N13.8 ENLARGED PROSTATE WITH URINARY OBSTRUCTION: Primary | ICD-10-CM

## 2023-01-27 LAB
BILIRUBIN, UA POC OHS: NEGATIVE
BLOOD, UA POC OHS: ABNORMAL
CLARITY, UA POC OHS: CLEAR
COLOR, UA POC OHS: YELLOW
GLUCOSE, UA POC OHS: >=1000
KETONES, UA POC OHS: NEGATIVE
LEUKOCYTES, UA POC OHS: NEGATIVE
NITRITE, UA POC OHS: NEGATIVE
PH, UA POC OHS: 5.5
PROTEIN, UA POC OHS: NEGATIVE
SPECIFIC GRAVITY, UA POC OHS: 1.01
UROBILINOGEN, UA POC OHS: 0.2

## 2023-01-27 PROCEDURE — 99214 OFFICE O/P EST MOD 30 MIN: CPT | Mod: S$PBB,,, | Performed by: UROLOGY

## 2023-01-27 PROCEDURE — 81003 URINALYSIS AUTO W/O SCOPE: CPT | Mod: PBBFAC,PO | Performed by: UROLOGY

## 2023-01-27 PROCEDURE — 99213 OFFICE O/P EST LOW 20 MIN: CPT | Mod: PBBFAC,PO | Performed by: UROLOGY

## 2023-01-27 PROCEDURE — 99214 PR OFFICE/OUTPT VISIT, EST, LEVL IV, 30-39 MIN: ICD-10-PCS | Mod: S$PBB,,, | Performed by: UROLOGY

## 2023-01-27 PROCEDURE — 99999 PR PBB SHADOW E&M-EST. PATIENT-LVL III: CPT | Mod: PBBFAC,,, | Performed by: UROLOGY

## 2023-01-27 PROCEDURE — 99999 PR PBB SHADOW E&M-EST. PATIENT-LVL III: ICD-10-PCS | Mod: PBBFAC,,, | Performed by: UROLOGY

## 2023-01-27 NOTE — PROGRESS NOTES
Subjective:       Patient ID: Tal Maldonado is a 72 y.o. male.    Chief Complaint: Annual Exam    HPI    72-year-old with BPH.  He has been maintained on Flomax and Finasteride for greater than 10 years he estimates.  He does have a family history of prostate cancer.  His brother had a radical prostatectomy in the past.  His last PSA was 061.  When he takes his Flomax and finasteride he has no bothersome urinary symptoms.  He denies hematuria and dysuria.  He has no previous urinary tract infections.  He is overall satisfied with his urinary symptoms.    Urine dipstick shows negative for all components except glucose.     Component PSA, Screen   Latest Ref Rng & Units 0.00 - 4.00 ng/mL   9/23/2022 0.61   3/19/2021 0.66   1/27/2020 0.52       Review of Systems   Constitutional:  Negative for fever.   Genitourinary:  Negative for dysuria and hematuria.     Objective:      Physical Exam  Vitals reviewed.   Constitutional:       Appearance: He is well-developed.   HENT:      Head: Normocephalic and atraumatic.   Eyes:      Conjunctiva/sclera: Conjunctivae normal.   Cardiovascular:      Rate and Rhythm: Normal rate.   Pulmonary:      Effort: Pulmonary effort is normal.   Genitourinary:     Prostate: Enlarged (40g, s/s/a). Not tender.      Rectum: No mass. Normal anal tone.   Musculoskeletal:         General: Normal range of motion.   Skin:     General: Skin is warm and dry.      Findings: No rash.   Neurological:      Mental Status: He is alert and oriented to person, place, and time.       Assessment:       1. Enlarged prostate with urinary obstruction          Plan:       Enlarged prostate with urinary obstruction  -     POCT Urinalysis(Instrument)      Continue Flomax and finasteride.  Annual follow-up.

## 2023-01-30 RX ORDER — INSULIN PUMP SYRINGE, 3 ML
EACH MISCELLANEOUS
Qty: 1 EACH | Refills: 0 | Status: SHIPPED | OUTPATIENT
Start: 2023-01-30 | End: 2024-01-30

## 2023-01-30 NOTE — TELEPHONE ENCOUNTER
Can we send a new prescription for a new meter and test strips. His Medicare part B is [facility]

## 2023-01-30 NOTE — TELEPHONE ENCOUNTER
No new care gaps identified.  [facility] Embedded Care Gaps. Reference number: [id number]. 1/30/2023   5:36:05 PM CST

## 2023-01-31 NOTE — TELEPHONE ENCOUNTER
Spoke with [facility] regarding the test strips. They have received the CMN form. The pharmacy tech stated she is calling Medicare to get the strips processed. We did try to get a new meter and strips from the [facility] but is has to be processes the Medicare Part B and the [facility] pharmacy can't do that. I have updated the patient on the most recent information.

## 2023-03-02 DIAGNOSIS — I10 ESSENTIAL HYPERTENSION: ICD-10-CM

## 2023-03-02 RX ORDER — LISINOPRIL AND HYDROCHLOROTHIAZIDE 12.5; 2 MG/1; MG/1
TABLET ORAL
Qty: 180 TABLET | Refills: 1 | Status: SHIPPED | OUTPATIENT
Start: 2023-03-02 | End: 2023-08-23

## 2023-03-02 NOTE — TELEPHONE ENCOUNTER
Refill Routing Note   Medication(s) are not appropriate for processing by [facility] for the following reason(s):       Drug-disease interaction    [facility] action(s):  Defer    Medication Therapy Plan: Acute gout of multiple sites    Appointments  past 12m or future 3m with PCP    Date Provider   Last Visit   11/7/2022 Marlon Iglesias MD   Next Visit   5/8/2023 Marlon Iglesias MD   ED visits in past 90 days: 0        Note composed:8:06 AM 03/02/2023

## 2023-03-02 NOTE — TELEPHONE ENCOUNTER
No new care gaps identified.  [facility] Embedded Care Gaps. Reference number: [id number]. 3/02/2023   7:09:02 AM CST

## 2023-03-02 NOTE — TELEPHONE ENCOUNTER
Refill Decision Note   Tal Maldonado  is requesting a refill authorization.  Brief Assessment and Rationale for Refill:  Approve     Medication Therapy Plan:       Medication Reconciliation Completed: No   Comments:     No Care Gaps recommended.     Note composed:8:18 AM 03/02/2023

## 2023-05-01 ENCOUNTER — LAB VISIT (OUTPATIENT)
Dept: LAB | Facility: HOSPITAL | Age: 73
End: 2023-05-01
Attending: INTERNAL MEDICINE
Payer: MEDICARE

## 2023-05-01 DIAGNOSIS — E78.49 OTHER HYPERLIPIDEMIA: ICD-10-CM

## 2023-05-01 DIAGNOSIS — I10 ESSENTIAL HYPERTENSION: ICD-10-CM

## 2023-05-01 DIAGNOSIS — M10.49 OTHER SECONDARY ACUTE GOUT OF MULTIPLE SITES: ICD-10-CM

## 2023-05-01 DIAGNOSIS — E11.42 TYPE 2 DIABETES MELLITUS WITH DIABETIC POLYNEUROPATHY, WITHOUT LONG-TERM CURRENT USE OF INSULIN: ICD-10-CM

## 2023-05-01 DIAGNOSIS — E03.4 HYPOTHYROIDISM DUE TO ACQUIRED ATROPHY OF THYROID: ICD-10-CM

## 2023-05-01 LAB
ALBUMIN SERPL BCP-MCNC: 4.2 G/DL (ref 3.5–5.2)
ALP SERPL-CCNC: 67 U/L (ref 55–135)
ALT SERPL W/O P-5'-P-CCNC: 41 U/L (ref 10–44)
ANION GAP SERPL CALC-SCNC: 13 MMOL/L (ref 8–16)
AST SERPL-CCNC: 26 U/L (ref 10–40)
BASOPHILS # BLD AUTO: 0.04 K/UL (ref 0–0.2)
BASOPHILS NFR BLD: 0.7 % (ref 0–1.9)
BILIRUB SERPL-MCNC: 0.9 MG/DL (ref 0.1–1)
BUN SERPL-MCNC: 14 MG/DL (ref 8–23)
CALCIUM SERPL-MCNC: 9.9 MG/DL (ref 8.7–10.5)
CHLORIDE SERPL-SCNC: 98 MMOL/L (ref 95–110)
CHOLEST SERPL-MCNC: 163 MG/DL (ref 120–199)
CHOLEST/HDLC SERPL: 4.4 {RATIO} (ref 2–5)
CO2 SERPL-SCNC: 30 MMOL/L (ref 23–29)
CREAT SERPL-MCNC: 0.9 MG/DL (ref 0.5–1.4)
DIFFERENTIAL METHOD: ABNORMAL
EOSINOPHIL # BLD AUTO: 0.1 K/UL (ref 0–0.5)
EOSINOPHIL NFR BLD: 2.2 % (ref 0–8)
ERYTHROCYTE [DISTWIDTH] IN BLOOD BY AUTOMATED COUNT: 12.6 % (ref 11.5–14.5)
EST. GFR  (NO RACE VARIABLE): >60 ML/MIN/1.73 M^2
ESTIMATED AVG GLUCOSE: 148 MG/DL (ref 68–131)
GLUCOSE SERPL-MCNC: 172 MG/DL (ref 70–110)
HBA1C MFR BLD: 6.8 % (ref 4–5.6)
HCT VFR BLD AUTO: 51.1 % (ref 40–54)
HDLC SERPL-MCNC: 37 MG/DL (ref 40–75)
HDLC SERPL: 22.7 % (ref 20–50)
HGB BLD-MCNC: 16.9 G/DL (ref 14–18)
IMM GRANULOCYTES # BLD AUTO: 0.04 K/UL (ref 0–0.04)
IMM GRANULOCYTES NFR BLD AUTO: 0.7 % (ref 0–0.5)
LDLC SERPL CALC-MCNC: 89.2 MG/DL (ref 63–159)
LYMPHOCYTES # BLD AUTO: 2.1 K/UL (ref 1–4.8)
LYMPHOCYTES NFR BLD: 34.2 % (ref 18–48)
MCH RBC QN AUTO: 31.1 PG (ref 27–31)
MCHC RBC AUTO-ENTMCNC: 33.1 G/DL (ref 32–36)
MCV RBC AUTO: 94 FL (ref 82–98)
MONOCYTES # BLD AUTO: 0.6 K/UL (ref 0.3–1)
MONOCYTES NFR BLD: 9.8 % (ref 4–15)
NEUTROPHILS # BLD AUTO: 3.2 K/UL (ref 1.8–7.7)
NEUTROPHILS NFR BLD: 52.4 % (ref 38–73)
NONHDLC SERPL-MCNC: 126 MG/DL
NRBC BLD-RTO: 0 /100 WBC
PLATELET # BLD AUTO: 126 K/UL (ref 150–450)
PMV BLD AUTO: 10.6 FL (ref 9.2–12.9)
POTASSIUM SERPL-SCNC: 3.7 MMOL/L (ref 3.5–5.1)
PROT SERPL-MCNC: 6.8 G/DL (ref 6–8.4)
RBC # BLD AUTO: 5.43 M/UL (ref 4.6–6.2)
SODIUM SERPL-SCNC: 141 MMOL/L (ref 136–145)
T4 FREE SERPL-MCNC: 1.07 NG/DL (ref 0.71–1.51)
TRIGL SERPL-MCNC: 184 MG/DL (ref 30–150)
TSH SERPL DL<=0.005 MIU/L-ACNC: 3.97 UIU/ML (ref 0.4–4)
URATE SERPL-MCNC: 5.2 MG/DL (ref 3.4–7)
WBC # BLD AUTO: 6.03 K/UL (ref 3.9–12.7)

## 2023-05-01 PROCEDURE — 36415 COLL VENOUS BLD VENIPUNCTURE: CPT | Mod: PO | Performed by: INTERNAL MEDICINE

## 2023-05-01 PROCEDURE — 84439 ASSAY OF FREE THYROXINE: CPT | Performed by: INTERNAL MEDICINE

## 2023-05-01 PROCEDURE — 80053 COMPREHEN METABOLIC PANEL: CPT | Performed by: INTERNAL MEDICINE

## 2023-05-01 PROCEDURE — 84443 ASSAY THYROID STIM HORMONE: CPT | Performed by: INTERNAL MEDICINE

## 2023-05-01 PROCEDURE — 83036 HEMOGLOBIN GLYCOSYLATED A1C: CPT | Performed by: INTERNAL MEDICINE

## 2023-05-01 PROCEDURE — 80061 LIPID PANEL: CPT | Performed by: INTERNAL MEDICINE

## 2023-05-01 PROCEDURE — 84550 ASSAY OF BLOOD/URIC ACID: CPT | Performed by: INTERNAL MEDICINE

## 2023-05-01 PROCEDURE — 85025 COMPLETE CBC W/AUTO DIFF WBC: CPT | Performed by: INTERNAL MEDICINE

## 2023-05-04 RX ORDER — METOPROLOL SUCCINATE 200 MG/1
TABLET, EXTENDED RELEASE ORAL
Qty: 90 TABLET | Refills: 1 | Status: SHIPPED | OUTPATIENT
Start: 2023-05-04 | End: 2023-10-31

## 2023-05-04 NOTE — TELEPHONE ENCOUNTER
Refill Routing Note   Medication(s) are not appropriate for processing by [facility] for the following reason(s):      Drug-disease interaction    [facility] action(s):  Defer     Medication Therapy Plan: ABNORMAL RESULTS OF LIVER FUNCTION STUDIES [Level: Severe Warning]    Pharmacist review requested: Yes     Appointments  past 12m or future 3m with PCP    Date Provider   Last Visit   11/7/2022 Marlon Iglesias MD   Next Visit   5/8/2023 Marlon Iglesias MD   ED visits in past 90 days: 0        Note composed:7:29 AM 05/04/2023

## 2023-05-04 NOTE — TELEPHONE ENCOUNTER
Refill Decision Note   Tal Maldonado  is requesting a refill authorization.  Brief Assessment and Rationale for Refill:  Approve     Medication Therapy Plan:         Alert overridden per protocol: Yes   Pharmacist review requested: Yes   Comments:     No Care Gaps recommended.     Note composed:1:51 PM 05/04/2023

## 2023-05-04 NOTE — TELEPHONE ENCOUNTER
No care due was identified.  [facility] Embedded Care Due Messages. Reference number: [id number].   5/04/2023 7:06:40 AM CDT

## 2023-05-08 ENCOUNTER — OFFICE VISIT (OUTPATIENT)
Dept: FAMILY MEDICINE | Facility: CLINIC | Age: 73
End: 2023-05-08
Payer: MEDICARE

## 2023-05-08 VITALS
HEIGHT: 69 IN | SYSTOLIC BLOOD PRESSURE: 145 MMHG | HEART RATE: 76 BPM | OXYGEN SATURATION: 95 % | BODY MASS INDEX: 41.21 KG/M2 | WEIGHT: 278.25 LBS | DIASTOLIC BLOOD PRESSURE: 75 MMHG

## 2023-05-08 DIAGNOSIS — I10 ESSENTIAL HYPERTENSION: ICD-10-CM

## 2023-05-08 DIAGNOSIS — M10.49 OTHER SECONDARY ACUTE GOUT OF MULTIPLE SITES: ICD-10-CM

## 2023-05-08 DIAGNOSIS — E03.4 HYPOTHYROIDISM DUE TO ACQUIRED ATROPHY OF THYROID: ICD-10-CM

## 2023-05-08 DIAGNOSIS — E11.42 TYPE 2 DIABETES MELLITUS WITH DIABETIC POLYNEUROPATHY, WITHOUT LONG-TERM CURRENT USE OF INSULIN: ICD-10-CM

## 2023-05-08 DIAGNOSIS — E78.2 MIXED HYPERLIPIDEMIA: Primary | ICD-10-CM

## 2023-05-08 DIAGNOSIS — D69.6 THROMBOCYTOPENIA: ICD-10-CM

## 2023-05-08 DIAGNOSIS — E66.01 MORBID OBESITY WITH BMI OF 40.0-44.9, ADULT: ICD-10-CM

## 2023-05-08 DIAGNOSIS — N40.0 BENIGN PROSTATIC HYPERPLASIA WITHOUT LOWER URINARY TRACT SYMPTOMS: ICD-10-CM

## 2023-05-08 DIAGNOSIS — G47.33 OSA ON CPAP: ICD-10-CM

## 2023-05-08 PROCEDURE — 99999 PR PBB SHADOW E&M-EST. PATIENT-LVL IV: CPT | Mod: PBBFAC,,, | Performed by: INTERNAL MEDICINE

## 2023-05-08 PROCEDURE — 99999 PR PBB SHADOW E&M-EST. PATIENT-LVL IV: ICD-10-PCS | Mod: PBBFAC,,, | Performed by: INTERNAL MEDICINE

## 2023-05-08 PROCEDURE — 99214 PR OFFICE/OUTPT VISIT, EST, LEVL IV, 30-39 MIN: ICD-10-PCS | Mod: S$PBB,,, | Performed by: INTERNAL MEDICINE

## 2023-05-08 PROCEDURE — 99214 OFFICE O/P EST MOD 30 MIN: CPT | Mod: PBBFAC,PO | Performed by: INTERNAL MEDICINE

## 2023-05-08 PROCEDURE — 99214 OFFICE O/P EST MOD 30 MIN: CPT | Mod: S$PBB,,, | Performed by: INTERNAL MEDICINE

## 2023-05-08 RX ORDER — SEMAGLUTIDE 0.68 MG/ML
0.25 INJECTION, SOLUTION SUBCUTANEOUS
Qty: 3 ML | Refills: 3 | Status: SHIPPED | OUTPATIENT
Start: 2023-05-08 | End: 2023-05-08 | Stop reason: SDUPTHER

## 2023-05-08 RX ORDER — ATORVASTATIN CALCIUM 80 MG/1
80 TABLET, FILM COATED ORAL NIGHTLY
Qty: 90 TABLET | Refills: 3 | Status: SHIPPED | OUTPATIENT
Start: 2023-05-08

## 2023-05-08 NOTE — PROGRESS NOTES
"Patient ID: Tal Maldonado     Chief Complaint:   Chief Complaint   Patient presents with    Follow-up     6 month         HPI:  Routine follow-up for labs and his diabetes is well controlled at 6.8, but his LDL is higher than I like an 89.  I will increase atorvastatin to 80 mg at night.  If he starts having issues with muscle or joint pain we will consider switching to rosuvastatin.  Blood pressure is slightly high today but he swears that is very well controlled at home, so I would like him to send me a copy of those blood pressures in a week.  Other labs are very good save for his chronic mild thrombocytopenia.  I would like to switch his Victoza to Ozempic it possible due to its beneficial effect on controlling diabetes as well as weight loss.    Review of Systems       Negative     Objective:      Physical Exam   Physical Exam       Obese     Vitals:   Vitals:    05/08/23 0837 05/08/23 0851   BP: (!) 148/64 (!) 145/75   Pulse: 76    SpO2: 95%    Weight: 126.2 kg (278 lb 3.5 oz)    Height: 5' 9" (1.753 m)           Current Outpatient Medications:     allopurinoL (ZYLOPRIM) 100 MG tablet, TAKE 2 TABLETS(200 MG) BY MOUTH EVERY DAY, Disp: 180 tablet, Rfl: 3    amLODIPine (NORVASC) 10 MG tablet, Take 1 tablet (10 mg total) by mouth once daily., Disp: 90 tablet, Rfl: 3    blood sugar diagnostic Strp, To check BG 2 times daily, to use with insurance preferred meter, Disp: 100 each, Rfl: 3    blood-glucose meter kit, To check BG 2 times daily, to use with insurance preferred meter, Disp: 1 each, Rfl: 0    calcipotriene-betamethasone 0.005-0.064 % Crea, Apply 1 application topically once daily at 6am., Disp: 60 g, Rfl: 3    clotrimazole-betamethasone 1-0.05% (LOTRISONE) cream, Apply topically 2 (two) times daily., Disp: , Rfl:     FARXIGA 5 mg Tab tablet, TAKE 1 TABLET BY MOUTH DAILY, Disp: 90 tablet, Rfl: 3    finasteride (PROSCAR) 5 mg tablet, TAKE 1 TABLET(5 MG) BY MOUTH EVERY DAY, Disp: 90 tablet, Rfl: 3    " "FREESTYLE LITE STRIPS Strp, TEST ONCE DAILY, Disp: 100 strip, Rfl: 3    glipiZIDE (GLUCOTROL) 10 MG TR24, Take 1 tablet (10 mg total) by mouth daily with breakfast., Disp: 90 tablet, Rfl: 3    glucosamine-chondroitin 500-400 mg tablet, Take 1 tablet by mouth 3 (three) times daily., Disp: , Rfl:     indomethacin (INDOCIN) 50 MG capsule, Take 1 capsule (50 mg total) by mouth 2 (two) times daily as needed (gout flare)., Disp: 30 capsule, Rfl: 1    LANCETS MISC, by Misc.(Non-Drug; Combo Route) route., Disp: , Rfl:     levothyroxine (SYNTHROID) 150 MCG tablet, TAKE 1 TABLET(150 MCG) BY MOUTH BEFORE BREAKFAST, Disp: 90 tablet, Rfl: 3    lisinopriL-hydrochlorothiazide (PRINZIDE,ZESTORETIC) 20-12.5 mg per tablet, Take 1 tablet by mouth 2 (two) times a day., Disp: 180 tablet, Rfl: 1    metFORMIN (GLUCOPHAGE) 1000 MG tablet, TAKE 1 TABLET(1000 MG) BY MOUTH TWICE DAILY WITH MEALS, Disp: 180 tablet, Rfl: 3    metoprolol succinate (TOPROL-XL) 200 MG 24 hr tablet, TAKE 1 TABLET(200 MG) BY MOUTH EVERY DAY, Disp: 90 tablet, Rfl: 1    omega-3 fatty acids/fish oil (FISH OIL-OMEGA-3 FATTY ACIDS) 300-1,000 mg capsule, Take by mouth once daily., Disp: , Rfl:     pen needle, diabetic (BD NIC 2ND GEN PEN NEEDLE) 32 gauge x 5/32" Ndle, USE ONCE DAILY AS DIRECTED, Disp: 100 each, Rfl: 3    tamsulosin (FLOMAX) 0.4 mg Cap, Take 1 capsule (0.4 mg total) by mouth once daily., Disp: 90 capsule, Rfl: 3    aspirin (ECOTRIN) 81 MG EC tablet, Take 1 tablet (81 mg total) by mouth once daily., Disp: , Rfl: 0    atorvastatin (LIPITOR) 80 MG tablet, Take 1 tablet (80 mg total) by mouth every evening., Disp: 90 tablet, Rfl: 3    semaglutide (OZEMPIC) 0.25 mg or 0.5 mg (2 mg/3 mL) pen injector, Inject 0.25 mg into the skin every 7 days., Disp: 3 mL, Rfl: 3   Assessment:       Patient Active Problem List    Diagnosis Date Noted    Psoriasis 11/07/2022    Diabetic polyneuropathy associated with type 2 diabetes mellitus 03/24/2022    Hypothyroidism due to " acquired atrophy of thyroid 09/24/2021    Colon cancer screening 03/12/2021    Thrombocytopenia 09/23/2020    Elevated liver enzymes 09/23/2020    Abnormal results of liver function studies  09/23/2020    Essential hypertension 09/23/2020    Adenomatous polyp of colon 09/23/2020    Morbid obesity with BMI of 40.0-44.9, adult 06/08/2020    Hammer toes of both feet 06/08/2020    Acute gout of multiple sites 06/04/2020    Benign prostatic hyperplasia without lower urinary tract symptoms     Diabetes mellitus     Mixed hyperlipidemia     Sensorineural hearing loss (SNHL) of left ear     WINSOME on CPAP     Colon polyps     Superficial thrombophlebitis     Arthritis of hand           Plan:       Tal Maldonado  was seen today for follow-up and may need lab work.    Diagnoses and all orders for this visit:    Tal was seen today for follow-up.    Diagnoses and all orders for this visit:    Mixed hyperlipidemia  -     atorvastatin (LIPITOR) 80 MG tablet; Take 1 tablet (80 mg total) by mouth every evening.  Uncontrolled - increasing Atorvastatin     Type 2 diabetes mellitus with diabetic polyneuropathy, without long-term current use of insulin  -     atorvastatin (LIPITOR) 80 MG tablet; Take 1 tablet (80 mg total) by mouth every evening.  -     semaglutide (OZEMPIC) 0.25 mg or 0.5 mg (2 mg/3 mL) pen injector; Inject 0.25 mg into the skin every 7 days.  Controlled with med     Essential hypertension  High here - he will send me a copy of home Blood Pressures    Benign prostatic hyperplasia without lower urinary tract symptoms  Controlled     Thrombocytopenia  Mild and stable and chronic    Hypothyroidism due to acquired atrophy of thyroid  Controlled with med     Morbid obesity with BMI of 40.0-44.9, adult  Monitor    WINSOME on CPAP  Controlled    Other secondary acute gout of multiple sites  Controlled

## 2023-05-08 NOTE — TELEPHONE ENCOUNTER
----- Message from Arnold Jasso sent at 5/8/2023  9:05 AM CDT -----  Regarding: med question  Type:  Pharmacy Calling to Clarify an RX    Name of Caller:  otis    Pharmacy Name:    [id number] - [place] [address] [facility] [address]  [address] [address] [address]  Phone: [phone] Fax: [phone]    Prescription Name:    semaglutide (OZEMPIC) 0.25 mg or 0.5 mg (2 mg/3 mL) pen injector 3 mL 3 5/8/2023    Sig - Route: Inject 0.25 mg into the skin every 7 days. - Subcutaneous   Sent to pharmacy as: semaglutide (OZEMPIC) 0.25 mg or 0.5 mg (2 mg/3 mL) pen injector   E-Prescribing Status: Receipt confirmed by pharmacy (5/8/2023  8:49 AM CDT)     What do they need to clarify?:  SIG    Best Call Back Number:  [phone]     Additional Information:

## 2023-05-09 RX ORDER — SEMAGLUTIDE 0.68 MG/ML
0.25 INJECTION, SOLUTION SUBCUTANEOUS
Qty: 3 ML | Refills: 3 | Status: SHIPPED | OUTPATIENT
Start: 2023-05-09 | End: 2023-11-06 | Stop reason: DRUGHIGH

## 2023-05-11 ENCOUNTER — PATIENT MESSAGE (OUTPATIENT)
Dept: FAMILY MEDICINE | Facility: CLINIC | Age: 73
End: 2023-05-11
Payer: MEDICARE

## 2023-05-11 NOTE — TELEPHONE ENCOUNTER
Spoke with pharmacy, they are wanting to know confirm the dose on the ozempic and see how long Dr. Iglesias wants him taking it for because if patient continuous on the 0.25 mg he will have extra doses of the medication and will need pen needles to complete the extra medication.   [facility] was wanting an order for the pen needles. Gave verbal order for pen needles.

## 2023-05-15 ENCOUNTER — PATIENT MESSAGE (OUTPATIENT)
Dept: FAMILY MEDICINE | Facility: CLINIC | Age: 73
End: 2023-05-15
Payer: MEDICARE

## 2023-05-16 VITALS — SYSTOLIC BLOOD PRESSURE: 120 MMHG | DIASTOLIC BLOOD PRESSURE: 70 MMHG

## 2023-05-27 DIAGNOSIS — M10.49 OTHER SECONDARY ACUTE GOUT OF MULTIPLE SITES: ICD-10-CM

## 2023-05-27 NOTE — TELEPHONE ENCOUNTER
No care due was identified.  [facility] Embedded Care Due Messages. Reference number: [id number].   5/27/2023 7:32:39 AM CDT

## 2023-05-28 RX ORDER — ALLOPURINOL 100 MG/1
TABLET ORAL
Qty: 180 TABLET | Refills: 3 | Status: SHIPPED | OUTPATIENT
Start: 2023-05-28

## 2023-05-28 NOTE — TELEPHONE ENCOUNTER
Refill Decision Note   Tal Maldonado  is requesting a refill authorization.  Brief Assessment and Rationale for Refill:  Approve     Medication Therapy Plan:       Medication Reconciliation Completed: No   Comments:     No Care Gaps recommended.     Note composed:3:13 AM 05/28/2023

## 2023-05-30 ENCOUNTER — OFFICE VISIT (OUTPATIENT)
Dept: PODIATRY | Facility: CLINIC | Age: 73
End: 2023-05-30
Payer: MEDICARE

## 2023-05-30 VITALS — WEIGHT: 278.25 LBS | HEIGHT: 69 IN | BODY MASS INDEX: 41.21 KG/M2

## 2023-05-30 DIAGNOSIS — M20.41 HAMMER TOES OF BOTH FEET: ICD-10-CM

## 2023-05-30 DIAGNOSIS — E66.01 MORBID OBESITY WITH BMI OF 40.0-44.9, ADULT: ICD-10-CM

## 2023-05-30 DIAGNOSIS — B35.1 ONYCHOMYCOSIS DUE TO DERMATOPHYTE: ICD-10-CM

## 2023-05-30 DIAGNOSIS — M20.42 HAMMER TOES OF BOTH FEET: ICD-10-CM

## 2023-05-30 DIAGNOSIS — E11.42 DIABETIC POLYNEUROPATHY ASSOCIATED WITH TYPE 2 DIABETES MELLITUS: Primary | ICD-10-CM

## 2023-05-30 PROCEDURE — 99999 PR PBB SHADOW E&M-EST. PATIENT-LVL II: CPT | Mod: PBBFAC,,, | Performed by: PODIATRIST

## 2023-05-30 PROCEDURE — 99999 PR PBB SHADOW E&M-EST. PATIENT-LVL II: ICD-10-PCS | Mod: PBBFAC,,, | Performed by: PODIATRIST

## 2023-05-30 PROCEDURE — 99212 OFFICE O/P EST SF 10 MIN: CPT | Mod: PBBFAC,PN | Performed by: PODIATRIST

## 2023-05-30 PROCEDURE — 11721 PR DEBRIDEMENT OF NAILS, 6 OR MORE: ICD-10-PCS | Mod: Q9,S$PBB,, | Performed by: PODIATRIST

## 2023-05-30 PROCEDURE — 11721 DEBRIDE NAIL 6 OR MORE: CPT | Mod: Q9,S$PBB,, | Performed by: PODIATRIST

## 2023-05-30 PROCEDURE — 99499 NO LOS: ICD-10-PCS | Mod: S$PBB,,, | Performed by: PODIATRIST

## 2023-05-30 PROCEDURE — 11721 DEBRIDE NAIL 6 OR MORE: CPT | Mod: PBBFAC,PN | Performed by: PODIATRIST

## 2023-05-30 PROCEDURE — 99499 UNLISTED E&M SERVICE: CPT | Mod: S$PBB,,, | Performed by: PODIATRIST

## 2023-05-30 NOTE — PROGRESS NOTES
Subjective:      Patient ID: Tal Maldonado is a 72 y.o. male.    Chief Complaint: Diabetes Mellitus and Diabetic Foot Exam (Harris 5/23)      Tal is a 72 y.o. male who presents to the clinic upon referral from Dr. Iglesias  for evaluation and treatment of diabetic feet. Tal has a past medical history of Amblyopia, Arthritis of hand, BPH (benign prostatic hyperplasia), Cataract, Colon polyps, COPD (chronic obstructive pulmonary disease), Coronary artery disease, Diabetes mellitus, Hypertension, WINSOME on CPAP, Other hyperlipidemia, Sensorineural hearing loss (SNHL) of left ear, and Superficial thrombophlebitis. Patient relates  having toenails that are in need of trimming.  Denies being painful with wearing shoe gear.  Has not attempted to treat.  Denies overt neuropathy pain with today's exam.  Continues tight management of his blood glucose.  Denies any additional pedal complaints.    PCP: Marlon Iglesias MD    Date Last Seen by PCP: 5/23    Hemoglobin A1C   Date Value Ref Range Status   05/01/2023 6.8 (H) 4.0 - 5.6 % Final     Comment:     ADA Screening Guidelines:  5.7-6.4%  Consistent with prediabetes  >or=6.5%  Consistent with diabetes    High levels of fetal hemoglobin interfere with the HbA1C  assay. Heterozygous hemoglobin variants (HbS, HgC, etc)do  not significantly interfere with this assay.   However, presence of multiple variants may affect accuracy.     09/23/2022 6.8 (H) 4.0 - 5.6 % Final     Comment:     ADA Screening Guidelines:  5.7-6.4%  Consistent with prediabetes  >or=6.5%  Consistent with diabetes    High levels of fetal hemoglobin interfere with the HbA1C  assay. Heterozygous hemoglobin variants (HbS, HgC, etc)do  not significantly interfere with this assay.   However, presence of multiple variants may affect accuracy.     03/17/2022 7.1 (H) 4.0 - 5.6 % Final     Comment:     ADA Screening Guidelines:  5.7-6.4%  Consistent with prediabetes  >or=6.5%  Consistent with diabetes    High levels of  fetal hemoglobin interfere with the HbA1C  assay. Heterozygous hemoglobin variants (HbS, HgC, etc)do  not significantly interfere with this assay.   However, presence of multiple variants may affect accuracy.             Past Medical History:   Diagnosis Date    Amblyopia     OD    Arthritis of hand     BPH (benign prostatic hyperplasia)     Cataract     Colon polyps     COPD (chronic obstructive pulmonary disease)     Coronary artery disease     Diabetes mellitus     Hypertension     WINSOME on CPAP     Other hyperlipidemia     Sensorineural hearing loss (SNHL) of left ear     Superficial thrombophlebitis        Past Surgical History:   Procedure Laterality Date    APPENDECTOMY      blephroplasty Bilateral 2009    COLONOSCOPY      COLONOSCOPY N/A 3/12/2021    Procedure: COLONOSCOPY;  Surgeon: Dandre Hobbs Jr., MD;  [facility]: [facility];  Service: Endoscopy;  Laterality: N/A;    DUPUYTREN CONTRACTURE RELEASE Bilateral     HERNIA REPAIR      Umbilical    TONSILLECTOMY         Family History   Problem Relation Age of Onset    Hypertension Mother     Diabetes Mother     Cataracts Mother     Glaucoma Mother         ?    Emphysema Father     Prostate cancer Brother     Macular degeneration Neg Hx     Retinal detachment Neg Hx     Strabismus Neg Hx        Social History     Socioeconomic History    Marital status:    Occupational History    Occupation: [employer] Dist[employer]    Tobacco Use    Smoking status: Never    Smokeless tobacco: Never   Substance and Sexual Activity    Alcohol use: Yes     Alcohol/week: 7.0 standard drinks     Types: 7 Glasses of wine per week    Drug use: Never    Sexual activity: Yes     Partners: Female   Social History Narrative    ** Merged History Encounter **          Social Determinants of Health     Financial Resource Strain: Low Risk     Difficulty of Paying Living Expenses: Not hard at all   Food Insecurity: No Food Insecurity    Worried About Running Out of Food in the Last Year: Never  true    Ran Out of Food in the Last Year: Never true   Transportation Needs: No Transportation Needs    Lack of Transportation (Medical): No    Lack of Transportation (Non-Medical): No   Physical Activity: Insufficiently Active    Days of Exercise per Week: 3 days    Minutes of Exercise per Session: 30 min   Stress: No Stress Concern Present    Feeling of Stress : Not at all   Social Connections: Unknown    Frequency of Communication with Friends and Family: More than three times a week    Frequency of Social Gatherings with Friends and Family: Once a week    Active Member of Clubs or Organizations: No    Attends Club or Organization Meetings: Never    Marital Status:    Housing Stability: Low Risk     Unable to Pay for Housing in the Last Year: No    Number of Places Lived in the Last Year: 1    Unstable Housing in the Last Year: No       Current Outpatient Medications   Medication Sig Dispense Refill    allopurinoL (ZYLOPRIM) 100 MG tablet TAKE 2 TABLETS(200 MG) BY MOUTH EVERY  tablet 3    amLODIPine (NORVASC) 10 MG tablet Take 1 tablet (10 mg total) by mouth once daily. 90 tablet 3    atorvastatin (LIPITOR) 80 MG tablet Take 1 tablet (80 mg total) by mouth every evening. 90 tablet 3    blood sugar diagnostic Strp To check BG 2 times daily, to use with insurance preferred meter 100 each 3    blood-glucose meter kit To check BG 2 times daily, to use with insurance preferred meter 1 each 0    calcipotriene-betamethasone 0.005-0.064 % Crea Apply 1 application topically once daily at 6am. 60 g 3    clotrimazole-betamethasone 1-0.05% (LOTRISONE) cream Apply topically 2 (two) times daily.      FARXIGA 5 mg Tab tablet TAKE 1 TABLET BY MOUTH DAILY 90 tablet 3    finasteride (PROSCAR) 5 mg tablet TAKE 1 TABLET(5 MG) BY MOUTH EVERY DAY 90 tablet 3    FREESTYLE LITE STRIPS Strp TEST ONCE DAILY 100 strip 3    glipiZIDE (GLUCOTROL) 10 MG TR24 Take 1 tablet (10 mg total) by mouth daily with breakfast. 90 tablet 3  "   glucosamine-chondroitin 500-400 mg tablet Take 1 tablet by mouth 3 (three) times daily.      indomethacin (INDOCIN) 50 MG capsule Take 1 capsule (50 mg total) by mouth 2 (two) times daily as needed (gout flare). 30 capsule 1    LANCETS MISC by Misc.(Non-Drug; Combo Route) route.      levothyroxine (SYNTHROID) 150 MCG tablet TAKE 1 TABLET(150 MCG) BY MOUTH BEFORE BREAKFAST 90 tablet 3    lisinopriL-hydrochlorothiazide (PRINZIDE,ZESTORETIC) 20-12.5 mg per tablet Take 1 tablet by mouth 2 (two) times a day. 180 tablet 1    metFORMIN (GLUCOPHAGE) 1000 MG tablet TAKE 1 TABLET(1000 MG) BY MOUTH TWICE DAILY WITH MEALS 180 tablet 3    metoprolol succinate (TOPROL-XL) 200 MG 24 hr tablet TAKE 1 TABLET(200 MG) BY MOUTH EVERY DAY 90 tablet 1    omega-3 fatty acids/fish oil (FISH OIL-OMEGA-3 FATTY ACIDS) 300-1,000 mg capsule Take by mouth once daily.      pen needle, diabetic (BD NIC 2ND GEN PEN NEEDLE) 32 gauge x 5/32" Ndle USE ONCE DAILY AS DIRECTED 100 each 3    semaglutide (OZEMPIC) 0.25 mg or 0.5 mg (2 mg/3 mL) pen injector Inject 0.25 mg into the skin every 7 days. 3 mL 3    tamsulosin (FLOMAX) 0.4 mg Cap Take 1 capsule (0.4 mg total) by mouth once daily. 90 capsule 3    aspirin (ECOTRIN) 81 MG EC tablet Take 1 tablet (81 mg total) by mouth once daily.  0     No current facility-administered medications for this visit.       Review of patient's allergies indicates:  No Known Allergies      Review of Systems   Constitutional: Negative for chills and fever.   Cardiovascular:  Positive for leg swelling. Negative for claudication.   Skin:  Positive for color change, nail changes and suspicious lesions.   Musculoskeletal:  Positive for joint swelling. Negative for joint pain, muscle cramps, muscle weakness and myalgias.   Neurological:  Negative for numbness and paresthesias.   Psychiatric/Behavioral:  Negative for altered mental status.          Objective:      Physical Exam  Constitutional:       General: He is not in " acute distress.     Appearance: He is well-developed. He is not diaphoretic.   Cardiovascular:      Pulses:           Dorsalis pedis pulses are 2+ on the right side and 2+ on the left side.        Posterior tibial pulses are 2+ on the right side and 2+ on the left side.      Comments: CFT is < 3 seconds bilateral.  Pedal hair growth is present bilateral.  Mild varicosities noted bilateral.  Moderate non pitting lower extremity edema noted bilateral.  Toes are warm to touch bilateral.    Musculoskeletal:         General: No tenderness.      Comments: Muscle strength 5/5 in all muscle groups bilateral.  No tenderness nor crepitation with ROM of foot/ankle joints bilateral.  No tenderness with palpation of bilateral foot and ankle.  Bilateral pes planus foot type.  Bilateral semi-rigid contracture of toes 2-5.   Skin:     General: Skin is warm and dry.      Capillary Refill: Capillary refill takes 2 to 3 seconds.      Coloration: Skin is not pale.      Findings: No abrasion, bruising, burn, ecchymosis, erythema, laceration, lesion or rash.      Comments: Pedal skin appears edematous bilateral.  Toenails x 10 appear thickened by 2 mm elongated by 6 mm, and discolored with subungual debris.     Neurological:      Mental Status: He is alert and oriented to person, place, and time.      Sensory: Sensory deficit present.      Motor: No weakness or atrophy.      Comments: Protective sensation per [place] monofilament is intact bilateral.  Vibratory sensation is decreased bilateral.  Light touch is intact bilateral.             Assessment:       Encounter Diagnoses   Name Primary?    Diabetic polyneuropathy associated with type 2 diabetes mellitus Yes    Hammer toes of both feet     Onychomycosis due to dermatophyte     Morbid obesity with BMI of 40.0-44.9, adult          Plan:       Tal was seen today for diabetes mellitus and diabetic foot exam.    Diagnoses and all orders for this visit:    Diabetic  polyneuropathy associated with type 2 diabetes mellitus    Hammer toes of both feet    Onychomycosis due to dermatophyte    Morbid obesity with BMI of 40.0-44.9, adult      I counseled the patient on his conditions, their implications and medical management.    Advised to continue wearing shoe gear that accommodates digital deformities.    Discussed the importance of diet and exercise as they pertain to diabetes management and maintaining a healthy BMI.    Shoe inspection. Diabetic Foot Education. Patient reminded of the importance of good nutrition and blood sugar control to help prevent podiatric complications of diabetes. Patient instructed on proper foot hygeine. We discussed wearing proper shoe gear, daily foot inspections, never walking without protective shoe gear, never putting sharp instruments to feet.    With patient's permission, nails were aggressively reduced and debrided x 10  to their soft tissue attachment mechanically and with electric , removing all offending nail and debris.  Patient relates relief following the procedure. He will continue to monitor the areas daily, inspect his feet, wear protective shoe gear when ambulatory, moisturizer to maintain skin integrity and follow in this office in approximately 4 months, sooner p.r.n.    Alex Olson DPM

## 2023-06-03 DIAGNOSIS — Z71.89 COMPLEX CARE COORDINATION: ICD-10-CM

## 2023-06-06 ENCOUNTER — TELEPHONE (OUTPATIENT)
Dept: FAMILY MEDICINE | Facility: CLINIC | Age: 73
End: 2023-06-06
Payer: MEDICARE

## 2023-06-06 NOTE — TELEPHONE ENCOUNTER
Spoke is BRAND[facility] in regards to Mr. Parada. [employer] informed me that they will fax over some paperwork for WINSOME CPAP supplies for us to complete.

## 2023-06-06 NOTE — TELEPHONE ENCOUNTER
----- Message from Michelle Payton sent at 6/5/2023  4:26 PM CDT -----  Contact: Js  Type: Needs Medical Advice  Who Called:  Js/[facility] [employer] [facility]    Best Call Back Number: [phone]    Additional Information: States he is calling to check status of faxed sent over for pt cpap supplies.Please call back reference # [id number]

## 2023-06-12 ENCOUNTER — TELEPHONE (OUTPATIENT)
Dept: FAMILY MEDICINE | Facility: CLINIC | Age: 73
End: 2023-06-12
Payer: MEDICARE

## 2023-06-12 NOTE — TELEPHONE ENCOUNTER
----- Message from Newton Parada sent at 6/12/2023  3:41 PM CDT -----  Type:  Call Back         Who called:Nel with [facility]         What is the request in detail: regarding  requesting CPAP  prescription to be fax to [phone]         Can the clinic reply by [facility]?         Would the patient rather a call back or a response via [facility]? Call back          Best call back number: [phone]         Additional Information:           Thank You

## 2023-06-15 ENCOUNTER — PATIENT MESSAGE (OUTPATIENT)
Dept: FAMILY MEDICINE | Facility: CLINIC | Age: 73
End: 2023-06-15
Payer: MEDICARE

## 2023-06-15 NOTE — TELEPHONE ENCOUNTER
Patient states that [facility] for this.  Verified fax number.

## 2023-07-05 RX ORDER — DAPAGLIFLOZIN 5 MG/1
TABLET, FILM COATED ORAL
Qty: 90 TABLET | Refills: 1 | Status: SHIPPED | OUTPATIENT
Start: 2023-07-05 | End: 2024-01-02

## 2023-07-05 NOTE — TELEPHONE ENCOUNTER
No care due was identified.  [facility] Embedded Care Due Messages. Reference number: [id number].   7/05/2023 8:13:57 AM CDT

## 2023-07-05 NOTE — TELEPHONE ENCOUNTER
Refill Decision Note   Tal Joel  is requesting a refill authorization.  Brief Assessment and Rationale for Refill:  Approve     Medication Therapy Plan:         Comments:     Note composed:10:34 AM 07/05/2023

## 2023-08-21 ENCOUNTER — PATIENT MESSAGE (OUTPATIENT)
Dept: FAMILY MEDICINE | Facility: CLINIC | Age: 73
End: 2023-08-21
Payer: MEDICARE

## 2023-08-21 DIAGNOSIS — E11.42 TYPE 2 DIABETES MELLITUS WITH DIABETIC POLYNEUROPATHY, WITHOUT LONG-TERM CURRENT USE OF INSULIN: Primary | ICD-10-CM

## 2023-08-22 ENCOUNTER — PATIENT MESSAGE (OUTPATIENT)
Dept: FAMILY MEDICINE | Facility: CLINIC | Age: 73
End: 2023-08-22
Payer: MEDICARE

## 2023-08-22 DIAGNOSIS — L40.9 PSORIASIS: Primary | ICD-10-CM

## 2023-08-22 RX ORDER — LANCETS
1 EACH MISCELLANEOUS 2 TIMES DAILY
Qty: 200 EACH | Refills: 11 | Status: SHIPPED | OUTPATIENT
Start: 2023-08-22

## 2023-08-22 NOTE — TELEPHONE ENCOUNTER
Care Due:                  Date            Visit Type   Department     Provider  --------------------------------------------------------------------------------                                EP -                              [facility]  Last Visit: 05-      CARE ([facility])   WAYNE Iglesias                               -                              [facility]  Next Visit: 11-      CARE ([facility])   MEDICINE       Marlon Iglesias                                                            Last  Test          Frequency    Reason                     Performed    Due Date  --------------------------------------------------------------------------------    HBA1C.......  6 months...  FARXIGA, glipiZIDE,        05-   10-                             metFORMIN, semaglutide...    [employer] Embedded Care Due Messages. Reference number: [id number].   8/21/2023 9:31:21 PM CDT

## 2023-08-23 DIAGNOSIS — I10 ESSENTIAL HYPERTENSION: ICD-10-CM

## 2023-08-23 RX ORDER — LISINOPRIL AND HYDROCHLOROTHIAZIDE 12.5; 2 MG/1; MG/1
TABLET ORAL
Qty: 180 TABLET | Refills: 2 | Status: SHIPPED | OUTPATIENT
Start: 2023-08-23

## 2023-08-23 NOTE — TELEPHONE ENCOUNTER
Refill Routing Note   Medication(s) are not appropriate for processing by [facility] for the following reason(s):      Drug-disease interaction    [facility] action(s):  Defer Care Due:  None identified     Medication Therapy Plan: lisinopriL-hydrochlorothiazide and Abnormal results of liver function studies    Pharmacist review requested: Yes     Appointments  past 12m or future 3m with PCP    Date Provider   Last Visit   5/8/2023 Marlon Iglesias MD   Next Visit   11/6/2023 Marlon Iglesias MD   ED visits in past 90 days: 0        Note composed:8:14 AM 08/23/2023

## 2023-08-23 NOTE — TELEPHONE ENCOUNTER
Refill Decision Note      Refill Decision Note   Tal Maldonado  is requesting a refill authorization.  Brief Assessment and Rationale for Refill:  Approve     Medication Therapy Plan:         Pharmacist review requested: Yes   Extended chart review required: Yes   Comments:     Note composed:11:58 AM 08/23/2023             Appointments     Last Visit   5/8/2023 Marlon Iglesias MD   Next Visit   11/6/2023 Marlon Iglesias MD

## 2023-08-23 NOTE — TELEPHONE ENCOUNTER
No care due was identified.  [facility] Embedded Care Due Messages. Reference number: [id number].   8/23/2023 7:52:48 AM CDT

## 2023-08-24 RX ORDER — TRIAMCINOLONE ACETONIDE 1 MG/G
CREAM TOPICAL 2 TIMES DAILY
Qty: 453.6 G | Refills: 0 | Status: SHIPPED | OUTPATIENT
Start: 2023-08-24

## 2023-09-23 ENCOUNTER — IMMUNIZATION (OUTPATIENT)
Dept: FAMILY MEDICINE | Facility: CLINIC | Age: 73
End: 2023-09-23
Payer: MEDICARE

## 2023-09-23 PROCEDURE — 99999PBSHW FLU VACCINE - QUADRIVALENT - ADJUVANTED: ICD-10-PCS | Mod: PBBFAC,,,

## 2023-09-23 PROCEDURE — G0008 ADMIN INFLUENZA VIRUS VAC: HCPCS | Mod: PBBFAC,PO

## 2023-09-23 PROCEDURE — 99999PBSHW FLU VACCINE - QUADRIVALENT - ADJUVANTED: Mod: PBBFAC,,,

## 2023-10-05 DIAGNOSIS — I10 ESSENTIAL HYPERTENSION: ICD-10-CM

## 2023-10-05 RX ORDER — AMLODIPINE BESYLATE 10 MG/1
10 TABLET ORAL
Qty: 90 TABLET | Refills: 2 | Status: SHIPPED | OUTPATIENT
Start: 2023-10-05

## 2023-10-05 NOTE — TELEPHONE ENCOUNTER
Refill Decision Note   Tal Maldonado  is requesting a refill authorization.  Brief Assessment and Rationale for Refill:  Approve     Medication Therapy Plan:         Pharmacist review requested: Yes   Extended chart review required: Yes   Comments:     Note composed:11:52 AM 10/05/2023

## 2023-10-05 NOTE — TELEPHONE ENCOUNTER
Refill Routing Note   Medication(s) are not appropriate for processing by [facility] for the following reason(s):      Drug-disease interaction    [facility] action(s):  Defer Care Due:  None identified     Medication Therapy Plan: Drug-Disease: amLODIPine and Abnormal results of liver function studies      Pharmacist review requested: Yes     Appointments  past 12m or future 3m with PCP    Date Provider   Last Visit   5/8/2023 Marlon Iglesias MD   Next Visit   11/6/2023 Marlon Iglesias MD   ED visits in past 90 days: 0        Note composed:10:21 AM 10/05/2023

## 2023-10-05 NOTE — TELEPHONE ENCOUNTER
No care due was identified.  [facility] Embedded Care Due Messages. Reference number: [id number].   10/05/2023 8:40:26 AM CDT

## 2023-10-31 RX ORDER — METOPROLOL SUCCINATE 200 MG/1
TABLET, EXTENDED RELEASE ORAL
Qty: 90 TABLET | Refills: 1 | Status: SHIPPED | OUTPATIENT
Start: 2023-10-31

## 2023-10-31 NOTE — TELEPHONE ENCOUNTER
Refill Routing Note   Medication(s) are not appropriate for processing by [facility] for the following reason(s):      Drug-disease interaction    [facility] action(s):  Defer Care Due:  None identified     Medication Therapy Plan: FLOS; Drug-Disease: metoprolol succinate and Abnormal results of liver function studies    Pharmacist review requested: Yes     Appointments  past 12m or future 3m with PCP    Date Provider   Last Visit   5/8/2023 Marlon Iglesias MD   Next Visit   11/6/2023 Marlon Iglesias MD   ED visits in past 90 days: 0        Note composed:12:04 PM 10/31/2023

## 2023-10-31 NOTE — TELEPHONE ENCOUNTER
Care Due:                  Date            Visit Type   Department     Provider  --------------------------------------------------------------------------------                                EP -                              [facility]  Last Visit: 05-      CARE ([facility])   WAYNE Iglesias                               -                              [facility]  Next Visit: 11-      CARE ([facility])   MEDICINE       Marlon Iglesias                                                            Last  Test          Frequency    Reason                     Performed    Due Date  --------------------------------------------------------------------------------    HBA1C.......  6 months...  FARXIGA, glipiZIDE,        05-   10-                             metFORMIN, semaglutide...    [employer] Embedded Care Due Messages. Reference number: [id number].   10/31/2023 8:16:13 AM CDT

## 2023-10-31 NOTE — TELEPHONE ENCOUNTER
Refill Decision Note   Tal Maldonado  is requesting a refill authorization.  Brief Assessment and Rationale for Refill:  Approve     Medication Therapy Plan:  FLOS; LFTs WNL    Medication Reconciliation Completed: No   Comments:     No Care Gaps recommended.     Note composed:12:17 PM 10/31/2023

## 2023-11-03 ENCOUNTER — LAB VISIT (OUTPATIENT)
Dept: LAB | Facility: HOSPITAL | Age: 73
End: 2023-11-03
Attending: INTERNAL MEDICINE
Payer: MEDICARE

## 2023-11-03 DIAGNOSIS — E78.2 MIXED HYPERLIPIDEMIA: ICD-10-CM

## 2023-11-03 DIAGNOSIS — D69.6 THROMBOCYTOPENIA: ICD-10-CM

## 2023-11-03 DIAGNOSIS — E11.42 TYPE 2 DIABETES MELLITUS WITH DIABETIC POLYNEUROPATHY, WITHOUT LONG-TERM CURRENT USE OF INSULIN: ICD-10-CM

## 2023-11-03 LAB
BASOPHILS # BLD AUTO: 0.05 K/UL (ref 0–0.2)
BASOPHILS NFR BLD: 0.8 % (ref 0–1.9)
CHOLEST SERPL-MCNC: 146 MG/DL (ref 120–199)
CHOLEST/HDLC SERPL: 4.1 {RATIO} (ref 2–5)
DIFFERENTIAL METHOD: ABNORMAL
EOSINOPHIL # BLD AUTO: 0.1 K/UL (ref 0–0.5)
EOSINOPHIL NFR BLD: 1.9 % (ref 0–8)
ERYTHROCYTE [DISTWIDTH] IN BLOOD BY AUTOMATED COUNT: 13 % (ref 11.5–14.5)
HCT VFR BLD AUTO: 48.7 % (ref 40–54)
HDLC SERPL-MCNC: 36 MG/DL (ref 40–75)
HDLC SERPL: 24.7 % (ref 20–50)
HGB BLD-MCNC: 16.3 G/DL (ref 14–18)
IMM GRANULOCYTES # BLD AUTO: 0.03 K/UL (ref 0–0.04)
IMM GRANULOCYTES NFR BLD AUTO: 0.5 % (ref 0–0.5)
LDLC SERPL CALC-MCNC: 74.2 MG/DL (ref 63–159)
LYMPHOCYTES # BLD AUTO: 1.5 K/UL (ref 1–4.8)
LYMPHOCYTES NFR BLD: 23.8 % (ref 18–48)
MCH RBC QN AUTO: 31.4 PG (ref 27–31)
MCHC RBC AUTO-ENTMCNC: 33.5 G/DL (ref 32–36)
MCV RBC AUTO: 94 FL (ref 82–98)
MONOCYTES # BLD AUTO: 0.6 K/UL (ref 0.3–1)
MONOCYTES NFR BLD: 8.9 % (ref 4–15)
NEUTROPHILS # BLD AUTO: 4.1 K/UL (ref 1.8–7.7)
NEUTROPHILS NFR BLD: 64.1 % (ref 38–73)
NONHDLC SERPL-MCNC: 110 MG/DL
NRBC BLD-RTO: 0 /100 WBC
PLATELET # BLD AUTO: 136 K/UL (ref 150–450)
PMV BLD AUTO: 11.1 FL (ref 9.2–12.9)
RBC # BLD AUTO: 5.19 M/UL (ref 4.6–6.2)
TRIGL SERPL-MCNC: 179 MG/DL (ref 30–150)
WBC # BLD AUTO: 6.39 K/UL (ref 3.9–12.7)

## 2023-11-03 PROCEDURE — 36415 COLL VENOUS BLD VENIPUNCTURE: CPT | Mod: PO | Performed by: INTERNAL MEDICINE

## 2023-11-03 PROCEDURE — 85025 COMPLETE CBC W/AUTO DIFF WBC: CPT | Performed by: INTERNAL MEDICINE

## 2023-11-03 PROCEDURE — 80061 LIPID PANEL: CPT | Performed by: INTERNAL MEDICINE

## 2023-11-03 PROCEDURE — 83036 HEMOGLOBIN GLYCOSYLATED A1C: CPT | Performed by: INTERNAL MEDICINE

## 2023-11-04 LAB
ESTIMATED AVG GLUCOSE: 157 MG/DL (ref 68–131)
HBA1C MFR BLD: 7.1 % (ref 4–5.6)

## 2023-11-06 ENCOUNTER — OFFICE VISIT (OUTPATIENT)
Dept: FAMILY MEDICINE | Facility: CLINIC | Age: 73
End: 2023-11-06
Payer: MEDICARE

## 2023-11-06 ENCOUNTER — LAB VISIT (OUTPATIENT)
Dept: LAB | Facility: HOSPITAL | Age: 73
End: 2023-11-06
Attending: INTERNAL MEDICINE
Payer: MEDICARE

## 2023-11-06 VITALS
HEART RATE: 75 BPM | SYSTOLIC BLOOD PRESSURE: 134 MMHG | BODY MASS INDEX: 40.94 KG/M2 | OXYGEN SATURATION: 97 % | HEIGHT: 69 IN | WEIGHT: 276.44 LBS | DIASTOLIC BLOOD PRESSURE: 72 MMHG

## 2023-11-06 DIAGNOSIS — I10 ESSENTIAL HYPERTENSION: ICD-10-CM

## 2023-11-06 DIAGNOSIS — N40.0 BENIGN PROSTATIC HYPERPLASIA WITHOUT LOWER URINARY TRACT SYMPTOMS: ICD-10-CM

## 2023-11-06 DIAGNOSIS — D69.6 THROMBOCYTOPENIA: ICD-10-CM

## 2023-11-06 DIAGNOSIS — E66.01 MORBID OBESITY WITH BMI OF 40.0-44.9, ADULT: ICD-10-CM

## 2023-11-06 DIAGNOSIS — G47.33 OSA ON CPAP: ICD-10-CM

## 2023-11-06 DIAGNOSIS — Z00.00 WELLNESS EXAMINATION: Primary | ICD-10-CM

## 2023-11-06 DIAGNOSIS — E11.42 TYPE 2 DIABETES MELLITUS WITH DIABETIC POLYNEUROPATHY, WITHOUT LONG-TERM CURRENT USE OF INSULIN: ICD-10-CM

## 2023-11-06 DIAGNOSIS — E78.2 MIXED HYPERLIPIDEMIA: ICD-10-CM

## 2023-11-06 DIAGNOSIS — M10.49 OTHER SECONDARY ACUTE GOUT OF MULTIPLE SITES: ICD-10-CM

## 2023-11-06 DIAGNOSIS — E03.4 HYPOTHYROIDISM DUE TO ACQUIRED ATROPHY OF THYROID: ICD-10-CM

## 2023-11-06 PROBLEM — R74.8 ELEVATED LIVER ENZYMES: Status: RESOLVED | Noted: 2020-09-23 | Resolved: 2023-11-06

## 2023-11-06 PROBLEM — R94.5 ABNORMAL RESULTS OF LIVER FUNCTION STUDIES: Status: RESOLVED | Noted: 2020-09-23 | Resolved: 2023-11-06

## 2023-11-06 LAB
ALBUMIN/CREAT UR: 35.6 UG/MG (ref 0–30)
CREAT UR-MCNC: 45 MG/DL (ref 23–375)
MICROALBUMIN UR DL<=1MG/L-MCNC: 16 UG/ML

## 2023-11-06 PROCEDURE — 99999 PR PBB SHADOW E&M-EST. PATIENT-LVL IV: CPT | Mod: PBBFAC,,, | Performed by: INTERNAL MEDICINE

## 2023-11-06 PROCEDURE — 99214 OFFICE O/P EST MOD 30 MIN: CPT | Mod: S$PBB,,, | Performed by: INTERNAL MEDICINE

## 2023-11-06 PROCEDURE — 82570 ASSAY OF URINE CREATININE: CPT | Performed by: INTERNAL MEDICINE

## 2023-11-06 PROCEDURE — 99214 PR OFFICE/OUTPT VISIT, EST, LEVL IV, 30-39 MIN: ICD-10-PCS | Mod: S$PBB,,, | Performed by: INTERNAL MEDICINE

## 2023-11-06 PROCEDURE — 99999 PR PBB SHADOW E&M-EST. PATIENT-LVL IV: ICD-10-PCS | Mod: PBBFAC,,, | Performed by: INTERNAL MEDICINE

## 2023-11-06 PROCEDURE — 99214 OFFICE O/P EST MOD 30 MIN: CPT | Mod: PBBFAC,PO | Performed by: INTERNAL MEDICINE

## 2023-11-06 RX ORDER — SEMAGLUTIDE 0.68 MG/ML
0.5 INJECTION, SOLUTION SUBCUTANEOUS
Qty: 3 ML | Refills: 3 | Status: SHIPPED | OUTPATIENT
Start: 2023-11-06 | End: 2024-03-22 | Stop reason: SDUPTHER

## 2023-11-06 NOTE — PROGRESS NOTES
"[facility]  Primary Care   [address]       Patient ID: Tal Maldonado     Chief Complaint:   Chief Complaint   Patient presents with    Follow-up     6 month for hyperlipidemia          HPI:  Annual exam and overall he is doing very well.  He is tolerating the increased dose of atorvastatin and taking it at night and his LDL cholesterol is almost at goal.  He is noticing that his morning sugars in the 130s despite adding Ozempic 0.25 mg per week so he would like an increase to the 0.5 mg per week and I think that is fair.  We will continue his other diabetic medications.  His A1c is slightly higher at 7.1 but I assured him that overall it is well-controlled.  He does need a diabetic eye exam which will be coming up shortly and he will get a PSA from his urologist here and I will get that report.  He does need a urine microalbumin tested and we will get that today.  Vital signs look very good.  He is up-to-date on all his immunizations.  We will reconvene in 6 months with labs just before then.    Review of Systems       Negative    Objective:      Physical Exam   Physical Exam       Obese     Vitals:   Vitals:    11/06/23 0926   BP: 134/72   Pulse: 75   SpO2: 97%   Weight: 125.4 kg (276 lb 7.3 oz)   Height: 5' 9" (1.753 m)        Assessment:           Plan:       Tal Maldonado  was seen today for follow-up and may need lab work.    Diagnoses and all orders for this visit:    Tal was seen today for follow-up.    Diagnoses and all orders for this visit:    Wellness examination    Type 2 diabetes mellitus with diabetic polyneuropathy, without long-term current use of insulin  -     semaglutide (OZEMPIC) 0.25 mg or 0.5 mg (2 mg/3 mL) pen injector; Inject 0.5 mg into the skin every 7 days.  -     Microalbumin/Creatinine Ratio, Urine; Future  -     Hemoglobin A1C; Future  Overall Controlled with med     Essential hypertension  Controlled with med     Mixed hyperlipidemia  -     Lipid " Panel; Future  -     Comprehensive Metabolic Panel; Future  LDL almost at goal      Benign prostatic hyperplasia without lower urinary tract symptoms  Per Urology   PSA soon     Thrombocytopenia  -     CBC Auto Differential; Future  Chronic and stable     Hypothyroidism due to acquired atrophy of thyroid  -     TSH; Future  Monitor TSH     Morbid obesity with BMI of 40.0-44.9, adult  Monitor     Other secondary acute gout of multiple sites  -     Uric Acid; Future  Controlled with med     WINSOME on CPAP  Compliant with CPAP and getting good benefit          Marlon Iglesias MD

## 2023-11-27 DIAGNOSIS — E11.42 TYPE 2 DIABETES MELLITUS WITH DIABETIC POLYNEUROPATHY, WITHOUT LONG-TERM CURRENT USE OF INSULIN: Primary | ICD-10-CM

## 2023-11-27 RX ORDER — GLIPIZIDE 10 MG/1
10 TABLET, FILM COATED, EXTENDED RELEASE ORAL
Qty: 90 TABLET | Refills: 0 | Status: SHIPPED | OUTPATIENT
Start: 2023-11-27 | End: 2024-03-02

## 2023-11-27 NOTE — TELEPHONE ENCOUNTER
Refill Decision Note   Tal Joel  is requesting a refill authorization.  Brief Assessment and Rationale for Refill:  Approve     Medication Therapy Plan:         Comments:     Note composed:4:31 PM 11/27/2023

## 2023-11-27 NOTE — TELEPHONE ENCOUNTER
No care due was identified.  [facility] Embedded Care Due Messages. Reference number: [id number].   11/27/2023 7:23:58 AM CST

## 2023-12-20 ENCOUNTER — TELEPHONE (OUTPATIENT)
Dept: PODIATRY | Facility: CLINIC | Age: 73
End: 2023-12-20
Payer: MEDICARE

## 2024-01-01 ENCOUNTER — PATIENT MESSAGE (OUTPATIENT)
Dept: FAMILY MEDICINE | Facility: CLINIC | Age: 74
End: 2024-01-01
Payer: MEDICARE

## 2024-01-01 DIAGNOSIS — E11.42 TYPE 2 DIABETES MELLITUS WITH DIABETIC POLYNEUROPATHY, WITHOUT LONG-TERM CURRENT USE OF INSULIN: Primary | ICD-10-CM

## 2024-01-01 DIAGNOSIS — E11.42 TYPE 2 DIABETES MELLITUS WITH DIABETIC POLYNEUROPATHY, WITHOUT LONG-TERM CURRENT USE OF INSULIN: ICD-10-CM

## 2024-01-01 RX ORDER — SEMAGLUTIDE 0.68 MG/ML
0.5 INJECTION, SOLUTION SUBCUTANEOUS
Qty: 3 ML | Refills: 3 | OUTPATIENT
Start: 2024-01-01

## 2024-01-01 NOTE — TELEPHONE ENCOUNTER
No care due was identified.  [facility] Embedded Care Due Messages. Reference number: [id number].   1/01/2024 8:55:47 AM CST

## 2024-01-02 RX ORDER — DAPAGLIFLOZIN 5 MG/1
5 TABLET, FILM COATED ORAL DAILY
Qty: 90 TABLET | Refills: 0 | Status: SHIPPED | OUTPATIENT
Start: 2024-01-02

## 2024-01-02 NOTE — TELEPHONE ENCOUNTER
Refill Decision Note   Tal Joel  is requesting a refill authorization.  Brief Assessment and Rationale for Refill:  Approve     Medication Therapy Plan:         Comments:     Note composed:4:21 PM 01/02/2024

## 2024-01-03 DIAGNOSIS — Z71.89 COMPLEX CARE COORDINATION: ICD-10-CM

## 2024-01-03 RX ORDER — FINASTERIDE 5 MG/1
TABLET, FILM COATED ORAL
Qty: 90 TABLET | Refills: 3 | Status: SHIPPED | OUTPATIENT
Start: 2024-01-03 | End: 2024-01-29 | Stop reason: SDUPTHER

## 2024-01-19 ENCOUNTER — OFFICE VISIT (OUTPATIENT)
Dept: PODIATRY | Facility: CLINIC | Age: 74
End: 2024-01-19
Payer: MEDICARE

## 2024-01-19 VITALS — HEIGHT: 69 IN | BODY MASS INDEX: 40.88 KG/M2 | WEIGHT: 276 LBS

## 2024-01-19 DIAGNOSIS — E66.01 MORBID OBESITY WITH BMI OF 40.0-44.9, ADULT: ICD-10-CM

## 2024-01-19 DIAGNOSIS — E11.42 DIABETIC POLYNEUROPATHY ASSOCIATED WITH TYPE 2 DIABETES MELLITUS: Primary | ICD-10-CM

## 2024-01-19 DIAGNOSIS — B35.1 ONYCHOMYCOSIS DUE TO DERMATOPHYTE: ICD-10-CM

## 2024-01-19 DIAGNOSIS — M20.41 HAMMER TOES OF BOTH FEET: ICD-10-CM

## 2024-01-19 DIAGNOSIS — M20.42 HAMMER TOES OF BOTH FEET: ICD-10-CM

## 2024-01-19 PROCEDURE — 11721 DEBRIDE NAIL 6 OR MORE: CPT | Mod: PBBFAC,PO | Performed by: PODIATRIST

## 2024-01-19 PROCEDURE — 99999 PR PBB SHADOW E&M-EST. PATIENT-LVL III: CPT | Mod: PBBFAC,,, | Performed by: PODIATRIST

## 2024-01-19 PROCEDURE — 11721 DEBRIDE NAIL 6 OR MORE: CPT | Mod: Q9,S$PBB,, | Performed by: PODIATRIST

## 2024-01-19 PROCEDURE — 99213 OFFICE O/P EST LOW 20 MIN: CPT | Mod: PBBFAC,PO | Performed by: PODIATRIST

## 2024-01-19 PROCEDURE — 99213 OFFICE O/P EST LOW 20 MIN: CPT | Mod: 25,S$PBB,, | Performed by: PODIATRIST

## 2024-01-19 NOTE — PROGRESS NOTES
Subjective:      Patient ID: Tal Maldonado is a 73 y.o. male.    Chief Complaint: Diabetes Mellitus      Tal is a 73 y.o. male who presents to the clinic for evaluation and treatment of diabetic feet. Tal has a past medical history of Abnormal results of liver function studies  (9/23/2020), Amblyopia, Arthritis of hand, BPH (benign prostatic hyperplasia), Cataract, Colon polyps, COPD (chronic obstructive pulmonary disease), Coronary artery disease, Diabetes mellitus, Elevated liver enzymes (9/23/2020), Hypertension, WINSOME on CPAP, Other hyperlipidemia, Sensorineural hearing loss (SNHL) of left ear, Superficial thrombophlebitis, and Superficial thrombophlebitis. Patient relates having toenails that are in need of trimming.  Denies being painful with wearing shoe gear.  Has not attempted to treat. Relates decent control over his blood glucose.  Denies lower extremity paresthesias with today's exam.  Denies any additional pedal complaints.    PCP: Marlon Iglesias MD    Date Last Seen by PCP: 11/23    Hemoglobin A1C   Date Value Ref Range Status   11/03/2023 7.1 (H) 4.0 - 5.6 % Final     Comment:     ADA Screening Guidelines:  5.7-6.4%  Consistent with prediabetes  >or=6.5%  Consistent with diabetes    High levels of fetal hemoglobin interfere with the HbA1C  assay. Heterozygous hemoglobin variants (HbS, HgC, etc)do  not significantly interfere with this assay.   However, presence of multiple variants may affect accuracy.     05/01/2023 6.8 (H) 4.0 - 5.6 % Final     Comment:     ADA Screening Guidelines:  5.7-6.4%  Consistent with prediabetes  >or=6.5%  Consistent with diabetes    High levels of fetal hemoglobin interfere with the HbA1C  assay. Heterozygous hemoglobin variants (HbS, HgC, etc)do  not significantly interfere with this assay.   However, presence of multiple variants may affect accuracy.     09/23/2022 6.8 (H) 4.0 - 5.6 % Final     Comment:     ADA Screening Guidelines:  5.7-6.4%  Consistent with  prediabetes  >or=6.5%  Consistent with diabetes    High levels of fetal hemoglobin interfere with the HbA1C  assay. Heterozygous hemoglobin variants (HbS, HgC, etc)do  not significantly interfere with this assay.   However, presence of multiple variants may affect accuracy.             Past Medical History:   Diagnosis Date    Abnormal results of liver function studies  9/23/2020    Amblyopia     OD    Arthritis of hand     BPH (benign prostatic hyperplasia)     Cataract     Colon polyps     COPD (chronic obstructive pulmonary disease)     Coronary artery disease     Diabetes mellitus     Elevated liver enzymes 9/23/2020    Hypertension     WINSOME on CPAP     Other hyperlipidemia     Sensorineural hearing loss (SNHL) of left ear     Superficial thrombophlebitis     Superficial thrombophlebitis        Past Surgical History:   Procedure Laterality Date    APPENDECTOMY      blephroplasty Bilateral 2009    COLONOSCOPY      COLONOSCOPY N/A 3/12/2021    Procedure: COLONOSCOPY;  Surgeon: Dandre Hobbs Jr., MD;  Location: [facility];  Service: Endoscopy;  Laterality: N/A;    DUPUYTREN CONTRACTURE RELEASE Bilateral     HERNIA REPAIR      Umbilical    TONSILLECTOMY         Family History   Problem Relation Age of Onset    Hypertension Mother     Diabetes Mother     Cataracts Mother     Glaucoma Mother         ?    Emphysema Father     Prostate cancer Brother     Macular degeneration Neg Hx     Retinal detachment Neg Hx     Strabismus Neg Hx        Social History     Socioeconomic History    Marital status:    Occupational History    Occupation: [employer] Dist[employer]    Tobacco Use    Smoking status: Never    Smokeless tobacco: Never   Substance and Sexual Activity    Alcohol use: Yes     Alcohol/week: 7.0 standard drinks of alcohol     Types: 7 Glasses of wine per week    Drug use: Never    Sexual activity: Yes     Partners: Female   Social History Narrative    ** Merged History Encounter **          Social Determinants of  Health     Financial Resource Strain: Low Risk  (11/6/2023)    Overall Financial Resource Strain (CARDIA)     Difficulty of Paying Living Expenses: Not hard at all   Food Insecurity: No Food Insecurity (11/6/2023)    Hunger Vital Sign     Worried About Running Out of Food in the Last Year: Never true     Ran Out of Food in the Last Year: Never true   Transportation Needs: No Transportation Needs (11/6/2023)    PRAPARE - Transportation     Lack of Transportation (Medical): No     Lack of Transportation (Non-Medical): No   Physical Activity: Insufficiently Active (11/6/2023)    Exercise Vital Sign     Days of Exercise per Week: 3 days     Minutes of Exercise per Session: 30 min   Stress: No Stress Concern Present (11/6/2023)    [employer] [place] [employer] - Occupational Stress Questionnaire     Feeling of Stress : Not at all   Social Connections: Unknown (11/6/2023)    Social Connection and Isolation Panel [NHANES]     Frequency of Communication with Friends and Family: Once a week     Frequency of Social Gatherings with Friends and Family: Once a week     Active Member of Clubs or Organizations: No     Attends Club or Organization Meetings: Never     Marital Status:    Housing Stability: Low Risk  (11/7/2022)    Housing Stability Vital Sign     Unable to Pay for Housing in the Last Year: No     Number of Places Lived in the Last Year: 1     Unstable Housing in the Last Year: No       Current Outpatient Medications   Medication Sig Dispense Refill    allopurinoL (ZYLOPRIM) 100 MG tablet TAKE 2 TABLETS(200 MG) BY MOUTH EVERY  tablet 3    amLODIPine (NORVASC) 10 MG tablet TAKE 1 TABLET(10 MG) BY MOUTH EVERY DAY 90 tablet 2    atorvastatin (LIPITOR) 80 MG tablet Take 1 tablet (80 mg total) by mouth every evening. 90 tablet 3    blood sugar diagnostic Strp To check BG 2 times daily, to use with insurance preferred meter 100 each 3    blood-glucose meter kit To check BG 2 times daily, to use with  "insurance preferred meter 1 each 0    calcipotriene-betamethasone 0.005-0.064 % Crea Apply 1 application topically once daily at 6am. 60 g 3    clotrimazole-betamethasone 1-0.05% (LOTRISONE) cream Apply topically 2 (two) times daily.      dapagliflozin propanediol (FARXIGA) 5 mg Tab tablet Take 1 tablet (5 mg total) by mouth once daily. 90 tablet 0    finasteride (PROSCAR) 5 mg tablet TAKE 1 TABLET(5 MG) BY MOUTH EVERY DAY 90 tablet 3    FREESTYLE LITE STRIPS Strp TEST ONCE DAILY 100 strip 3    glipiZIDE (GLUCOTROL) 10 MG TR24 Take 1 tablet (10 mg total) by mouth daily with breakfast. 90 tablet 0    glucosamine-chondroitin 500-400 mg tablet Take 1 tablet by mouth 3 (three) times daily.      indomethacin (INDOCIN) 50 MG capsule Take 1 capsule (50 mg total) by mouth 2 (two) times daily as needed (gout flare). 30 capsule 1    lancets (ACCU-CHEK FASTCLIX LANCET DRUM) Misc Inject 1 Units into the skin 2 (two) times a day. 200 each 11    LANCETS MISC by Misc.(Non-Drug; Combo Route) route.      levothyroxine (SYNTHROID) 150 MCG tablet TAKE 1 TABLET(150 MCG) BY MOUTH BEFORE BREAKFAST 90 tablet 3    lisinopriL-hydrochlorothiazide (PRINZIDE,ZESTORETIC) 20-12.5 mg per tablet TAKE 1 TABLET BY MOUTH TWICE DAILY 180 tablet 2    metFORMIN (GLUCOPHAGE) 1000 MG tablet TAKE 1 TABLET(1000 MG) BY MOUTH TWICE DAILY WITH MEALS 180 tablet 3    metoprolol succinate (TOPROL-XL) 200 MG 24 hr tablet TAKE 1 TABLET(200 MG) BY MOUTH EVERY DAY 90 tablet 1    omega-3 fatty acids/fish oil (FISH OIL-OMEGA-3 FATTY ACIDS) 300-1,000 mg capsule Take by mouth once daily.      pen needle, diabetic (BD NIC 2ND GEN PEN NEEDLE) 32 gauge x 5/32" Ndle USE ONCE DAILY AS DIRECTED 100 each 3    semaglutide (OZEMPIC) 0.25 mg or 0.5 mg (2 mg/3 mL) pen injector Inject 0.5 mg into the skin every 7 days. 3 mL 3    tamsulosin (FLOMAX) 0.4 mg Cap TAKE 1 CAPSULE(0.4 MG) BY MOUTH EVERY DAY 90 capsule 3    triamcinolone acetonide 0.1% (KENALOG) 0.1 % cream Apply topically " 2 (two) times daily. 453.6 g 0    aspirin (ECOTRIN) 81 MG EC tablet Take 1 tablet (81 mg total) by mouth once daily.  0     No current facility-administered medications for this visit.       Review of patient's allergies indicates:  No Known Allergies      Review of Systems   Constitutional: Negative for chills and fever.   Cardiovascular:  Positive for leg swelling. Negative for claudication.   Skin:  Positive for color change, nail changes and suspicious lesions.   Musculoskeletal:  Positive for joint swelling. Negative for joint pain, muscle cramps, muscle weakness and myalgias.   Neurological:  Negative for numbness and paresthesias.   Psychiatric/Behavioral:  Negative for altered mental status.            Objective:      Physical Exam  Constitutional:       General: He is not in acute distress.     Appearance: He is well-developed. He is not diaphoretic.   Cardiovascular:      Pulses:           Dorsalis pedis pulses are 2+ on the right side and 2+ on the left side.        Posterior tibial pulses are 2+ on the right side and 2+ on the left side.      Comments: CFT is < 3 seconds bilateral.  Pedal hair growth is present bilateral.  Mild varicosities noted bilateral.  Moderate non pitting lower extremity edema noted bilateral.  Toes are warm to touch bilateral.    Musculoskeletal:         General: No tenderness.      Comments: Muscle strength 5/5 in all muscle groups bilateral.  No tenderness nor crepitation with ROM of foot/ankle joints bilateral.  No tenderness with palpation of bilateral foot and ankle.  Bilateral pes planus foot type.  Bilateral semi-rigid contracture of toes 2-5.   Skin:     General: Skin is warm and dry.      Capillary Refill: Capillary refill takes 2 to 3 seconds.      Coloration: Skin is not pale.      Findings: No abrasion, bruising, burn, ecchymosis, erythema, laceration, lesion or rash.      Comments: Pedal skin appears edematous bilateral.  Toenails x 10 appear thickened by 2 mm  elongated by 6 mm, and discolored with subungual debris.     Neurological:      Mental Status: He is alert and oriented to person, place, and time.      Sensory: Sensory deficit present.      Motor: No weakness or atrophy.      Comments: Protective sensation per [place] monofilament is intact bilateral.  Vibratory sensation is decreased bilateral.  Light touch is intact bilateral.               Assessment:       Encounter Diagnoses   Name Primary?    Diabetic polyneuropathy associated with type 2 diabetes mellitus Yes    Hammer toes of both feet     Morbid obesity with BMI of 40.0-44.9, adult     Onychomycosis due to dermatophyte          Plan:       Tal was seen today for diabetes mellitus.    Diagnoses and all orders for this visit:    Diabetic polyneuropathy associated with type 2 diabetes mellitus    Hammer toes of both feet    Morbid obesity with BMI of 40.0-44.9, adult    Onychomycosis due to dermatophyte      I counseled the patient on his conditions, their implications and medical management.    Advised to continue wearing shoe gear that accommodates digital deformities.    Discussed the importance of diet and exercise as they pertain to diabetes management and maintaining a healthy BMI.    Shoe inspection. Diabetic Foot Education. Patient reminded of the importance of good nutrition and blood sugar control to help prevent podiatric complications of diabetes. Patient instructed on proper foot hygeine. We discussed wearing proper shoe gear, daily foot inspections, never walking without protective shoe gear, never putting sharp instruments to feet.    With patient's permission, nails were aggressively reduced and debrided x 10  to their soft tissue attachment mechanically and with electric , removing all offending nail and debris.  Patient relates relief following the procedure. He will continue to monitor the areas daily, inspect his feet, wear protective shoe gear when ambulatory,  moisturizer to maintain skin integrity and follow in this office in approximately 4 months, sooner p.[email or web address].    Alex Olson DPM

## 2024-01-25 RX ORDER — BLOOD-GLUCOSE METER
KIT MISCELLANEOUS
Qty: 100 STRIP | Refills: 1 | Status: SHIPPED | OUTPATIENT
Start: 2024-01-25

## 2024-01-25 NOTE — TELEPHONE ENCOUNTER
No care due was identified.  [facility] Embedded Care Due Messages. Reference number: [id number].   1/25/2024 8:30:20 AM CST

## 2024-01-25 NOTE — TELEPHONE ENCOUNTER
Refill Decision Note   Tal Joel  is requesting a refill authorization.  Brief Assessment and Rationale for Refill:  Approve     Medication Therapy Plan:         Comments:     Note composed:11:07 AM 01/25/2024

## 2024-01-29 ENCOUNTER — OFFICE VISIT (OUTPATIENT)
Dept: UROLOGY | Facility: CLINIC | Age: 74
End: 2024-01-29
Payer: MEDICARE

## 2024-01-29 ENCOUNTER — LAB VISIT (OUTPATIENT)
Dept: LAB | Facility: HOSPITAL | Age: 74
End: 2024-01-29
Attending: UROLOGY
Payer: MEDICARE

## 2024-01-29 VITALS — BODY MASS INDEX: 40.88 KG/M2 | HEIGHT: 69 IN | WEIGHT: 276 LBS

## 2024-01-29 DIAGNOSIS — Z12.5 SCREENING FOR PROSTATE CANCER: ICD-10-CM

## 2024-01-29 DIAGNOSIS — N13.8 ENLARGED PROSTATE WITH URINARY OBSTRUCTION: Primary | ICD-10-CM

## 2024-01-29 DIAGNOSIS — N40.1 ENLARGED PROSTATE WITH URINARY OBSTRUCTION: Primary | ICD-10-CM

## 2024-01-29 LAB
BILIRUBIN, UA POC OHS: NEGATIVE
BLOOD, UA POC OHS: NEGATIVE
CLARITY, UA POC OHS: CLEAR
COLOR, UA POC OHS: YELLOW
COMPLEXED PSA SERPL-MCNC: 0.65 NG/ML (ref 0–4)
GLUCOSE, UA POC OHS: >=1000
KETONES, UA POC OHS: NEGATIVE
LEUKOCYTES, UA POC OHS: NEGATIVE
NITRITE, UA POC OHS: NEGATIVE
PH, UA POC OHS: 5.5
PROTEIN, UA POC OHS: NEGATIVE
SPECIFIC GRAVITY, UA POC OHS: 1.01
UROBILINOGEN, UA POC OHS: 0.2

## 2024-01-29 PROCEDURE — 99999PBSHW POCT URINALYSIS(INSTRUMENT): Mod: PBBFAC,,,

## 2024-01-29 PROCEDURE — 81003 URINALYSIS AUTO W/O SCOPE: CPT | Mod: PBBFAC,PO | Performed by: UROLOGY

## 2024-01-29 PROCEDURE — 99999 PR PBB SHADOW E&M-EST. PATIENT-LVL III: CPT | Mod: PBBFAC,,, | Performed by: UROLOGY

## 2024-01-29 PROCEDURE — 36415 COLL VENOUS BLD VENIPUNCTURE: CPT | Mod: PO | Performed by: UROLOGY

## 2024-01-29 PROCEDURE — G2211 COMPLEX E/M VISIT ADD ON: HCPCS | Mod: S$PBB,,, | Performed by: UROLOGY

## 2024-01-29 PROCEDURE — 84153 ASSAY OF PSA TOTAL: CPT | Performed by: UROLOGY

## 2024-01-29 PROCEDURE — 99214 OFFICE O/P EST MOD 30 MIN: CPT | Mod: S$PBB,,, | Performed by: UROLOGY

## 2024-01-29 PROCEDURE — 99213 OFFICE O/P EST LOW 20 MIN: CPT | Mod: PBBFAC,PO | Performed by: UROLOGY

## 2024-01-29 RX ORDER — FINASTERIDE 5 MG/1
5 TABLET, FILM COATED ORAL DAILY
Qty: 90 TABLET | Refills: 3 | Status: SHIPPED | OUTPATIENT
Start: 2024-01-29

## 2024-01-29 RX ORDER — TAMSULOSIN HYDROCHLORIDE 0.4 MG/1
0.4 CAPSULE ORAL DAILY
Qty: 90 CAPSULE | Refills: 3 | Status: SHIPPED | OUTPATIENT
Start: 2024-01-29

## 2024-01-29 NOTE — PROGRESS NOTES
Subjective:       Patient ID: Tal Maldonado is a 73 y.o. male.    Chief Complaint: Follow-up    HPI    73-year-old with BPH.  He has been maintained on Flomax and Finasteride for greater than 10 years he estimates.  He does have a family history of prostate cancer.  His brother had a radical prostatectomy in the past.  His last PSA was 061.  When he takes his Flomax and finasteride he has no bothersome urinary symptoms.  He denies hematuria and dysuria.  He has no previous urinary tract infections.  He is overall satisfied with his urinary symptoms.    Urine dipstick shows negative for all components except glucose.     Component PSA, Screen   Latest Ref Rng & Units 0.00 - 4.00 ng/mL   9/23/2022 0.61   3/19/2021 0.66   1/27/2020 0.52       Review of Systems   Constitutional:  Negative for fever.   Genitourinary:  Negative for dysuria and hematuria.       Objective:      Physical Exam  Vitals reviewed.   Constitutional:       Appearance: He is well-developed.   Pulmonary:      Effort: Pulmonary effort is normal.   Genitourinary:     Prostate: Enlarged (50g). Not tender and no nodules present.   Skin:     Findings: No rash.   Neurological:      Mental Status: He is alert and oriented to person, place, and time.         Assessment:       1. Enlarged prostate with urinary obstruction    2. Screening for prostate cancer        Plan:       Enlarged prostate with urinary obstruction  -     POCT Urinalysis(Instrument)    Screening for prostate cancer  -     PSA, Screening; Future; Expected date: 01/29/2024    Other orders  -     tamsulosin (FLOMAX) 0.4 mg Cap; Take 1 capsule (0.4 mg total) by mouth once daily.  Dispense: 90 capsule; Refill: 3  -     finasteride (PROSCAR) 5 mg tablet; Take 1 tablet (5 mg total) by mouth once daily.  Dispense: 90 tablet; Refill: 3      Continue Flomax and finasteride.  Update PSA.  Annual follow-up.

## 2024-02-01 ENCOUNTER — PATIENT MESSAGE (OUTPATIENT)
Dept: FAMILY MEDICINE | Facility: CLINIC | Age: 74
End: 2024-02-01
Payer: MEDICARE

## 2024-02-02 ENCOUNTER — OFFICE VISIT (OUTPATIENT)
Dept: OPTOMETRY | Facility: CLINIC | Age: 74
End: 2024-02-02
Payer: MEDICARE

## 2024-02-02 DIAGNOSIS — H53.001 AMBLYOPIA OF RIGHT EYE: ICD-10-CM

## 2024-02-02 DIAGNOSIS — E11.9 TYPE 2 DIABETES MELLITUS WITHOUT RETINOPATHY: Primary | ICD-10-CM

## 2024-02-02 DIAGNOSIS — H52.7 REFRACTIVE ERROR: ICD-10-CM

## 2024-02-02 DIAGNOSIS — H40.033 ANATOMICAL NARROW ANGLE, BILATERAL: ICD-10-CM

## 2024-02-02 DIAGNOSIS — H25.13 NUCLEAR SCLEROSIS OF BOTH EYES: ICD-10-CM

## 2024-02-02 PROCEDURE — 92014 COMPRE OPH EXAM EST PT 1/>: CPT | Mod: S$PBB,,, | Performed by: OPTOMETRIST

## 2024-02-02 PROCEDURE — 92015 DETERMINE REFRACTIVE STATE: CPT | Mod: ,,, | Performed by: OPTOMETRIST

## 2024-02-02 PROCEDURE — 99213 OFFICE O/P EST LOW 20 MIN: CPT | Mod: PBBFAC,PO | Performed by: OPTOMETRIST

## 2024-02-02 PROCEDURE — 99999 PR PBB SHADOW E&M-EST. PATIENT-LVL III: CPT | Mod: PBBFAC,,, | Performed by: OPTOMETRIST

## 2024-02-02 NOTE — PROGRESS NOTES
HPI    Pt here for DM exam RAFAELA 01/09/23      Pt denies changes in vision. Pt needs updated spec Rx. Pt notices floaters   but denies flashes. DM controlled w aid.    Hemoglobin A1C       Date                     Value               Ref Range             Status                11/03/2023               7.1 (H)             4.0 - 5.6 %           Final                      05/01/2023               6.8 (H)             4.0 - 5.6 %           Final                      09/23/2022               6.8 (H)             4.0 - 5.6 %           Final                     ----------   Last edited by Taylor Gamez on 2/2/2024  8:33 AM.            Assessment /Plan     For exam results, see Encounter Report.    Type 2 diabetes mellitus without retinopathy    Nuclear sclerosis of both eyes    Refractive error    Amblyopia of right eye    Anatomical narrow angle, bilateral      No diabetic retinopathy, no csme. Return in 1 year for dilated eye exam.  2. Educated pt on presence of cataracts, early psc left eye, and effects on vision. No surgery at this time. Recheck in one year.  3. New Spectacle Rx given, discussed different options for glasses. RTC 1 year routine eye exam.     4. Monitor condition. Patient to report any changes. RTC 1 year recheck.  5. IOP normal, monitor yearly.

## 2024-03-04 ENCOUNTER — PATIENT MESSAGE (OUTPATIENT)
Dept: OPTOMETRY | Facility: CLINIC | Age: 74
End: 2024-03-04
Payer: MEDICARE

## 2024-03-04 ENCOUNTER — TELEPHONE (OUTPATIENT)
Dept: OPHTHALMOLOGY | Facility: CLINIC | Age: 74
End: 2024-03-04
Payer: MEDICARE

## 2024-03-22 DIAGNOSIS — E11.42 TYPE 2 DIABETES MELLITUS WITH DIABETIC POLYNEUROPATHY, WITHOUT LONG-TERM CURRENT USE OF INSULIN: ICD-10-CM

## 2024-03-22 NOTE — TELEPHONE ENCOUNTER
No care due was identified.  [facility] Embedded Care Due Messages. Reference number: [id number].   3/22/2024 12:25:48 PM CDT

## 2024-03-23 RX ORDER — SEMAGLUTIDE 0.68 MG/ML
0.5 INJECTION, SOLUTION SUBCUTANEOUS
Qty: 3 ML | Refills: 3 | Status: SHIPPED | OUTPATIENT
Start: 2024-03-23 | End: 2024-04-10 | Stop reason: SDUPTHER

## 2024-04-10 DIAGNOSIS — E11.42 TYPE 2 DIABETES MELLITUS WITH DIABETIC POLYNEUROPATHY, WITHOUT LONG-TERM CURRENT USE OF INSULIN: ICD-10-CM

## 2024-04-10 NOTE — TELEPHONE ENCOUNTER
No care due was identified.  Health [facility] Embedded Care Due Messages. Reference number: [id number].   4/10/2024 10:18:59 AM CDT

## 2024-04-10 NOTE — TELEPHONE ENCOUNTER
No care due was identified.  Health [facility] Embedded Care Due Messages. Reference number: [id number].   4/10/2024 10:21:15 AM CDT

## 2024-04-11 RX ORDER — DAPAGLIFLOZIN 5 MG/1
5 TABLET, FILM COATED ORAL DAILY
Qty: 90 TABLET | Refills: 0 | Status: SHIPPED | OUTPATIENT
Start: 2024-04-11

## 2024-04-11 RX ORDER — SEMAGLUTIDE 0.68 MG/ML
0.5 INJECTION, SOLUTION SUBCUTANEOUS
Qty: 9 ML | Refills: 0 | Status: SHIPPED | OUTPATIENT
Start: 2024-04-11 | End: 2024-05-06 | Stop reason: DRUGHIGH

## 2024-04-11 NOTE — TELEPHONE ENCOUNTER
Refill Decision Note   Tal Joel  is requesting a refill authorization.  Brief Assessment and Rationale for Refill:  Approve     Medication Therapy Plan:        Comments:     Note composed:9:50 AM 04/11/2024

## 2024-04-11 NOTE — TELEPHONE ENCOUNTER
Refill Decision Note   Tal Joel  is requesting a refill authorization.  Brief Assessment and Rationale for Refill:  Approve     Medication Therapy Plan: med is on active list      Comments:     Note composed:9:52 AM 04/11/2024

## 2024-04-23 RX ORDER — METOPROLOL SUCCINATE 200 MG/1
TABLET, EXTENDED RELEASE ORAL
Qty: 90 TABLET | Refills: 1 | Status: SHIPPED | OUTPATIENT
Start: 2024-04-23

## 2024-04-23 NOTE — TELEPHONE ENCOUNTER
Refill Decision Note   Tal Joel  is requesting a refill authorization.  Brief Assessment and Rationale for Refill:  Approve     Medication Therapy Plan:        Comments:     Note composed:5:43 PM 04/23/2024

## 2024-04-23 NOTE — TELEPHONE ENCOUNTER
No care due was identified.  [facility] Embedded Care Due Messages. Reference number: [id number].   4/23/2024 7:28:23 AM CDT

## 2024-04-25 RX ORDER — METFORMIN HYDROCHLORIDE 1000 MG/1
1000 TABLET ORAL 2 TIMES DAILY WITH MEALS
Qty: 180 TABLET | Refills: 3 | OUTPATIENT
Start: 2024-04-25

## 2024-04-25 RX ORDER — METFORMIN HYDROCHLORIDE 1000 MG/1
TABLET ORAL
Qty: 180 TABLET | Refills: 0 | Status: SHIPPED | OUTPATIENT
Start: 2024-04-25 | End: 2024-05-06 | Stop reason: SDUPTHER

## 2024-04-25 NOTE — TELEPHONE ENCOUNTER
Refill Decision Note   Tal Maldonado  is requesting a refill authorization.  Brief Assessment and Rationale for Refill:  Quick Discontinue     Medication Therapy Plan:  Receipt confirmed by pharmacy (4/25/2024 11:05 AM CDT)      Comments:     Note composed:6:25 PM 04/25/2024

## 2024-04-25 NOTE — TELEPHONE ENCOUNTER
No care due was identified.  [facility] Embedded Care Due Messages. Reference number: [id number].   4/25/2024 9:05:45 AM CDT

## 2024-04-25 NOTE — TELEPHONE ENCOUNTER
No care due was identified.  [facility] Embedded Care Due Messages. Reference number: [id number].   4/25/2024 9:02:10 AM CDT

## 2024-04-29 ENCOUNTER — LAB VISIT (OUTPATIENT)
Dept: LAB | Facility: HOSPITAL | Age: 74
End: 2024-04-29
Attending: INTERNAL MEDICINE
Payer: MEDICARE

## 2024-04-29 DIAGNOSIS — E78.2 MIXED HYPERLIPIDEMIA: ICD-10-CM

## 2024-04-29 DIAGNOSIS — M10.49 OTHER SECONDARY ACUTE GOUT OF MULTIPLE SITES: ICD-10-CM

## 2024-04-29 DIAGNOSIS — D69.6 THROMBOCYTOPENIA: ICD-10-CM

## 2024-04-29 DIAGNOSIS — E11.42 TYPE 2 DIABETES MELLITUS WITH DIABETIC POLYNEUROPATHY, WITHOUT LONG-TERM CURRENT USE OF INSULIN: ICD-10-CM

## 2024-04-29 DIAGNOSIS — E03.4 HYPOTHYROIDISM DUE TO ACQUIRED ATROPHY OF THYROID: ICD-10-CM

## 2024-04-29 LAB
ALBUMIN SERPL BCP-MCNC: 3.9 G/DL (ref 3.5–5.2)
ALP SERPL-CCNC: 62 U/L (ref 55–135)
ALT SERPL W/O P-5'-P-CCNC: 47 U/L (ref 10–44)
ANION GAP SERPL CALC-SCNC: 14 MMOL/L (ref 8–16)
AST SERPL-CCNC: 29 U/L (ref 10–40)
BASOPHILS # BLD AUTO: 0.04 K/UL (ref 0–0.2)
BASOPHILS NFR BLD: 0.6 % (ref 0–1.9)
BILIRUB SERPL-MCNC: 1 MG/DL (ref 0.1–1)
BUN SERPL-MCNC: 12 MG/DL (ref 8–23)
CALCIUM SERPL-MCNC: 9.4 MG/DL (ref 8.7–10.5)
CHLORIDE SERPL-SCNC: 100 MMOL/L (ref 95–110)
CHOLEST SERPL-MCNC: 143 MG/DL (ref 120–199)
CHOLEST/HDLC SERPL: 4 {RATIO} (ref 2–5)
CO2 SERPL-SCNC: 27 MMOL/L (ref 23–29)
CREAT SERPL-MCNC: 0.9 MG/DL (ref 0.5–1.4)
DIFFERENTIAL METHOD BLD: ABNORMAL
EOSINOPHIL # BLD AUTO: 0.1 K/UL (ref 0–0.5)
EOSINOPHIL NFR BLD: 1.4 % (ref 0–8)
ERYTHROCYTE [DISTWIDTH] IN BLOOD BY AUTOMATED COUNT: 13.1 % (ref 11.5–14.5)
EST. GFR  (NO RACE VARIABLE): >60 ML/MIN/1.73 M^2
ESTIMATED AVG GLUCOSE: 154 MG/DL (ref 68–131)
GLUCOSE SERPL-MCNC: 183 MG/DL (ref 70–110)
HBA1C MFR BLD: 7 % (ref 4–5.6)
HCT VFR BLD AUTO: 49.1 % (ref 40–54)
HDLC SERPL-MCNC: 36 MG/DL (ref 40–75)
HDLC SERPL: 25.2 % (ref 20–50)
HGB BLD-MCNC: 16.4 G/DL (ref 14–18)
IMM GRANULOCYTES # BLD AUTO: 0.05 K/UL (ref 0–0.04)
IMM GRANULOCYTES NFR BLD AUTO: 0.8 % (ref 0–0.5)
LDLC SERPL CALC-MCNC: 76.6 MG/DL (ref 63–159)
LYMPHOCYTES # BLD AUTO: 1.5 K/UL (ref 1–4.8)
LYMPHOCYTES NFR BLD: 22.5 % (ref 18–48)
MCH RBC QN AUTO: 31.3 PG (ref 27–31)
MCHC RBC AUTO-ENTMCNC: 33.4 G/DL (ref 32–36)
MCV RBC AUTO: 94 FL (ref 82–98)
MONOCYTES # BLD AUTO: 0.7 K/UL (ref 0.3–1)
MONOCYTES NFR BLD: 10.3 % (ref 4–15)
NEUTROPHILS # BLD AUTO: 4.2 K/UL (ref 1.8–7.7)
NEUTROPHILS NFR BLD: 64.4 % (ref 38–73)
NONHDLC SERPL-MCNC: 107 MG/DL
NRBC BLD-RTO: 0 /100 WBC
PLATELET # BLD AUTO: 115 K/UL (ref 150–450)
PMV BLD AUTO: 10.9 FL (ref 9.2–12.9)
POTASSIUM SERPL-SCNC: 4.1 MMOL/L (ref 3.5–5.1)
PROT SERPL-MCNC: 6.3 G/DL (ref 6–8.4)
RBC # BLD AUTO: 5.24 M/UL (ref 4.6–6.2)
SODIUM SERPL-SCNC: 141 MMOL/L (ref 136–145)
TRIGL SERPL-MCNC: 152 MG/DL (ref 30–150)
TSH SERPL DL<=0.005 MIU/L-ACNC: 2.62 UIU/ML (ref 0.4–4)
URATE SERPL-MCNC: 5.7 MG/DL (ref 3.4–7)
WBC # BLD AUTO: 6.48 K/UL (ref 3.9–12.7)

## 2024-04-29 PROCEDURE — 80061 LIPID PANEL: CPT | Performed by: INTERNAL MEDICINE

## 2024-04-29 PROCEDURE — 84443 ASSAY THYROID STIM HORMONE: CPT | Performed by: INTERNAL MEDICINE

## 2024-04-29 PROCEDURE — 83036 HEMOGLOBIN GLYCOSYLATED A1C: CPT | Performed by: INTERNAL MEDICINE

## 2024-04-29 PROCEDURE — 36415 COLL VENOUS BLD VENIPUNCTURE: CPT | Mod: PO | Performed by: INTERNAL MEDICINE

## 2024-04-29 PROCEDURE — 85025 COMPLETE CBC W/AUTO DIFF WBC: CPT | Performed by: INTERNAL MEDICINE

## 2024-04-29 PROCEDURE — 80053 COMPREHEN METABOLIC PANEL: CPT | Performed by: INTERNAL MEDICINE

## 2024-04-29 PROCEDURE — 84550 ASSAY OF BLOOD/URIC ACID: CPT | Performed by: INTERNAL MEDICINE

## 2024-05-06 ENCOUNTER — OFFICE VISIT (OUTPATIENT)
Dept: FAMILY MEDICINE | Facility: CLINIC | Age: 74
End: 2024-05-06
Payer: MEDICARE

## 2024-05-06 VITALS
WEIGHT: 273.38 LBS | SYSTOLIC BLOOD PRESSURE: 130 MMHG | HEART RATE: 75 BPM | DIASTOLIC BLOOD PRESSURE: 78 MMHG | BODY MASS INDEX: 40.37 KG/M2 | OXYGEN SATURATION: 96 %

## 2024-05-06 DIAGNOSIS — E66.01 MORBID OBESITY WITH BMI OF 40.0-44.9, ADULT: ICD-10-CM

## 2024-05-06 DIAGNOSIS — G47.33 OSA ON CPAP: ICD-10-CM

## 2024-05-06 DIAGNOSIS — M10.49 OTHER SECONDARY ACUTE GOUT OF MULTIPLE SITES: ICD-10-CM

## 2024-05-06 DIAGNOSIS — I10 ESSENTIAL HYPERTENSION: ICD-10-CM

## 2024-05-06 DIAGNOSIS — E03.4 HYPOTHYROIDISM DUE TO ACQUIRED ATROPHY OF THYROID: ICD-10-CM

## 2024-05-06 DIAGNOSIS — E78.2 MIXED HYPERLIPIDEMIA: ICD-10-CM

## 2024-05-06 DIAGNOSIS — N40.0 BENIGN PROSTATIC HYPERPLASIA WITHOUT LOWER URINARY TRACT SYMPTOMS: ICD-10-CM

## 2024-05-06 DIAGNOSIS — E11.42 TYPE 2 DIABETES MELLITUS WITH DIABETIC POLYNEUROPATHY, WITHOUT LONG-TERM CURRENT USE OF INSULIN: Primary | ICD-10-CM

## 2024-05-06 DIAGNOSIS — D69.6 THROMBOCYTOPENIA: ICD-10-CM

## 2024-05-06 PROCEDURE — 99214 OFFICE O/P EST MOD 30 MIN: CPT | Mod: PBBFAC,PO | Performed by: INTERNAL MEDICINE

## 2024-05-06 PROCEDURE — G2211 COMPLEX E/M VISIT ADD ON: HCPCS | Mod: S$PBB,,, | Performed by: INTERNAL MEDICINE

## 2024-05-06 PROCEDURE — 99999 PR PBB SHADOW E&M-EST. PATIENT-LVL IV: CPT | Mod: PBBFAC,,, | Performed by: INTERNAL MEDICINE

## 2024-05-06 PROCEDURE — 99214 OFFICE O/P EST MOD 30 MIN: CPT | Mod: S$PBB,,, | Performed by: INTERNAL MEDICINE

## 2024-05-06 RX ORDER — LEVOTHYROXINE SODIUM 150 UG/1
150 TABLET ORAL
Qty: 90 TABLET | Refills: 3 | Status: SHIPPED | OUTPATIENT
Start: 2024-05-06 | End: 2025-05-06

## 2024-05-06 RX ORDER — LISINOPRIL AND HYDROCHLOROTHIAZIDE 12.5; 2 MG/1; MG/1
1 TABLET ORAL 2 TIMES DAILY
Qty: 180 TABLET | Refills: 3 | Status: SHIPPED | OUTPATIENT
Start: 2024-05-06 | End: 2025-05-06

## 2024-05-06 RX ORDER — METFORMIN HYDROCHLORIDE 1000 MG/1
1000 TABLET ORAL 2 TIMES DAILY WITH MEALS
Qty: 180 TABLET | Refills: 3 | Status: SHIPPED | OUTPATIENT
Start: 2024-05-06 | End: 2025-05-06

## 2024-05-06 RX ORDER — SEMAGLUTIDE 1.34 MG/ML
1 INJECTION, SOLUTION SUBCUTANEOUS
Qty: 3 ML | Refills: 11 | Status: SHIPPED | OUTPATIENT
Start: 2024-05-06 | End: 2025-05-06

## 2024-05-06 NOTE — PROGRESS NOTES
[facility]  [facility] [place]   [address]       Patient ID: Tal Maldonado     Chief Complaint:   Chief Complaint   Patient presents with    Follow-up    Results        HPI:  Routine follow-up for labs and overall he is doing well.  He was a little concerned that his A1c is still 7 and would like it a little lower so we will increase Ozempic to 1 mg every week.  I am okay with his A1c being 6.5-7 but I do not want her getting any lower than that.  Vital signs do look good.  Other labs look good.  His mildly low platelets are stable.  We will reconvene in 6 months with some labs.  I will allow him to get a COVID booster at his discretion and I think there is a new version coming out.    Review of Systems       Negative     Objective:      Physical Exam   Physical Exam       Obese    Vitals:   Vitals:    05/06/24 0913   BP: 130/78   Pulse: 75   SpO2: 96%   Weight: 124 kg (273 lb 5.9 oz)        Assessment:           Plan:       Tal Maldonado  was seen today for follow-up and may need lab work.    Diagnoses and all orders for this visit:    Tal was seen today for follow-up and results.    Diagnoses and all orders for this visit:    Type 2 diabetes mellitus with diabetic polyneuropathy, without long-term current use of insulin  -     semaglutide (OZEMPIC) 1 mg/dose (4 mg/3 mL); Inject 1 mg into the skin every 7 days.  -     metFORMIN (GLUCOPHAGE) 1000 MG tablet; Take 1 tablet (1,000 mg total) by mouth 2 (two) times daily with meals.  -     Hemoglobin A1C; Future  -     Microalbumin/Creatinine Ratio, Urine; Future  -     CBC Auto Differential; Future  -     Comprehensive Metabolic Panel; Future  Controlled with meds but Monitor Labs on Ozempic 1 mg     Hypothyroidism due to acquired atrophy of thyroid  -     levothyroxine (SYNTHROID) 150 MCG tablet; Take 1 tablet (150 mcg total) by mouth before breakfast.  -     TSH; Future  Controlled with med     Essential hypertension  -      lisinopriL-hydrochlorothiazide (PRINZIDE,ZESTORETIC) 20-12.5 mg per tablet; Take 1 tablet by mouth 2 (two) times a day. TAKE 1 TABLET BY MOUTH TWICE DAILY  Controlled with med     Mixed hyperlipidemia  LDL almost at goal   Monitor as he loses weight     Benign prostatic hyperplasia without lower urinary tract symptoms  Plan per Dr. Rivera     Thrombocytopenia  Mild and stable    Morbid obesity with BMI of 40.0-44.9, adult  Monitor weight on higher dose of Ozempic     WINSOME on CPAP  Compliant with CPAP and getting good benefit     Other secondary acute gout of multiple sites  -     Uric Acid; Future  Monitor Labs      Visit today included increased complexity associated with the care of the episodic problem Diabetes hypertension hyperlipidemia  addressed and managing the longitudinal care of the patient due to the serious and/or complex managed problem(s) .      Marlon Iglesias MD

## 2024-05-09 DIAGNOSIS — M10.49 OTHER SECONDARY ACUTE GOUT OF MULTIPLE SITES: ICD-10-CM

## 2024-05-09 RX ORDER — ALLOPURINOL 100 MG/1
200 TABLET ORAL DAILY
Qty: 180 TABLET | Refills: 3 | Status: SHIPPED | OUTPATIENT
Start: 2024-05-09

## 2024-05-09 NOTE — TELEPHONE ENCOUNTER
Refill Decision Note   Tal Joel  is requesting a refill authorization.  Brief Assessment and Rationale for Refill:  Approve     Medication Therapy Plan:         Comments:     Note composed:12:16 PM 05/09/2024

## 2024-05-09 NOTE — TELEPHONE ENCOUNTER
No care due was identified.  [facility] Embedded Care Due Messages. Reference number: [id number].   5/09/2024 8:14:34 AM CDT

## 2024-05-23 ENCOUNTER — OFFICE VISIT (OUTPATIENT)
Dept: PODIATRY | Facility: CLINIC | Age: 74
End: 2024-05-23
Payer: MEDICARE

## 2024-05-23 VITALS — BODY MASS INDEX: 40.49 KG/M2 | WEIGHT: 273.38 LBS | HEIGHT: 69 IN

## 2024-05-23 DIAGNOSIS — M20.42 HAMMER TOES OF BOTH FEET: ICD-10-CM

## 2024-05-23 DIAGNOSIS — Q82.8 POROKERATOSIS: ICD-10-CM

## 2024-05-23 DIAGNOSIS — B35.1 ONYCHOMYCOSIS DUE TO DERMATOPHYTE: ICD-10-CM

## 2024-05-23 DIAGNOSIS — E11.42 DIABETIC POLYNEUROPATHY ASSOCIATED WITH TYPE 2 DIABETES MELLITUS: Primary | ICD-10-CM

## 2024-05-23 DIAGNOSIS — E66.01 MORBID OBESITY WITH BMI OF 40.0-44.9, ADULT: ICD-10-CM

## 2024-05-23 DIAGNOSIS — M20.41 HAMMER TOES OF BOTH FEET: ICD-10-CM

## 2024-05-23 PROCEDURE — 99499 UNLISTED E&M SERVICE: CPT | Mod: S$PBB,,, | Performed by: PODIATRIST

## 2024-05-23 PROCEDURE — 99999 PR PBB SHADOW E&M-EST. PATIENT-LVL II: CPT | Mod: PBBFAC,,, | Performed by: PODIATRIST

## 2024-05-23 PROCEDURE — 99212 OFFICE O/P EST SF 10 MIN: CPT | Mod: PBBFAC,PO,25 | Performed by: PODIATRIST

## 2024-05-23 PROCEDURE — 11721 DEBRIDE NAIL 6 OR MORE: CPT | Mod: Q9,PBBFAC,PO | Performed by: PODIATRIST

## 2024-05-23 PROCEDURE — 11721 DEBRIDE NAIL 6 OR MORE: CPT | Mod: 59,Q9,S$PBB, | Performed by: PODIATRIST

## 2024-05-23 PROCEDURE — 11056 PARNG/CUTG B9 HYPRKR LES 2-4: CPT | Mod: Q9,S$PBB,, | Performed by: PODIATRIST

## 2024-05-23 PROCEDURE — 11056 PARNG/CUTG B9 HYPRKR LES 2-4: CPT | Mod: Q9,PBBFAC,PO | Performed by: PODIATRIST

## 2024-05-23 NOTE — PROGRESS NOTES
Subjective:      Patient ID: Tal Maldonado is a 73 y.o. male.    Chief Complaint: Wound Care      aTl is a 73 y.o. male who presents to the clinic for evaluation and treatment of diabetic feet. Tal has a past medical history of Abnormal results of liver function studies  (9/23/2020), Amblyopia, Arthritis of hand, BPH (benign prostatic hyperplasia), Cataract, Colon polyps, COPD (chronic obstructive pulmonary disease), Coronary artery disease, Diabetes mellitus, Elevated liver enzymes (9/23/2020), Hypertension, WINSOME on CPAP, Other hyperlipidemia, Sensorineural hearing loss (SNHL) of left ear, Superficial thrombophlebitis, and Superficial thrombophlebitis. Patient relates having toenails that are in need of trimming.  Denies being painful with wearing shoe gear.  Has not attempted to treat.  Continues with control over his blood glucose.  Denies any additional pedal complaints.    PCP: Marlon Iglesias MD    Date Last Seen by PCP: 5/24    Hemoglobin A1C   Date Value Ref Range Status   04/29/2024 7.0 (H) 4.0 - 5.6 % Final     Comment:     ADA Screening Guidelines:  5.7-6.4%  Consistent with prediabetes  >or=6.5%  Consistent with diabetes    High levels of fetal hemoglobin interfere with the HbA1C  assay. Heterozygous hemoglobin variants (HbS, HgC, etc)do  not significantly interfere with this assay.   However, presence of multiple variants may affect accuracy.     11/03/2023 7.1 (H) 4.0 - 5.6 % Final     Comment:     ADA Screening Guidelines:  5.7-6.4%  Consistent with prediabetes  >or=6.5%  Consistent with diabetes    High levels of fetal hemoglobin interfere with the HbA1C  assay. Heterozygous hemoglobin variants (HbS, HgC, etc)do  not significantly interfere with this assay.   However, presence of multiple variants may affect accuracy.     05/01/2023 6.8 (H) 4.0 - 5.6 % Final     Comment:     ADA Screening Guidelines:  5.7-6.4%  Consistent with prediabetes  >or=6.5%  Consistent with diabetes    High levels of  fetal hemoglobin interfere with the HbA1C  assay. Heterozygous hemoglobin variants (HbS, HgC, etc)do  not significantly interfere with this assay.   However, presence of multiple variants may affect accuracy.             Past Medical History:   Diagnosis Date    Abnormal results of liver function studies  9/23/2020    Amblyopia     OD    Arthritis of hand     BPH (benign prostatic hyperplasia)     Cataract     Colon polyps     COPD (chronic obstructive pulmonary disease)     Coronary artery disease     Diabetes mellitus     Elevated liver enzymes 9/23/2020    Hypertension     WINSOME on CPAP     Other hyperlipidemia     Sensorineural hearing loss (SNHL) of left ear     Superficial thrombophlebitis     Superficial thrombophlebitis        Past Surgical History:   Procedure Laterality Date    APPENDECTOMY      blephroplasty Bilateral 2009    COLONOSCOPY      COLONOSCOPY N/A 3/12/2021    Procedure: COLONOSCOPY;  Surgeon: Dandre Hobbs Jr., MD;  Location: [facility];  Service: Endoscopy;  Laterality: N/A;    DUPUYTREN CONTRACTURE RELEASE Bilateral     HERNIA REPAIR      Umbilical    TONSILLECTOMY         Family History   Problem Relation Name Age of Onset    Hypertension Mother      Diabetes Mother      Cataracts Mother      Glaucoma Mother          ?    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    Tobacco Use    Smoking status: Never    Smokeless tobacco: Never   Substance and Sexual Activity    Alcohol use: Yes     Alcohol/week: 7.0 standard drinks of alcohol     Types: 7 Glasses of wine per week    Drug use: Never    Sexual activity: Yes     Partners: Female   Social History Narrative    ** Merged History Encounter **          Social Determinants of Health     Financial Resource Strain: Low Risk   (5/3/2024)    Overall Financial Resource Strain (CARDIA)     Difficulty of Paying Living Expenses: Not hard at all   Food Insecurity: No Food Insecurity (5/3/2024)    Hunger Vital Sign     Worried About Running Out of Food in the Last Year: Never true     Ran Out of Food in the Last Year: Never true   Transportation Needs: No Transportation Needs (5/3/2024)    PRAPARE - Transportation     Lack of Transportation (Medical): No     Lack of Transportation (Non-Medical): No   Physical Activity: Insufficiently Active (5/3/2024)    Exercise Vital Sign     Days of Exercise per Week: 3 days     Minutes of Exercise per Session: 30 min   Stress: No Stress Concern Present (5/3/2024)    [employer] - Occupational Stress Questionnaire     Feeling of Stress : Not at all   Housing Stability: Low Risk  (11/7/2022)    Housing Stability Vital Sign     Unable to Pay for Housing in the Last Year: No     Number of Places Lived in the Last Year: 1     Unstable Housing in the Last Year: No       Current Outpatient Medications   Medication Sig Dispense Refill    allopurinoL (ZYLOPRIM) 100 MG tablet Take 2 tablets (200 mg total) by mouth once daily. 180 tablet 3    amLODIPine (NORVASC) 10 MG tablet TAKE 1 TABLET(10 MG) BY MOUTH EVERY DAY 90 tablet 2    aspirin (ECOTRIN) 81 MG EC tablet Take 1 tablet (81 mg total) by mouth once daily.  0    atorvastatin (LIPITOR) 80 MG tablet TAKE 1 TABLET(80 MG) BY MOUTH EVERY EVENING 90 tablet 3    blood sugar diagnostic Strp To check BG 2 times daily, to use with insurance preferred meter 100 each 3    blood-glucose meter kit To check BG 2 times daily, to use with insurance preferred meter 1 each 0    calcipotriene-betamethasone 0.005-0.064 % Crea Apply 1 application topically once daily at 6am. 60 g 3    clotrimazole-betamethasone 1-0.05% (LOTRISONE) cream Apply topically 2 (two) times daily.      dapagliflozin propanediol (FARXIGA) 5 mg Tab tablet Take 1 tablet (5 mg total)  "by mouth once daily. 90 tablet 0    finasteride (PROSCAR) 5 mg tablet Take 1 tablet (5 mg total) by mouth once daily. 90 tablet 3    FREESTYLE LITE STRIPS Strp USE AS DIRECTED TO TEST ONCE DAILY 100 strip 1    glipiZIDE (GLUCOTROL) 10 MG TR24 TAKE 1 TABLET(10 MG) BY MOUTH DAILY WITH BREAKFAST 90 tablet 0    glucosamine-chondroitin 500-400 mg tablet Take 1 tablet by mouth 3 (three) times daily.      indomethacin (INDOCIN) 50 MG capsule Take 1 capsule (50 mg total) by mouth 2 (two) times daily as needed (gout flare). 30 capsule 1    lancets (ACCU-CHEK FASTCLIX LANCET DRUM) Misc Inject 1 Units into the skin 2 (two) times a day. 200 each 11    LANCETS MISC by Misc.(Non-Drug; Combo Route) route.      levothyroxine (SYNTHROID) 150 MCG tablet Take 1 tablet (150 mcg total) by mouth before breakfast. 90 tablet 3    lisinopriL-hydrochlorothiazide (PRINZIDE,ZESTORETIC) 20-12.5 mg per tablet Take 1 tablet by mouth 2 (two) times a day. TAKE 1 TABLET BY MOUTH TWICE DAILY 180 tablet 3    metFORMIN (GLUCOPHAGE) 1000 MG tablet Take 1 tablet (1,000 mg total) by mouth 2 (two) times daily with meals. 180 tablet 3    metoprolol succinate (TOPROL-XL) 200 MG 24 hr tablet TAKE 1 TABLET(200 MG) BY MOUTH EVERY DAY 90 tablet 1    omega-3 fatty acids/fish oil (FISH OIL-OMEGA-3 FATTY ACIDS) 300-1,000 mg capsule Take by mouth once daily.      pen needle, diabetic (BD NIC 2ND GEN PEN NEEDLE) 32 gauge x 5/32" Ndle USE ONCE DAILY AS DIRECTED 100 each 3    semaglutide (OZEMPIC) 1 mg/dose (4 mg/3 mL) Inject 1 mg into the skin every 7 days. 3 mL 11    tamsulosin (FLOMAX) 0.4 mg Cap Take 1 capsule (0.4 mg total) by mouth once daily. 90 capsule 3    triamcinolone acetonide 0.1% (KENALOG) 0.1 % cream Apply topically 2 (two) times daily. 453.6 g 0     No current facility-administered medications for this visit.       Review of patient's allergies indicates:  No Known Allergies      Review of Systems   Constitutional: Negative for chills and fever. "   Cardiovascular:  Positive for leg swelling. Negative for claudication.   Skin:  Positive for color change, nail changes and suspicious lesions.   Musculoskeletal:  Positive for joint swelling. Negative for joint pain, muscle cramps, muscle weakness and myalgias.   Neurological:  Negative for numbness and paresthesias.   Psychiatric/Behavioral:  Negative for altered mental status.            Objective:      Physical Exam  Constitutional:       General: He is not in acute distress.     Appearance: He is well-developed. He is not diaphoretic.   Cardiovascular:      Pulses:           Dorsalis pedis pulses are 2+ on the right side and 2+ on the left side.        Posterior tibial pulses are 2+ on the right side and 2+ on the left side.      Comments: CFT is < 3 seconds bilateral.  Pedal hair growth is present bilateral.  Mild varicosities noted bilateral.  Moderate non pitting lower extremity edema noted bilateral.  Toes are warm to touch bilateral.    Musculoskeletal:         General: No tenderness.      Comments: Muscle strength 5/5 in all muscle groups bilateral.  No tenderness nor crepitation with ROM of foot/ankle joints bilateral.  No tenderness with palpation of bilateral foot and ankle.  Bilateral pes planus foot type.  Bilateral semi-rigid contracture of toes 2-5.   Skin:     General: Skin is warm and dry.      Capillary Refill: Capillary refill takes 2 to 3 seconds.      Coloration: Skin is not pale.      Findings: Lesion present. No abrasion, bruising, burn, ecchymosis, erythema, laceration or rash.      Comments: Pedal skin appears edematous bilateral.  Toenails x 10 appear thickened by 2 mm elongated by 4 mm, and discolored with subungual debris.  Porokeratosis noted to bilateral sub 3rd met head.   Neurological:      Mental Status: He is alert and oriented to person, place, and time.      Sensory: Sensory deficit present.      Motor: No weakness or atrophy.      Comments: Protective sensation per  [place] monofilament is intact bilateral.  Vibratory sensation is decreased bilateral.  Light touch is intact bilateral.               Assessment:       Encounter Diagnoses   Name Primary?    Diabetic polyneuropathy associated with type 2 diabetes mellitus Yes    Hammer toes of both feet     Morbid obesity with BMI of 40.0-44.9, adult     Onychomycosis due to dermatophyte     Porokeratosis          Plan:       Tal was seen today for wound care.    Diagnoses and all orders for this visit:    Diabetic polyneuropathy associated with type 2 diabetes mellitus    Hammer toes of both feet    Morbid obesity with BMI of 40.0-44.9, adult    Onychomycosis due to dermatophyte    Porokeratosis      I counseled the patient on his conditions, their implications and medical management.    Advised to continue wearing shoe gear that accommodates digital deformities.    Discussed the importance of diet and exercise as they pertain to diabetes management and maintaining a healthy BMI.    Shoe inspection. Diabetic Foot Education. Patient reminded of the importance of good nutrition and blood sugar control to help prevent podiatric complications of diabetes. Patient instructed on proper foot hygeine. We discussed wearing proper shoe gear, daily foot inspections, never walking without protective shoe gear, never putting sharp instruments to feet.    With patient's permission, nails were aggressively reduced and debrided x 10  to their soft tissue attachment mechanically and with electric , removing all offending nail and debris.  Also, a sterile #15 scalpel was used to trim lesions x 2 down to smooth appearing skin without incident.  Patient relates relief following the procedure. He will continue to monitor the areas daily, inspect his feet, wear protective shoe gear when ambulatory, moisturizer to maintain skin integrity and follow in this office in approximately 4 months, sooner p.r.n.    Alex Olson DPM

## 2024-05-28 ENCOUNTER — OFFICE VISIT (OUTPATIENT)
Dept: OPHTHALMOLOGY | Facility: CLINIC | Age: 74
End: 2024-05-28
Payer: MEDICARE

## 2024-05-28 DIAGNOSIS — H25.12 NUCLEAR SCLEROTIC CATARACT OF LEFT EYE: ICD-10-CM

## 2024-05-28 DIAGNOSIS — H25.11 NUCLEAR SCLEROTIC CATARACT OF RIGHT EYE: Primary | ICD-10-CM

## 2024-05-28 PROCEDURE — 99204 OFFICE O/P NEW MOD 45 MIN: CPT | Mod: S$PBB,,, | Performed by: OPHTHALMOLOGY

## 2024-05-28 PROCEDURE — 99999 PR PBB SHADOW E&M-EST. PATIENT-LVL III: CPT | Mod: PBBFAC,,, | Performed by: OPHTHALMOLOGY

## 2024-05-28 PROCEDURE — 92136 OPHTHALMIC BIOMETRY: CPT | Mod: PBBFAC,PO,RT | Performed by: OPHTHALMOLOGY

## 2024-05-28 PROCEDURE — 99213 OFFICE O/P EST LOW 20 MIN: CPT | Mod: PBBFAC,PO | Performed by: OPHTHALMOLOGY

## 2024-05-28 RX ORDER — PREDNISOLONE/MOXIFLOX/BROMFEN 1 %-0.5 %
1 SUSPENSION, DROPS(FINAL DOSAGE FORM)(ML) OPHTHALMIC (EYE) 3 TIMES DAILY
Qty: 5 ML | Refills: 3 | Status: SHIPPED | OUTPATIENT
Start: 2024-05-28

## 2024-05-28 NOTE — PROGRESS NOTES
HPI    Dr. Rdz    DM2- PATIENT CURRENTLY TAKING GLP-1 AGONIST: Semaglutide (Ozempic)   Cataracts OU  Amblyopia OD  Anatomic narrow angles OU  H/o eyelid sx.    Patient here for cataract evaluation. Patient states decrease in vision OS   >OD and was told he had cataracts. Feels like he has to constantly clean   his glasses because they are dirty. No glare complaints.   Patient denies diplopia, headaches, flashes/floaters, and pain.        Last edited by Radha Bedoya MD on 5/28/2024  2:34 PM.            Assessment /Plan     For exam results, see Encounter Report.    Nuclear sclerotic cataract of right eye  -     IOL Master - OD - Right Eye    Nuclear sclerotic cataract of left eye      Visually significant nuclear sclerotic cataract in both eyes    - Cataracts are interfering with activities of daily living, including night time driving.  - Pt desires cataract surgery for visual rehabilitation.   - risks / benefits/ alternatives were discussed and patient agrees to proceed with surgery.   - IOL options discussed as well, according to patient's goals and concomitant ocular pathology.  - Target: plano.      DIBOO 22.0 OS range  Ora - n/c    DIBOO 20.5 OD    Has high cyl - defers toric.       DM2- PATIENT CURRENTLY TAKING GLP-1 AGONIST: Semaglutide (Ozempic)     Amblyopia OD    Anatomic narrow angles OU    H/o eyelid sx.

## 2024-05-30 DIAGNOSIS — E11.42 TYPE 2 DIABETES MELLITUS WITH DIABETIC POLYNEUROPATHY, WITHOUT LONG-TERM CURRENT USE OF INSULIN: ICD-10-CM

## 2024-05-30 RX ORDER — GLIPIZIDE 10 MG/1
10 TABLET, FILM COATED, EXTENDED RELEASE ORAL
Qty: 90 TABLET | Refills: 1 | Status: SHIPPED | OUTPATIENT
Start: 2024-05-30

## 2024-05-30 NOTE — TELEPHONE ENCOUNTER
Refill Decision Note   Tal Joel  is requesting a refill authorization.  Brief Assessment and Rationale for Refill:  Approve     Medication Therapy Plan:         Comments:     Note composed:6:41 PM 05/30/2024

## 2024-05-30 NOTE — TELEPHONE ENCOUNTER
No care due was identified.  [facility] Embedded Care Due Messages. Reference number: [id number].   5/30/2024 7:08:43 AM CDT

## 2024-05-31 ENCOUNTER — TELEPHONE (OUTPATIENT)
Dept: OPHTHALMOLOGY | Facility: CLINIC | Age: 74
End: 2024-05-31
Payer: MEDICARE

## 2024-05-31 NOTE — TELEPHONE ENCOUNTER
----- Message from Kath Finley sent at 5/31/2024  8:06 AM CDT -----  Consult/Advisory    Name Of Caller:Mrs Maldonado      Contact Preference:[phone]    Nature of call: Ptn wife call regarding second eye sx she stated they need to reschedule please call wife

## 2024-06-24 DIAGNOSIS — L40.9 PSORIASIS: ICD-10-CM

## 2024-06-24 RX ORDER — TRIAMCINOLONE ACETONIDE 1 MG/G
CREAM TOPICAL 2 TIMES DAILY
Qty: 453.6 G | Refills: 0 | Status: SHIPPED | OUTPATIENT
Start: 2024-06-24

## 2024-06-24 NOTE — TELEPHONE ENCOUNTER
No care due was identified.  [facility] Embedded Care Due Messages. Reference number: [id number].   6/24/2024 4:12:32 PM CDT

## 2024-06-25 NOTE — TELEPHONE ENCOUNTER
Refill Decision Note   Tal Joel  is requesting a refill authorization.  Brief Assessment and Rationale for Refill:  Approve     Medication Therapy Plan:         Comments:     Note composed:7:05 PM 06/24/2024

## 2024-07-09 ENCOUNTER — TELEPHONE (OUTPATIENT)
Dept: OPHTHALMOLOGY | Facility: CLINIC | Age: 74
End: 2024-07-09
Payer: MEDICARE

## 2024-07-09 DIAGNOSIS — E11.42 TYPE 2 DIABETES MELLITUS WITH DIABETIC POLYNEUROPATHY, WITHOUT LONG-TERM CURRENT USE OF INSULIN: ICD-10-CM

## 2024-07-09 RX ORDER — DAPAGLIFLOZIN 5 MG/1
5 TABLET, FILM COATED ORAL
Qty: 90 TABLET | Refills: 1 | Status: SHIPPED | OUTPATIENT
Start: 2024-07-09 | End: 2024-07-10 | Stop reason: SDUPTHER

## 2024-07-09 NOTE — TELEPHONE ENCOUNTER
No care due was identified.  [facility] Embedded Care Due Messages. Reference number: [id number].   7/09/2024 3:45:22 PM CDT

## 2024-07-10 DIAGNOSIS — E11.42 TYPE 2 DIABETES MELLITUS WITH DIABETIC POLYNEUROPATHY, WITHOUT LONG-TERM CURRENT USE OF INSULIN: ICD-10-CM

## 2024-07-10 NOTE — TELEPHONE ENCOUNTER
Refill Decision Note   Tal Joel  is requesting a refill authorization.    Brief Assessment and Rationale for Refill:   Approve       Medication Therapy Plan:         Comments:     Note composed:10:29 PM 07/09/2024

## 2024-07-10 NOTE — TELEPHONE ENCOUNTER
No care due was identified.  [facility] Embedded Care Due Messages. Reference number: [id number].   7/10/2024 9:07:26 AM CDT

## 2024-07-11 DIAGNOSIS — E11.42 TYPE 2 DIABETES MELLITUS WITH DIABETIC POLYNEUROPATHY, WITHOUT LONG-TERM CURRENT USE OF INSULIN: ICD-10-CM

## 2024-07-11 RX ORDER — METFORMIN HYDROCHLORIDE 1000 MG/1
1000 TABLET ORAL 2 TIMES DAILY WITH MEALS
Qty: 180 TABLET | Refills: 1 | Status: SHIPPED | OUTPATIENT
Start: 2024-07-11

## 2024-07-11 RX ORDER — DAPAGLIFLOZIN 5 MG/1
5 TABLET, FILM COATED ORAL DAILY
Qty: 90 TABLET | Refills: 3 | Status: SHIPPED | OUTPATIENT
Start: 2024-07-11 | End: 2025-07-11

## 2024-07-11 RX ORDER — METFORMIN HYDROCHLORIDE 1000 MG/1
1000 TABLET ORAL 2 TIMES DAILY WITH MEALS
Qty: 180 TABLET | Refills: 3 | Status: SHIPPED | OUTPATIENT
Start: 2024-07-11 | End: 2024-07-11 | Stop reason: CLARIF

## 2024-07-11 NOTE — TELEPHONE ENCOUNTER
Refill Decision Note   Tal Joel  is requesting a refill authorization.  Brief Assessment and Rationale for Refill:  Approve     Medication Therapy Plan:         Comments:     Note composed:3:42 PM 07/11/2024

## 2024-07-11 NOTE — TELEPHONE ENCOUNTER
No care due was identified.  [facility] Embedded Care Due Messages. Reference number: [id number].   7/11/2024 10:41:39 AM CDT

## 2024-07-15 ENCOUNTER — TELEPHONE (OUTPATIENT)
Dept: OPHTHALMOLOGY | Facility: CLINIC | Age: 74
End: 2024-07-15
Payer: MEDICARE

## 2024-07-15 NOTE — H&P
History    Chief complaint:  Painless progressive vision loss    Present Ilness/Diagnosis: Nuclear sclerotic Cataract    Past Medical History:  has a past medical history of Abnormal results of liver function studies  (09/23/2020), Amblyopia, Anatomical narrow angle of both eyes, Arthritis of hand, BPH (benign prostatic hyperplasia), Cataract, Colon polyps, COPD (chronic obstructive pulmonary disease), Coronary artery disease, Diabetes mellitus, Elevated liver enzymes (09/23/2020), Hypertension, WINSOME on CPAP, Other hyperlipidemia, Sensorineural hearing loss (SNHL) of left ear, Superficial thrombophlebitis, and Superficial thrombophlebitis.    Family History/Social History: refer to chart    Allergies: Review of patient's allergies indicates:  No Known Allergies    Current Medications: No current facility-administered medications for this encounter.    Current Outpatient Medications:     allopurinoL (ZYLOPRIM) 100 MG tablet, Take 2 tablets (200 mg total) by mouth once daily., Disp: 180 tablet, Rfl: 3    amLODIPine (NORVASC) 10 MG tablet, TAKE 1 TABLET(10 MG) BY MOUTH EVERY DAY, Disp: 90 tablet, Rfl: 2    aspirin (ECOTRIN) 81 MG EC tablet, Take 1 tablet (81 mg total) by mouth once daily., Disp: , Rfl: 0    atorvastatin (LIPITOR) 80 MG tablet, TAKE 1 TABLET(80 MG) BY MOUTH EVERY EVENING, Disp: 90 tablet, Rfl: 3    blood sugar diagnostic Strp, To check BG 2 times daily, to use with insurance preferred meter, Disp: 100 each, Rfl: 3    blood-glucose meter kit, To check BG 2 times daily, to use with insurance preferred meter, Disp: 1 each, Rfl: 0    calcipotriene-betamethasone 0.005-0.064 % Crea, Apply 1 application topically once daily at 6am., Disp: 60 g, Rfl: 3    clotrimazole-betamethasone 1-0.05% (LOTRISONE) cream, Apply topically 2 (two) times daily., Disp: , Rfl:     dapagliflozin propanediol (FARXIGA) 5 mg Tab tablet, Take 1 tablet (5 mg total) by mouth once daily., Disp: 90 tablet, Rfl: 3    finasteride (PROSCAR) 5  "mg tablet, Take 1 tablet (5 mg total) by mouth once daily., Disp: 90 tablet, Rfl: 3    FREESTYLE LITE STRIPS Strp, USE AS DIRECTED TO TEST ONCE DAILY, Disp: 100 strip, Rfl: 1    glipiZIDE (GLUCOTROL) 10 MG TR24, TAKE 1 TABLET(10 MG) BY MOUTH DAILY WITH BREAKFAST, Disp: 90 tablet, Rfl: 1    glucosamine-chondroitin 500-400 mg tablet, Take 1 tablet by mouth 3 (three) times daily., Disp: , Rfl:     indomethacin (INDOCIN) 50 MG capsule, Take 1 capsule (50 mg total) by mouth 2 (two) times daily as needed (gout flare)., Disp: 30 capsule, Rfl: 1    lancets (ACCU-CHEK FASTCLIX LANCET DRUM) Misc, Inject 1 Units into the skin 2 (two) times a day., Disp: 200 each, Rfl: 11    LANCETS MISC, by Misc.(Non-Drug; Combo Route) route., Disp: , Rfl:     levothyroxine (SYNTHROID) 150 MCG tablet, Take 1 tablet (150 mcg total) by mouth before breakfast., Disp: 90 tablet, Rfl: 3    lisinopriL-hydrochlorothiazide (PRINZIDE,ZESTORETIC) 20-12.5 mg per tablet, Take 1 tablet by mouth 2 (two) times a day. TAKE 1 TABLET BY MOUTH TWICE DAILY, Disp: 180 tablet, Rfl: 3    metFORMIN (GLUCOPHAGE) 1000 MG tablet, Take 1 tablet (1,000 mg total) by mouth 2 (two) times daily with meals., Disp: 180 tablet, Rfl: 1    metoprolol succinate (TOPROL-XL) 200 MG 24 hr tablet, TAKE 1 TABLET(200 MG) BY MOUTH EVERY DAY, Disp: 90 tablet, Rfl: 1    omega-3 fatty acids/fish oil (FISH OIL-OMEGA-3 FATTY ACIDS) 300-1,000 mg capsule, Take by mouth once daily., Disp: , Rfl:     pen needle, diabetic (BD NIC 2ND GEN PEN NEEDLE) 32 gauge x 5/32" Ndle, USE ONCE DAILY AS DIRECTED, Disp: 100 each, Rfl: 3    prednisoLONE-moxiflox-bromfen 1-0.5-0.075 % DrpS, Apply 1 drop to eye 3 (three) times daily., Disp: 5 mL, Rfl: 3    prednisoLONE-moxiflox-bromfen 1-0.5-0.075 % DrpS, Apply 1 drop to eye 3 (three) times daily., Disp: 5 mL, Rfl: 3    semaglutide (OZEMPIC) 1 mg/dose (4 mg/3 mL), Inject 1 mg into the skin every 7 days., Disp: 3 mL, Rfl: 11    tamsulosin (FLOMAX) 0.4 mg Cap, Take 1 " capsule (0.4 mg total) by mouth once daily., Disp: 90 capsule, Rfl: 3    triamcinolone acetonide 0.1% (KENALOG) 0.1 % cream, Apply topically 2 (two) times daily., Disp: 453.6 g, Rfl: 0    ASA Score: II     Mallampati Score: II     Plan for Sedation: Moderate Sedation     Patient or Family History of Anesthesia problems : No    Physical Exam    BP: Vital signs stable  General: No apparent distress  HEENT: nuclear sclerotic cataract  Lungs: adequate respirations  Heart: + pulses  Abdomen: soft  Rectal/pelvic: deferred    Impression: Visually significant Cataract.    See previous clinic notes for surgical indications.    Plan: Phacoemulsification with implantation of Intraocular lens

## 2024-07-15 NOTE — TELEPHONE ENCOUNTER
Spoke with pt provided arrival time of 8:00 for sx on 7/17/24 with Dr. Bedoya @ [place]. Pt has eyedrops and packet.

## 2024-07-17 ENCOUNTER — HOSPITAL ENCOUNTER (OUTPATIENT)
Facility: HOSPITAL | Age: 74
Discharge: HOME | End: 2024-07-17
Attending: OPHTHALMOLOGY | Admitting: OPHTHALMOLOGY
Payer: MEDICARE

## 2024-07-17 VITALS
DIASTOLIC BLOOD PRESSURE: 79 MMHG | HEART RATE: 77 BPM | RESPIRATION RATE: 14 BRPM | OXYGEN SATURATION: 94 % | TEMPERATURE: 98 F | SYSTOLIC BLOOD PRESSURE: 173 MMHG

## 2024-07-17 DIAGNOSIS — H25.12 AGE-RELATED NUCLEAR CATARACT, LEFT: ICD-10-CM

## 2024-07-17 DIAGNOSIS — H25.12 NUCLEAR SCLEROTIC CATARACT OF LEFT EYE: Primary | ICD-10-CM

## 2024-07-17 LAB — POCT GLUCOSE: 208 MG/DL (ref 70–110)

## 2024-07-17 PROCEDURE — 99152 MOD SED SAME PHYS/QHP 5/>YRS: CPT | Mod: ,,, | Performed by: OPHTHALMOLOGY

## 2024-07-17 PROCEDURE — 71000015 HC POSTOP RECOV 1ST HR: Performed by: OPHTHALMOLOGY

## 2024-07-17 PROCEDURE — V2632 POST CHMBR INTRAOCULAR LENS: HCPCS | Performed by: OPHTHALMOLOGY

## 2024-07-17 PROCEDURE — 36000706: Performed by: OPHTHALMOLOGY

## 2024-07-17 PROCEDURE — 94761 N-INVAS EAR/PLS OXIMETRY MLT: CPT

## 2024-07-17 PROCEDURE — 82962 GLUCOSE BLOOD TEST: CPT | Performed by: OPHTHALMOLOGY

## 2024-07-17 PROCEDURE — 25000003 PHARM REV CODE 250: Performed by: OPHTHALMOLOGY

## 2024-07-17 PROCEDURE — 99900035 HC TECH TIME PER 15 MIN (STAT)

## 2024-07-17 PROCEDURE — 63600175 PHARM REV CODE 636 W HCPCS: Performed by: OPHTHALMOLOGY

## 2024-07-17 PROCEDURE — 27201423 OPTIME MED/SURG SUP & DEVICES STERILE SUPPLY: Performed by: OPHTHALMOLOGY

## 2024-07-17 PROCEDURE — 36000707: Performed by: OPHTHALMOLOGY

## 2024-07-17 PROCEDURE — 66982 XCAPSL CTRC RMVL CPLX WO ECP: CPT | Mod: LT,,, | Performed by: OPHTHALMOLOGY

## 2024-07-17 PROCEDURE — 99152 MOD SED SAME PHYS/QHP 5/>YRS: CPT | Performed by: OPHTHALMOLOGY

## 2024-07-17 DEVICE — LENS EYHANCE +22.0D: Type: IMPLANTABLE DEVICE | Site: EYE | Status: FUNCTIONAL

## 2024-07-17 RX ORDER — TROPICAMIDE 10 MG/ML
1 SOLUTION/ DROPS OPHTHALMIC
Status: COMPLETED | OUTPATIENT
Start: 2024-07-17 | End: 2024-07-17

## 2024-07-17 RX ORDER — PROPARACAINE HYDROCHLORIDE 5 MG/ML
SOLUTION/ DROPS OPHTHALMIC
Status: DISCONTINUED | OUTPATIENT
Start: 2024-07-17 | End: 2024-07-17 | Stop reason: HOSPADM

## 2024-07-17 RX ORDER — TETRACAINE HYDROCHLORIDE 5 MG/ML
1 SOLUTION OPHTHALMIC EVERY 5 MIN PRN
Status: COMPLETED | OUTPATIENT
Start: 2024-07-17 | End: 2024-07-17

## 2024-07-17 RX ORDER — LIDOCAINE HYDROCHLORIDE 40 MG/ML
INJECTION, SOLUTION RETROBULBAR
Status: DISCONTINUED | OUTPATIENT
Start: 2024-07-17 | End: 2024-07-17 | Stop reason: HOSPADM

## 2024-07-17 RX ORDER — PHENYLEPHRINE HYDROCHLORIDE 100 MG/ML
1 SOLUTION/ DROPS OPHTHALMIC
Status: DISCONTINUED | OUTPATIENT
Start: 2024-07-17 | End: 2024-07-17 | Stop reason: HOSPADM

## 2024-07-17 RX ORDER — LIDOCAINE HYDROCHLORIDE 10 MG/ML
INJECTION, SOLUTION EPIDURAL; INFILTRATION; INTRACAUDAL; PERINEURAL
Status: DISCONTINUED | OUTPATIENT
Start: 2024-07-17 | End: 2024-07-17 | Stop reason: HOSPADM

## 2024-07-17 RX ORDER — PROPARACAINE HYDROCHLORIDE 5 MG/ML
1 SOLUTION/ DROPS OPHTHALMIC
Status: DISCONTINUED | OUTPATIENT
Start: 2024-07-17 | End: 2024-07-17 | Stop reason: HOSPADM

## 2024-07-17 RX ORDER — MOXIFLOXACIN 5 MG/ML
SOLUTION/ DROPS OPHTHALMIC
Status: DISCONTINUED | OUTPATIENT
Start: 2024-07-17 | End: 2024-07-17 | Stop reason: HOSPADM

## 2024-07-17 RX ORDER — ACETAMINOPHEN 325 MG/1
650 TABLET ORAL EVERY 4 HOURS PRN
Status: DISCONTINUED | OUTPATIENT
Start: 2024-07-17 | End: 2024-07-17 | Stop reason: HOSPADM

## 2024-07-17 RX ORDER — MOXIFLOXACIN 5 MG/ML
1 SOLUTION/ DROPS OPHTHALMIC
Status: COMPLETED | OUTPATIENT
Start: 2024-07-17 | End: 2024-07-17

## 2024-07-17 RX ORDER — PREDNISOLONE ACETATE 10 MG/ML
SUSPENSION/ DROPS OPHTHALMIC
Status: DISCONTINUED | OUTPATIENT
Start: 2024-07-17 | End: 2024-07-17 | Stop reason: HOSPADM

## 2024-07-17 RX ORDER — PHENYLEPHRINE HYDROCHLORIDE 25 MG/ML
1 SOLUTION/ DROPS OPHTHALMIC
Status: COMPLETED | OUTPATIENT
Start: 2024-07-17 | End: 2024-07-17

## 2024-07-17 RX ORDER — MIDAZOLAM HYDROCHLORIDE 1 MG/ML
2 INJECTION, SOLUTION INTRAMUSCULAR; INTRAVENOUS
Status: DISCONTINUED | OUTPATIENT
Start: 2024-07-17 | End: 2024-07-17 | Stop reason: HOSPADM

## 2024-07-17 RX ORDER — FENTANYL CITRATE 50 UG/ML
25 INJECTION, SOLUTION INTRAMUSCULAR; INTRAVENOUS EVERY 5 MIN PRN
Status: DISCONTINUED | OUTPATIENT
Start: 2024-07-17 | End: 2024-07-17 | Stop reason: HOSPADM

## 2024-07-17 RX ADMIN — FENTANYL CITRATE 25 MCG: 50 INJECTION, SOLUTION INTRAMUSCULAR; INTRAVENOUS at 09:07

## 2024-07-17 RX ADMIN — TETRACAINE HYDROCHLORIDE 1 DROP: 5 SOLUTION OPHTHALMIC at 08:07

## 2024-07-17 RX ADMIN — MOXIFLOXACIN OPHTHALMIC 1 DROP: 5 SOLUTION/ DROPS OPHTHALMIC at 08:07

## 2024-07-17 RX ADMIN — MIDAZOLAM HYDROCHLORIDE 1 MG: 1 INJECTION, SOLUTION INTRAMUSCULAR; INTRAVENOUS at 09:07

## 2024-07-17 RX ADMIN — TROPICAMIDE 1 DROP: 10 SOLUTION/ DROPS OPHTHALMIC at 08:07

## 2024-07-17 RX ADMIN — PHENYLEPHRINE HYDROCHLORIDE 1 DROP: 25 SOLUTION/ DROPS OPHTHALMIC at 08:07

## 2024-07-17 RX ADMIN — MOXIFLOXACIN 1 DROP: 5 SOLUTION/ DROPS OPHTHALMIC at 10:07

## 2024-07-17 RX ADMIN — MOXIFLOXACIN 1 DROP: 5 SOLUTION/ DROPS OPHTHALMIC at 09:07

## 2024-07-17 NOTE — OP NOTE
DATE OF PROCEDURE: 07/17/2024    SURGEON: KEYSHA SIFUENTES MD    PREOPERATIVE DIAGNOSIS:  1. Senile nuclear sclerotic cataract left eye. 2. Poor mydriasis secondary to floppy iris syndrome left eye    POSTOPERATIVE DIAGNOSIS: 1.Senile nuclear sclerotic cataract left eye. 2. Poor mydriasis secondary to floppy iris syndrome left eye    PROCEDURE PERFORMED:  Complex phacoemulsification with placement of intraocular lens, left eye, with placement of a Malyugin ring.    IMPLANT:  DIB00 22.0    Anesthesia: Moderate sedation with topical Lidocaine.     Patient ID confirmed and re-evaluated the patient and anesthesia plan confirming it is suitable for the patient's condition and procedure.     COMPLICATIONS: none    ESTIMATED BLOOD LOSS: <1cc    SPECIMENS: none    INDICATIONS FOR PROCEDURE:   The patient has a history of painless progressive vision loss.  The patient has described difficulties with activities of daily living, which specifically include driving, which is secondary to cataract formation and progression. After we had a thorough discussion about risks, benefits, and alternatives to cataract surgery, the patient agreed to proceed with phacoemulsification and implantation of a lens in the left eye.  These risks include, but are not limited to, hemorrhage, pain, infection, need for additional surgery, need for glasses or contacts, loss of vision, or even loss of the eye.      PROCEDURE IN DETAIL:  The patient was met in the preop holding area.  Consent was confirmed to be signed.  The operative site was marked.  The patient was brought into the operating room by the anesthesia team and placed under monitored anesthesia care.  The left eye was prepped and draped in a sterile ophthalmic fashion.  A Jose speculum was placed into the left eye.   A paracentesis site was made and 1% preservative-free lidocaine was injected into the anterior chamber.  Viscoelastic  material was injected into the anterior chamber.  A  keratome blade was used to make a clear corneal incision. The malyugin ring was inserted and positioned into place, assisting with pupillary dilation.  A cystotome was used to initiate the continuous curvilinear capsulorrhexis which was completed with Utrata forceps.  BSS on a salazar cannula was used to perform hydrodissection.  The phacoemulsification tip was introduced into the eye and the nucleus was removed in a standard divide-and-conquer fashion.  Remaining cortical material was removed from the eye using irrigation-aspiration.  The capsular bag was filled with viscoelastic material and the intraocular lens was injected and positioned into place. The Malyugin ring was removed from the eye. Remaining viscoelastic material was removed from the eye using irrigation and aspiration.  The corneal wounds were hydrated.  The eye was filled to physiologic pressure. The wounds were found to be watertight. Drops of Vigamox and prednisilone were placed into the eye.  The eye was washed, dried, and shielded.  The patient tolerated the procedure well and knows to follow up with me tomorrow morning, sooner if needed.      Under my direct supervision, intravenous moderate sedation was administered during the course of this procedure, with continuous monitoring of hemodynamic parameters.  Monitoring of the patient's vital signs and respiratory status was provided by trained nursing staff during the entire course of the procedure and under my supervision and recorded in the patient's medical record.  The total time for sedation was 11 minutes.

## 2024-07-17 NOTE — PLAN OF CARE
Pt in preop bay 35, VSS, meds given and IV inserted. Pt denies any open wounds on body or the use of any weight loss injections. Pt needs ready to roll.   Procedural consents verified with pt.

## 2024-07-17 NOTE — DISCHARGE INSTRUCTIONS
Dr. Bedoya     Cataract Post-Operative Instructions       Day of surgery:     -Resume drops THREE times daily into the operative eye.     -Do not rub your eye     -Wear protective sunglasses during the day.     -Resume moderate activity.     -Bathe/shower/wash face normally     -Do not apply makeup around the operative eye for 1 week.     -You should expect:     Blurry vision and halos for 24-48 hours     Dilated pupil for 24-48 hours     Scratchy feeling in the eye for 1-2 days     Curved shadow in your peripheral vision for 2-3 weeks     Occasional flickering of lights for up to 1 week     -If you experience severe pain or nausea, call Dr. Bedoya or the on-call doctor at [phone].       Plan to see Dr. Bedoya at:     [facility] [address]     [address] [place] [address]    **Most patients can drive the next morning.  If you do not feel comfortable driving, please arrange for transportation. **

## 2024-07-17 NOTE — DISCHARGE SUMMARY
[facility]  Discharge Note  Short Stay    Procedure(s) (LRB):  EXTRACTION, CATARACT, WITH IOL INSERTION (Left)  BRIEF DISCHARGE NOTE:    Date of discharge: 07/17/2024    Reason for hospitalization -  Cataract surgery     Final Diagnosis - Visually significant Cataract    Procedures and treatment provided - Status post phacoemulsification with placement of intraocular lens     Diet - Advance to regular as tolerated    Activity - as tolerated    Disposition at the end of the case - Good.    Discharge: to home    The patient tolerated the procedure well and knows to follow up with me tomorrow in the eye clinic, sooner if needed.    Patient and family instructions (as appropriate) - Given to patient on discharge    Radha Bedoya MD

## 2024-07-18 ENCOUNTER — OFFICE VISIT (OUTPATIENT)
Dept: OPHTHALMOLOGY | Facility: CLINIC | Age: 74
End: 2024-07-18
Payer: MEDICARE

## 2024-07-18 DIAGNOSIS — Z98.42 STATUS POST CATARACT EXTRACTION AND INSERTION OF INTRAOCULAR LENS, LEFT: Primary | ICD-10-CM

## 2024-07-18 DIAGNOSIS — H25.11 NUCLEAR SCLEROSIS OF RIGHT EYE: ICD-10-CM

## 2024-07-18 DIAGNOSIS — Z96.1 STATUS POST CATARACT EXTRACTION AND INSERTION OF INTRAOCULAR LENS, LEFT: Primary | ICD-10-CM

## 2024-07-18 PROCEDURE — 99024 POSTOP FOLLOW-UP VISIT: CPT | Mod: POP,,, | Performed by: OPHTHALMOLOGY

## 2024-07-18 PROCEDURE — 99999 PR PBB SHADOW E&M-EST. PATIENT-LVL III: CPT | Mod: PBBFAC,,, | Performed by: OPHTHALMOLOGY

## 2024-07-18 PROCEDURE — 99213 OFFICE O/P EST LOW 20 MIN: CPT | Mod: PBBFAC | Performed by: OPHTHALMOLOGY

## 2024-07-21 NOTE — PROGRESS NOTES
HPI    Dr. Rdz  S/o Phaco OS 07/17/20274  DM2- PATIENT CURRENTLY TAKING GLP-1 AGONIST: Semaglutide (Ozempic)   Cataracts OD  Amblyopia OD  Anatomic narrow angles OU  H/o eyelid sx.    Patient here 1 day post op OS. Patient stated his vision in OS is good.   Patient denies pain.        Last edited by Alberto Carrion MA on 7/18/2024  2:12 PM.            Assessment /Plan     For exam results, see Encounter Report.    Status post cataract extraction and insertion of intraocular lens, left    Nuclear sclerosis of right eye                       Slit Lamp Exam  L/L - normal  C/s - quiet  Cornea - clear  A/C - 1+ cell  Lens - PCIOL    POD #1 s/p phaco/IOL  - doing well  - continue the following drops:    vigamox or ocuflox TID x 1 wk then stop  Pred forte TID x  4 wks  Ketorolac TID until runs out    Versus:    Combination drop - 1 drop TID x total of 1 month    Appropriate precautions and post op medications reviewed.  Patient instructed to call or come in if symptoms of redness, decreased vision, or pain are experienced.    -f/up 1-2 wks, sooner PRN. Or 4 wks with optom for mrx if needed.

## 2024-07-24 ENCOUNTER — TELEPHONE (OUTPATIENT)
Dept: OPHTHALMOLOGY | Facility: CLINIC | Age: 74
End: 2024-07-24
Payer: MEDICARE

## 2024-07-24 ENCOUNTER — OFFICE VISIT (OUTPATIENT)
Dept: OPTOMETRY | Facility: CLINIC | Age: 74
End: 2024-07-24
Payer: MEDICARE

## 2024-07-24 DIAGNOSIS — H53.001 AMBLYOPIA OF RIGHT EYE: ICD-10-CM

## 2024-07-24 DIAGNOSIS — Z98.890 POST-OPERATIVE STATE: Primary | ICD-10-CM

## 2024-07-24 PROCEDURE — 99213 OFFICE O/P EST LOW 20 MIN: CPT | Mod: PBBFAC,PO | Performed by: OPTOMETRIST

## 2024-07-24 PROCEDURE — 99999 PR PBB SHADOW E&M-EST. PATIENT-LVL III: CPT | Mod: PBBFAC,,, | Performed by: OPTOMETRIST

## 2024-07-24 PROCEDURE — 99024 POSTOP FOLLOW-UP VISIT: CPT | Mod: POP,,, | Performed by: OPTOMETRIST

## 2024-07-24 NOTE — PROGRESS NOTES
HPI    1 week S/o Phaco OS 07/17/20274    Pt denies any eye pain. Happy with va. Using combo gtt TID OS. No flashes   or floaters.   Last edited by Harriet Sher on 7/24/2024 10:05 AM.            Assessment /Plan     For exam results, see Encounter Report.    Post-operative state    Amblyopia of right eye      Doing well 1 week post op cataract left eye, cont drops, iop fine, happy with vision, rtc 2 weeks surgery left eye.

## 2024-07-29 NOTE — TELEPHONE ENCOUNTER
Care Due:                  Date            Visit Type   Department     Provider  --------------------------------------------------------------------------------                                EP -                              [facility]  Last Visit: 05-      CARE ([facility])   WAYNE Iglesias                               -                              [facility]  Next Visit: 11-      CARE ([facility])   MEDICINE       Marlon Iglesias                                                            Last  Test          Frequency    Reason                     Performed    Due Date  --------------------------------------------------------------------------------    HBA1C.......  6 months...  dapagliflozin, glipiZIDE,   04-   10-                             metFORMIN, semaglutide...    [employer] Embedded Care Due Messages. Reference number: [id number].   7/29/2024 10:32:51 AM CDT

## 2024-07-30 RX ORDER — BLOOD-GLUCOSE METER
KIT MISCELLANEOUS
Qty: 100 STRIP | Refills: 3 | Status: SHIPPED | OUTPATIENT
Start: 2024-07-30

## 2024-07-30 NOTE — TELEPHONE ENCOUNTER
Provider Staff:  Action required for this patient    Requires labs      Please see care gap opportunities below in Care Due Message.    Thanks!  [facility]     Appointments      Date Provider   Last Visit   5/6/2024 Marlon Iglesias MD   Next Visit   11/6/2024 Marlon Iglesias MD     Refill Decision Note   Tal Maldonado  is requesting a refill authorization.  Brief Assessment and Rationale for Refill:  Approve     Medication Therapy Plan:         Comments:     Note composed:7:45 AM 07/30/2024

## 2024-08-02 ENCOUNTER — TELEPHONE (OUTPATIENT)
Dept: OPHTHALMOLOGY | Facility: CLINIC | Age: 74
End: 2024-08-02
Payer: MEDICARE

## 2024-08-02 NOTE — H&P
History    Chief complaint:  Painless progressive vision loss    Present Ilness/Diagnosis: Nuclear sclerotic Cataract    Past Medical History:  has a past medical history of Abnormal results of liver function studies  (09/23/2020), Amblyopia, Anatomical narrow angle of both eyes, Arthritis of hand, BPH (benign prostatic hyperplasia), Cataract, Colon polyps, COPD (chronic obstructive pulmonary disease), Coronary artery disease, Diabetes mellitus, Elevated liver enzymes (09/23/2020), Hypertension, WINSOME on CPAP, Other hyperlipidemia, Sensorineural hearing loss (SNHL) of left ear, Superficial thrombophlebitis, and Superficial thrombophlebitis.    Family History/Social History: refer to chart    Allergies: Review of patient's allergies indicates:  No Known Allergies    Current Medications: No current facility-administered medications for this encounter.    Current Outpatient Medications:     allopurinoL (ZYLOPRIM) 100 MG tablet, Take 2 tablets (200 mg total) by mouth once daily., Disp: 180 tablet, Rfl: 3    amLODIPine (NORVASC) 10 MG tablet, TAKE 1 TABLET(10 MG) BY MOUTH EVERY DAY, Disp: 90 tablet, Rfl: 2    aspirin (ECOTRIN) 81 MG EC tablet, Take 1 tablet (81 mg total) by mouth once daily., Disp: , Rfl: 0    atorvastatin (LIPITOR) 80 MG tablet, TAKE 1 TABLET(80 MG) BY MOUTH EVERY EVENING, Disp: 90 tablet, Rfl: 3    blood-glucose meter kit, To check BG 2 times daily, to use with insurance preferred meter, Disp: 1 each, Rfl: 0    calcipotriene-betamethasone 0.005-0.064 % Crea, Apply 1 application topically once daily at 6am., Disp: 60 g, Rfl: 3    clotrimazole-betamethasone 1-0.05% (LOTRISONE) cream, Apply topically 2 (two) times daily., Disp: , Rfl:     dapagliflozin propanediol (FARXIGA) 5 mg Tab tablet, Take 1 tablet (5 mg total) by mouth once daily., Disp: 90 tablet, Rfl: 3    finasteride (PROSCAR) 5 mg tablet, Take 1 tablet (5 mg total) by mouth once daily., Disp: 90 tablet, Rfl: 3    FREESTYLE LITE STRIPS Strp, USE  "AS DIRECTED TO TEST ONCE DAILY, Disp: 100 strip, Rfl: 3    glipiZIDE (GLUCOTROL) 10 MG TR24, TAKE 1 TABLET(10 MG) BY MOUTH DAILY WITH BREAKFAST, Disp: 90 tablet, Rfl: 1    glucosamine-chondroitin 500-400 mg tablet, Take 1 tablet by mouth 3 (three) times daily., Disp: , Rfl:     indomethacin (INDOCIN) 50 MG capsule, Take 1 capsule (50 mg total) by mouth 2 (two) times daily as needed (gout flare)., Disp: 30 capsule, Rfl: 1    lancets (ACCU-CHEK FASTCLIX LANCET DRUM) Misc, Inject 1 Units into the skin 2 (two) times a day., Disp: 200 each, Rfl: 11    LANCETS MISC, by Misc.(Non-Drug; Combo Route) route., Disp: , Rfl:     levothyroxine (SYNTHROID) 150 MCG tablet, Take 1 tablet (150 mcg total) by mouth before breakfast., Disp: 90 tablet, Rfl: 3    lisinopriL-hydrochlorothiazide (PRINZIDE,ZESTORETIC) 20-12.5 mg per tablet, Take 1 tablet by mouth 2 (two) times a day. TAKE 1 TABLET BY MOUTH TWICE DAILY, Disp: 180 tablet, Rfl: 3    metFORMIN (GLUCOPHAGE) 1000 MG tablet, Take 1 tablet (1,000 mg total) by mouth 2 (two) times daily with meals., Disp: 180 tablet, Rfl: 1    metoprolol succinate (TOPROL-XL) 200 MG 24 hr tablet, TAKE 1 TABLET(200 MG) BY MOUTH EVERY DAY, Disp: 90 tablet, Rfl: 1    omega-3 fatty acids/fish oil (FISH OIL-OMEGA-3 FATTY ACIDS) 300-1,000 mg capsule, Take by mouth once daily., Disp: , Rfl:     pen needle, diabetic (BD NIC 2ND GEN PEN NEEDLE) 32 gauge x 5/32" Ndle, USE ONCE DAILY AS DIRECTED, Disp: 100 each, Rfl: 3    prednisoLONE-moxiflox-bromfen 1-0.5-0.075 % DrpS, Apply 1 drop to eye 3 (three) times daily., Disp: 5 mL, Rfl: 3    prednisoLONE-moxiflox-bromfen 1-0.5-0.075 % DrpS, Apply 1 drop to eye 3 (three) times daily., Disp: 5 mL, Rfl: 3    semaglutide (OZEMPIC) 1 mg/dose (4 mg/3 mL), Inject 1 mg into the skin every 7 days., Disp: 3 mL, Rfl: 11    tamsulosin (FLOMAX) 0.4 mg Cap, Take 1 capsule (0.4 mg total) by mouth once daily., Disp: 90 capsule, Rfl: 3    triamcinolone acetonide 0.1% (KENALOG) 0.1 % " cream, Apply topically 2 (two) times daily., Disp: 453.6 g, Rfl: 0    ASA Score: II     Mallampati Score: II     Plan for Sedation: Moderate Sedation     Patient or Family History of Anesthesia problems : No    Physical Exam    BP: Vital signs stable  General: No apparent distress  HEENT: nuclear sclerotic cataract  Lungs: adequate respirations  Heart: + pulses  Abdomen: soft  Rectal/pelvic: deferred    Impression: Visually significant Cataract.    See previous clinic notes for surgical indications.    Plan: Phacoemulsification with implantation of Intraocular lens

## 2024-08-02 NOTE — TELEPHONE ENCOUNTER
Spoke with pt provided arrival time of 7:00 for sx on 8/7/24 with Dr. Bedoya @ [place]. Pt has eyedrops and packet.

## 2024-08-03 DIAGNOSIS — Z71.89 COMPLEX CARE COORDINATION: ICD-10-CM

## 2024-08-04 DIAGNOSIS — I10 ESSENTIAL HYPERTENSION: ICD-10-CM

## 2024-08-04 RX ORDER — AMLODIPINE BESYLATE 10 MG/1
10 TABLET ORAL
Qty: 90 TABLET | Refills: 3 | Status: SHIPPED | OUTPATIENT
Start: 2024-08-04

## 2024-08-06 NOTE — PRE-PROCEDURE INSTRUCTIONS
Patient reviewed on 8/6/2024.  Okay to proceed at [facility]. The following pre-procedure instructions and arrival time have been sent to patient portal for review.  Patient replied to portal message.      Deasandra Parada     Below you will find basic pre-procedure instructions in preparation for your procedure on 8/7/24 with Dr. Bedoya     You should have received your arrival time already from Dr's office.     - Nothing to eat or drink after midnight the night before your procedure until after your procedure, except AM meds with small sips of water.     - HOLD all oral Diabetic medications night before and morning of procedure  - HOLD all Insulin morning of procedure  - HOLD all Fluid pills morning of procedure  - HOLD all non-insulin shots until after surgery (Ozempic, Mounjaro, Trulicity, Victoza, Byetta, Wegovy and Adlyxin) (7 days prior)  - HOLD all vitamins, minerals and herbal supplements morning of procedure   - TAKE all B/P meds, EXCEPT those that contain a fluid pill (ex. Lasix, Hydroclorothiazide/HCTZ, Spirnolactone)  - USE inhalers as needed and bring AM of surgery  - USE EYE DROPS as directed  -TAKE blood thinner meds AM of surgery unless otherwise instructed by your provider to not take     - Shower and wash face with antibacterial soap (ex. Dial) for 3 mins PM prior and AM of surgery  - No powder, lotions, creams, oils, gels, ointments, makeup,  or jewelry    - Wear comfortable clothing (button up shirt)     (Patient is required to have a responsible ride to transport home, ride may not leave while patient is in surgery)     -- [facility], 2nd floor Surgery Center, located   @ [address] [place] [address] Floor Registration        If you have any questions or concerns please feel free to contact your surgeon's office.           Please reply to this message as receipt of delivery.     AL Gómez  [facility]  Pre-Admit - Anesthesia Dept

## 2024-08-07 ENCOUNTER — HOSPITAL ENCOUNTER (OUTPATIENT)
Facility: HOSPITAL | Age: 74
Discharge: HOME | End: 2024-08-07
Attending: OPHTHALMOLOGY | Admitting: OPHTHALMOLOGY
Payer: MEDICARE

## 2024-08-07 VITALS
HEART RATE: 72 BPM | OXYGEN SATURATION: 92 % | HEIGHT: 70 IN | BODY MASS INDEX: 37.94 KG/M2 | WEIGHT: 265 LBS | SYSTOLIC BLOOD PRESSURE: 143 MMHG | DIASTOLIC BLOOD PRESSURE: 70 MMHG | RESPIRATION RATE: 22 BRPM | TEMPERATURE: 98 F

## 2024-08-07 DIAGNOSIS — H25.10 AGE-RELATED NUCLEAR CATARACT: ICD-10-CM

## 2024-08-07 DIAGNOSIS — H25.11 NUCLEAR SCLEROTIC CATARACT OF RIGHT EYE: Primary | ICD-10-CM

## 2024-08-07 LAB — POCT GLUCOSE: 77 MG/DL (ref 70–110)

## 2024-08-07 PROCEDURE — 27201423 OPTIME MED/SURG SUP & DEVICES STERILE SUPPLY: Performed by: OPHTHALMOLOGY

## 2024-08-07 PROCEDURE — V2632 POST CHMBR INTRAOCULAR LENS: HCPCS | Performed by: OPHTHALMOLOGY

## 2024-08-07 PROCEDURE — 99153 MOD SED SAME PHYS/QHP EA: CPT | Performed by: OPHTHALMOLOGY

## 2024-08-07 PROCEDURE — 36000707: Performed by: OPHTHALMOLOGY

## 2024-08-07 PROCEDURE — 99152 MOD SED SAME PHYS/QHP 5/>YRS: CPT | Performed by: OPHTHALMOLOGY

## 2024-08-07 PROCEDURE — 99900035 HC TECH TIME PER 15 MIN (STAT)

## 2024-08-07 PROCEDURE — 36000706: Performed by: OPHTHALMOLOGY

## 2024-08-07 PROCEDURE — 63600175 PHARM REV CODE 636 W HCPCS: Performed by: OPHTHALMOLOGY

## 2024-08-07 PROCEDURE — 25000003 PHARM REV CODE 250: Performed by: OPHTHALMOLOGY

## 2024-08-07 PROCEDURE — 66984 XCAPSL CTRC RMVL W/O ECP: CPT | Mod: 79,RT,, | Performed by: OPHTHALMOLOGY

## 2024-08-07 PROCEDURE — 99152 MOD SED SAME PHYS/QHP 5/>YRS: CPT | Mod: ,,, | Performed by: OPHTHALMOLOGY

## 2024-08-07 PROCEDURE — 94761 N-INVAS EAR/PLS OXIMETRY MLT: CPT

## 2024-08-07 PROCEDURE — 71000015 HC POSTOP RECOV 1ST HR: Performed by: OPHTHALMOLOGY

## 2024-08-07 PROCEDURE — 82962 GLUCOSE BLOOD TEST: CPT | Performed by: OPHTHALMOLOGY

## 2024-08-07 DEVICE — LENS EYHANCE +20.5D: Type: IMPLANTABLE DEVICE | Site: EYE | Status: FUNCTIONAL

## 2024-08-07 RX ORDER — PREDNISOLONE ACETATE 10 MG/ML
SUSPENSION/ DROPS OPHTHALMIC
Status: DISCONTINUED | OUTPATIENT
Start: 2024-08-07 | End: 2024-08-07 | Stop reason: HOSPADM

## 2024-08-07 RX ORDER — PHENYLEPHRINE HYDROCHLORIDE 25 MG/ML
1 SOLUTION/ DROPS OPHTHALMIC
Status: DISCONTINUED | OUTPATIENT
Start: 2024-08-07 | End: 2024-08-07

## 2024-08-07 RX ORDER — LIDOCAIN/PHENYLEPH/BSS NO.2/PF 1 %-1.5 %
SYRINGE (ML) INTRAOCULAR
Status: DISCONTINUED | OUTPATIENT
Start: 2024-08-07 | End: 2024-08-07 | Stop reason: HOSPADM

## 2024-08-07 RX ORDER — TETRACAINE HYDROCHLORIDE 5 MG/ML
1 SOLUTION OPHTHALMIC EVERY 5 MIN PRN
Status: DISCONTINUED | OUTPATIENT
Start: 2024-08-07 | End: 2024-08-07 | Stop reason: HOSPADM

## 2024-08-07 RX ORDER — ACETAMINOPHEN 325 MG/1
650 TABLET ORAL EVERY 4 HOURS PRN
Status: DISCONTINUED | OUTPATIENT
Start: 2024-08-07 | End: 2024-08-07 | Stop reason: HOSPADM

## 2024-08-07 RX ORDER — TROPICAMIDE 10 MG/ML
1 SOLUTION/ DROPS OPHTHALMIC
Status: DISCONTINUED | OUTPATIENT
Start: 2024-08-07 | End: 2024-08-07

## 2024-08-07 RX ORDER — LIDOCAINE HYDROCHLORIDE 10 MG/ML
INJECTION, SOLUTION EPIDURAL; INFILTRATION; INTRACAUDAL; PERINEURAL
Status: DISCONTINUED | OUTPATIENT
Start: 2024-08-07 | End: 2024-08-07 | Stop reason: HOSPADM

## 2024-08-07 RX ORDER — PROPARACAINE HYDROCHLORIDE 5 MG/ML
SOLUTION/ DROPS OPHTHALMIC
Status: DISCONTINUED | OUTPATIENT
Start: 2024-08-07 | End: 2024-08-07 | Stop reason: HOSPADM

## 2024-08-07 RX ORDER — LIDOCAINE HYDROCHLORIDE 40 MG/ML
INJECTION, SOLUTION RETROBULBAR
Status: DISCONTINUED | OUTPATIENT
Start: 2024-08-07 | End: 2024-08-07 | Stop reason: HOSPADM

## 2024-08-07 RX ORDER — MOXIFLOXACIN 5 MG/ML
1 SOLUTION/ DROPS OPHTHALMIC
Status: DISCONTINUED | OUTPATIENT
Start: 2024-08-07 | End: 2024-08-07 | Stop reason: HOSPADM

## 2024-08-07 RX ORDER — MOXIFLOXACIN 5 MG/ML
SOLUTION/ DROPS OPHTHALMIC
Status: DISCONTINUED | OUTPATIENT
Start: 2024-08-07 | End: 2024-08-07 | Stop reason: HOSPADM

## 2024-08-07 RX ORDER — PROPARACAINE HYDROCHLORIDE 5 MG/ML
1 SOLUTION/ DROPS OPHTHALMIC
Status: DISCONTINUED | OUTPATIENT
Start: 2024-08-07 | End: 2024-08-07 | Stop reason: HOSPADM

## 2024-08-07 RX ORDER — CYCLOP/TROP/PROPA/PHEN/KET/WAT 1-1-0.1%
1 DROPS (EA) OPHTHALMIC (EYE) EVERY 5 MIN PRN
Status: COMPLETED | OUTPATIENT
Start: 2024-08-07 | End: 2024-08-07

## 2024-08-07 RX ORDER — FENTANYL CITRATE 50 UG/ML
25 INJECTION, SOLUTION INTRAMUSCULAR; INTRAVENOUS EVERY 5 MIN PRN
Status: DISCONTINUED | OUTPATIENT
Start: 2024-08-07 | End: 2024-08-07 | Stop reason: HOSPADM

## 2024-08-07 RX ORDER — MOXIFLOXACIN 5 MG/ML
1 SOLUTION/ DROPS OPHTHALMIC
Status: COMPLETED | OUTPATIENT
Start: 2024-08-07 | End: 2024-08-07

## 2024-08-07 RX ORDER — MIDAZOLAM HYDROCHLORIDE 1 MG/ML
2 INJECTION, SOLUTION INTRAMUSCULAR; INTRAVENOUS
Status: DISCONTINUED | OUTPATIENT
Start: 2024-08-07 | End: 2024-08-07 | Stop reason: HOSPADM

## 2024-08-07 RX ORDER — PHENYLEPHRINE HYDROCHLORIDE 100 MG/ML
1 SOLUTION/ DROPS OPHTHALMIC
Status: DISCONTINUED | OUTPATIENT
Start: 2024-08-07 | End: 2024-08-07 | Stop reason: HOSPADM

## 2024-08-07 RX ADMIN — Medication 1 DROP: at 07:08

## 2024-08-07 RX ADMIN — FENTANYL CITRATE 25 MCG: 50 INJECTION, SOLUTION INTRAMUSCULAR; INTRAVENOUS at 08:08

## 2024-08-07 RX ADMIN — MOXIFLOXACIN OPHTHALMIC 1 DROP: 5 SOLUTION/ DROPS OPHTHALMIC at 07:08

## 2024-08-07 RX ADMIN — MIDAZOLAM HYDROCHLORIDE 2 MG: 1 INJECTION, SOLUTION INTRAMUSCULAR; INTRAVENOUS at 08:08

## 2024-08-07 NOTE — DISCHARGE INSTRUCTIONS
Dr. Bedoya     Cataract Post-Operative Instructions       Day of surgery:     -Resume drops THREE times daily into the operative eye.     -Do not rub your eye     -Wear protective sunglasses during the day.     -Resume moderate activity.     -Bathe/shower/wash face normally     -Do not apply makeup around the operative eye for 1 week.     -You should expect:     Blurry vision and halos for 24-48 hours     Dilated pupil for 24-48 hours     Scratchy feeling in the eye for 1-2 days     Curved shadow in your peripheral vision for 2-3 weeks     Occasional flickering of lights for up to 1 week     -If you experience severe pain or nausea, call Dr. Bedoya or the on-call doctor at [phone].       Plan to see Dr. Bedoya at:     [facility] [address]    [address] [place]  [address]    **Most patients can drive the next morning.  If you do not feel comfortable driving, please arrange for transportation. **

## 2024-08-07 NOTE — OP NOTE
DATE OF PROCEDURE: 08/07/2024    SURGEON: KEYSHA SIFUENTES MD    PREOPERATIVE DIAGNOSIS:  Senile nuclear sclerotic cataract right eye.     POSTOPERATIVE DIAGNOSIS: Senile nuclear sclerotic cataract right eye.     PROCEDURE PERFORMED:  Phacoemulsification with placement of intraocular lens, right eye.    IMPLANT:  DIBOO  20.5    Anesthesia: Moderate sedation with topical Lidocaine. Patient ID confirmed and re-evaluated the patient and anesthesia plan confirming it is suitable for the patient's condition and procedure.     COMPLICATIONS: none    ESTIMATED BLOOD LOSS: <1cc    SPECIMENS: none    INDICATIONS FOR PROCEDURE:   The patient has a history of painless progressive vision loss.  The patient has described difficulties with activities of daily living, which specifically include driving, which is secondary to cataract formation and progression. After we had a thorough discussion about risks, benefits, and alternatives to cataract surgery, the patient agreed to proceed with phacoemulsification and implantation of a lens in the right eye.  These risks include, but are not limited to, hemorrhage, pain, infection, need for additional surgery, need for glasses or contacts, loss of vision, or even loss of the eye.    PROCEDURE IN DETAIL:  The patient was met in the preop holding area.  Consent was confirmed to be signed.  The operative site was marked.  The patient was brought into the operating room by the anesthesia team and placed under monitored anesthesia care.  The right eye was prepped and draped in a sterile ophthalmic fashion.  A Jose speculum was placed into the right eye.   A paracentesis site was made and 1% preservative-free lidocaine was injected into the anterior chamber.  Viscoelastic  material was injected into the anterior chamber.  A keratome blade was used to make a clear corneal incision.  A cystotome was used to initiate the continuous curvilinear capsulorrhexis which was completed with Utrata  forceps.  BSS on a salazar cannula was used to perform hydrodissection.  The phacoemulsification tip was introduced into the eye and the nucleus was removed in a standard divide-and-conquer fashion.  Remaining cortical material was removed from the eye using irrigation-aspiration.  The capsular bag was filled with viscoelastic material and the intraocular lens was injected and positioned into place. Remaining viscoelastic material was removed from the eye using irrigation and aspiration.  The corneal wounds were hydrated.  The eye was filled to physiologic pressure. The wounds were found to be watertight. Drops of Vigamox and prednisilone were placed into the eye.  The eye was washed, dried, and shielded.  The patient tolerated the procedure well and knows to follow up with me tomorrow morning, sooner if needed.      Under my direct supervision, intravenous moderate sedation was administered during the course of this procedure, with continuous monitoring of hemodynamic parameters.  Monitoring of the patient's vital signs and respiratory status was provided by trained nursing staff during the entire course of the procedure and under my supervision and recorded in the patient's medical record.  The total time for sedation was 15 minutes.

## 2024-08-07 NOTE — DISCHARGE SUMMARY
[facility]  Discharge Note  Short Stay    Procedure(s) (LRB):  EXTRACTION, CATARACT, WITH IOL INSERTION (Right)  BRIEF DISCHARGE NOTE:    Date of discharge: 08/07/2024    Reason for hospitalization -  Cataract surgery     Final Diagnosis - Visually significant Cataract    Procedures and treatment provided - Status post phacoemulsification with placement of intraocular lens     Diet - Advance to regular as tolerated    Activity - as tolerated    Disposition at the end of the case - Good.    Discharge: to home    The patient tolerated the procedure well and knows to follow up with me tomorrow in the eye clinic, sooner if needed.    Patient and family instructions (as appropriate) - Given to patient on discharge    Radha Bedoya MD

## 2024-08-08 ENCOUNTER — OFFICE VISIT (OUTPATIENT)
Dept: OPTOMETRY | Facility: CLINIC | Age: 74
End: 2024-08-08
Payer: MEDICARE

## 2024-08-08 DIAGNOSIS — Z96.1 STATUS POST CATARACT EXTRACTION AND INSERTION OF INTRAOCULAR LENS OF RIGHT EYE: Primary | ICD-10-CM

## 2024-08-08 DIAGNOSIS — Z98.41 STATUS POST CATARACT EXTRACTION AND INSERTION OF INTRAOCULAR LENS OF RIGHT EYE: Primary | ICD-10-CM

## 2024-08-08 PROCEDURE — 99999 PR PBB SHADOW E&M-EST. PATIENT-LVL III: CPT | Mod: PBBFAC,,, | Performed by: OPTOMETRIST

## 2024-08-08 PROCEDURE — 99213 OFFICE O/P EST LOW 20 MIN: CPT | Mod: PBBFAC | Performed by: OPTOMETRIST

## 2024-08-08 PROCEDURE — 99024 POSTOP FOLLOW-UP VISIT: CPT | Mod: POP,,, | Performed by: OPTOMETRIST

## 2024-09-19 ENCOUNTER — OFFICE VISIT (OUTPATIENT)
Dept: OPTOMETRY | Facility: CLINIC | Age: 74
End: 2024-09-19
Payer: MEDICARE

## 2024-09-19 DIAGNOSIS — H52.7 REFRACTIVE ERROR: ICD-10-CM

## 2024-09-19 DIAGNOSIS — Z98.890 POST-OPERATIVE STATE: Primary | ICD-10-CM

## 2024-09-19 DIAGNOSIS — E11.9 TYPE 2 DIABETES MELLITUS WITHOUT RETINOPATHY: ICD-10-CM

## 2024-09-19 DIAGNOSIS — H53.001 AMBLYOPIA OF RIGHT EYE: ICD-10-CM

## 2024-09-19 PROCEDURE — 99213 OFFICE O/P EST LOW 20 MIN: CPT | Mod: PBBFAC,PO | Performed by: OPTOMETRIST

## 2024-09-19 PROCEDURE — 99024 POSTOP FOLLOW-UP VISIT: CPT | Mod: POP,,, | Performed by: OPTOMETRIST

## 2024-09-19 PROCEDURE — 92015 DETERMINE REFRACTIVE STATE: CPT | Mod: ,,, | Performed by: OPTOMETRIST

## 2024-09-19 PROCEDURE — 99999 PR PBB SHADOW E&M-EST. PATIENT-LVL III: CPT | Mod: PBBFAC,,, | Performed by: OPTOMETRIST

## 2024-09-19 NOTE — PROGRESS NOTES
HPI    1 month S/p Phaco OS 07/17/20274                                  OD 08/07/2024  Pt denies any changes since last visit. Wearing readers. Wanting glasses   rx. Denies any eye pain. No gtts.   Last edited by Harriet Sher on 9/19/2024  2:46 PM.            Assessment /Plan     For exam results, see Encounter Report.    Post-operative state    Amblyopia of right eye    Type 2 diabetes mellitus without retinopathy    Refractive error      Doing well post op cataract ou, finished drops, happy with results, RTc 1/26 full eye exam  2. Longstanding since child  3. No diabetic retinopathy, no csme. Return in 1 year for dilated eye exam.     4. New Spectacle Rx given, discussed different options for glasses. RTC 1 year routine eye exam.

## 2024-09-26 ENCOUNTER — OFFICE VISIT (OUTPATIENT)
Dept: PODIATRY | Facility: CLINIC | Age: 74
End: 2024-09-26
Payer: MEDICARE

## 2024-09-26 VITALS — HEIGHT: 70 IN | WEIGHT: 265 LBS | BODY MASS INDEX: 37.94 KG/M2

## 2024-09-26 DIAGNOSIS — Q82.8 POROKERATOSIS: ICD-10-CM

## 2024-09-26 DIAGNOSIS — B35.1 ONYCHOMYCOSIS DUE TO DERMATOPHYTE: ICD-10-CM

## 2024-09-26 DIAGNOSIS — M20.41 HAMMER TOES OF BOTH FEET: ICD-10-CM

## 2024-09-26 DIAGNOSIS — E11.42 DIABETIC POLYNEUROPATHY ASSOCIATED WITH TYPE 2 DIABETES MELLITUS: Primary | ICD-10-CM

## 2024-09-26 DIAGNOSIS — M20.42 HAMMER TOES OF BOTH FEET: ICD-10-CM

## 2024-09-26 PROCEDURE — 11056 PARNG/CUTG B9 HYPRKR LES 2-4: CPT | Mod: PBBFAC,PO | Performed by: PODIATRIST

## 2024-09-26 PROCEDURE — 99999 PR PBB SHADOW E&M-EST. PATIENT-LVL II: CPT | Mod: PBBFAC,,, | Performed by: PODIATRIST

## 2024-09-26 PROCEDURE — 11721 DEBRIDE NAIL 6 OR MORE: CPT | Mod: PBBFAC,PO | Performed by: PODIATRIST

## 2024-09-26 PROCEDURE — 99212 OFFICE O/P EST SF 10 MIN: CPT | Mod: PBBFAC,PO | Performed by: PODIATRIST

## 2024-09-26 NOTE — PROGRESS NOTES
Subjective:      Patient ID: Tal Maldonado is a 74 y.o. male.    Chief Complaint: No chief complaint on file.      Tal is a 74 y.o. male who presents to the clinic for evaluation and treatment of diabetic feet. Tal has a past medical history of Abnormal results of liver function studies  (09/23/2020), Amblyopia, Anatomical narrow angle of both eyes, Arthritis of hand, BPH (benign prostatic hyperplasia), Cataract, Colon polyps, COPD (chronic obstructive pulmonary disease), Coronary artery disease, Diabetes mellitus, Elevated liver enzymes (09/23/2020), Hypertension, WINSOME on CPAP, Other hyperlipidemia, Sensorineural hearing loss (SNHL) of left ear, Superficial thrombophlebitis, and Superficial thrombophlebitis. Patient relates having toenails that are in need of trimming.  Denies being painful with wearing shoe gear.  Has not attempted to treat.  Notes decent control over his blood glucose.  Denies any additional pedal complaints.    PCP: Marlon Iglesias MD    Date Last Seen by PCP: 5/24    Hemoglobin A1C   Date Value Ref Range Status   04/29/2024 7.0 (H) 4.0 - 5.6 % Final     Comment:     ADA Screening Guidelines:  5.7-6.4%  Consistent with prediabetes  >or=6.5%  Consistent with diabetes    High levels of fetal hemoglobin interfere with the HbA1C  assay. Heterozygous hemoglobin variants (HbS, HgC, etc)do  not significantly interfere with this assay.   However, presence of multiple variants may affect accuracy.     11/03/2023 7.1 (H) 4.0 - 5.6 % Final     Comment:     ADA Screening Guidelines:  5.7-6.4%  Consistent with prediabetes  >or=6.5%  Consistent with diabetes    High levels of fetal hemoglobin interfere with the HbA1C  assay. Heterozygous hemoglobin variants (HbS, HgC, etc)do  not significantly interfere with this assay.   However, presence of multiple variants may affect accuracy.     05/01/2023 6.8 (H) 4.0 - 5.6 % Final     Comment:     ADA Screening Guidelines:  5.7-6.4%  Consistent with  prediabetes  >or=6.5%  Consistent with diabetes    High levels of fetal hemoglobin interfere with the HbA1C  assay. Heterozygous hemoglobin variants (HbS, HgC, etc)do  not significantly interfere with this assay.   However, presence of multiple variants may affect accuracy.             Past Medical History:   Diagnosis Date    Abnormal results of liver function studies  09/23/2020    Amblyopia     OD    Anatomical narrow angle of both eyes     Arthritis of hand     BPH (benign prostatic hyperplasia)     Cataract     Colon polyps     COPD (chronic obstructive pulmonary disease)     Coronary artery disease     Diabetes mellitus     Elevated liver enzymes 09/23/2020    Hypertension     WINSOME on CPAP     Other hyperlipidemia     Sensorineural hearing loss (SNHL) of left ear     Superficial thrombophlebitis     Superficial thrombophlebitis        Past Surgical History:   Procedure Laterality Date    APPENDECTOMY      blephroplasty Bilateral 2009    CATARACT EXTRACTION W/  INTRAOCULAR LENS IMPLANT Left 7/17/2024    Procedure: EXTRACTION, CATARACT, WITH IOL INSERTION;  Surgeon: Radha Bedoya MD;  Location: [facility] OR;  Service: Ophthalmology;  Laterality: Left;    CATARACT EXTRACTION W/  INTRAOCULAR LENS IMPLANT Right 8/7/2024    Procedure: EXTRACTION, CATARACT, WITH IOL INSERTION;  Surgeon: Radha Bedoya MD;  Location: [facility] OR;  Service: Ophthalmology;  Laterality: Right;    COLONOSCOPY      COLONOSCOPY N/A 3/12/2021    Procedure: COLONOSCOPY;  Surgeon: Dandre Hobbs Jr., MD;  Location: [facility];  Service: Endoscopy;  Laterality: N/A;    DUPUYTREN CONTRACTURE RELEASE Bilateral     HERNIA REPAIR      Umbilical    TONSILLECTOMY         Family History   Problem Relation Name Age of Onset    Hypertension Mother      Diabetes Mother      Cataracts Mother      Glaucoma Mother          ?    Emphysema Father      Prostate cancer Brother      Macular degeneration Neg Hx      Retinal detachment Neg Hx      Strabismus Neg  Hx         Social History     Socioeconomic History    Marital status:    Occupational History    Occupation: [employer] Dist[employer]    Tobacco Use    Smoking status: Never    Smokeless tobacco: Never   Substance and Sexual Activity    Alcohol use: Yes     Alcohol/week: 7.0 standard drinks of alcohol     Types: 7 Glasses of wine per week    Drug use: Never    Sexual activity: Yes     Partners: Female   Social History Narrative    ** Merged History Encounter **          Social Determinants of Health     Financial Resource Strain: Low Risk  (5/3/2024)    Overall Financial Resource Strain (CARDIA)     Difficulty of Paying Living Expenses: Not hard at all   Food Insecurity: No Food Insecurity (5/3/2024)    Hunger Vital Sign     Worried About Running Out of Food in the Last Year: Never true     Ran Out of Food in the Last Year: Never true   Transportation Needs: No Transportation Needs (5/3/2024)    PRAPARE - Transportation     Lack of Transportation (Medical): No     Lack of Transportation (Non-Medical): No   Physical Activity: Insufficiently Active (5/3/2024)    Exercise Vital Sign     Days of Exercise per Week: 3 days     Minutes of Exercise per Session: 30 min   Stress: No Stress Concern Present (5/3/2024)    [employer] - Occupational Stress Questionnaire     Feeling of Stress : Not at all   Housing Stability: Low Risk  (11/7/2022)    Housing Stability Vital Sign     Unable to Pay for Housing in the Last Year: No     Number of Places Lived in the Last Year: 1     Unstable Housing in the Last Year: No       Current Outpatient Medications   Medication Sig Dispense Refill    allopurinoL (ZYLOPRIM) 100 MG tablet Take 2 tablets (200 mg total) by mouth once daily. 180 tablet 3    amLODIPine (NORVASC) 10 MG tablet TAKE 1 TABLET(10 MG) BY MOUTH EVERY DAY 90 tablet 3    aspirin (ECOTRIN) 81 MG EC tablet Take 1 tablet (81 mg total) by mouth once daily.  0    atorvastatin (LIPITOR) 80 MG tablet  "TAKE 1 TABLET(80 MG) BY MOUTH EVERY EVENING 90 tablet 3    blood-glucose meter kit To check BG 2 times daily, to use with insurance preferred meter 1 each 0    calcipotriene-betamethasone 0.005-0.064 % Crea Apply 1 application topically once daily at 6am. 60 g 3    clotrimazole-betamethasone 1-0.05% (LOTRISONE) cream Apply topically 2 (two) times daily.      dapagliflozin propanediol (FARXIGA) 5 mg Tab tablet Take 1 tablet (5 mg total) by mouth once daily. 90 tablet 3    finasteride (PROSCAR) 5 mg tablet Take 1 tablet (5 mg total) by mouth once daily. 90 tablet 3    FREESTYLE LITE STRIPS Strp USE AS DIRECTED TO TEST ONCE DAILY 100 strip 3    glipiZIDE (GLUCOTROL) 10 MG TR24 TAKE 1 TABLET(10 MG) BY MOUTH DAILY WITH BREAKFAST 90 tablet 1    glucosamine-chondroitin 500-400 mg tablet Take 1 tablet by mouth 3 (three) times daily.      indomethacin (INDOCIN) 50 MG capsule Take 1 capsule (50 mg total) by mouth 2 (two) times daily as needed (gout flare). 30 capsule 1    lancets (ACCU-CHEK FASTCLIX LANCET DRUM) Misc Inject 1 Units into the skin 2 (two) times a day. 200 each 11    LANCETS MISC by Misc.(Non-Drug; Combo Route) route.      levothyroxine (SYNTHROID) 150 MCG tablet Take 1 tablet (150 mcg total) by mouth before breakfast. 90 tablet 3    lisinopriL-hydrochlorothiazide (PRINZIDE,ZESTORETIC) 20-12.5 mg per tablet Take 1 tablet by mouth 2 (two) times a day. TAKE 1 TABLET BY MOUTH TWICE DAILY 180 tablet 3    metFORMIN (GLUCOPHAGE) 1000 MG tablet Take 1 tablet (1,000 mg total) by mouth 2 (two) times daily with meals. 180 tablet 1    metoprolol succinate (TOPROL-XL) 200 MG 24 hr tablet TAKE 1 TABLET(200 MG) BY MOUTH EVERY DAY 90 tablet 1    omega-3 fatty acids/fish oil (FISH OIL-OMEGA-3 FATTY ACIDS) 300-1,000 mg capsule Take by mouth once daily.      pen needle, diabetic (BD NIC 2ND GEN PEN NEEDLE) 32 gauge x 5/32" Ndle USE ONCE DAILY AS DIRECTED 100 each 3    prednisoLONE-moxiflox-bromfen 1-0.5-0.075 % DrpS Apply 1 drop " to eye 3 (three) times daily. 5 mL 3    prednisoLONE-moxiflox-bromfen 1-0.5-0.075 % DrpS Apply 1 drop to eye 3 (three) times daily. 5 mL 3    semaglutide (OZEMPIC) 1 mg/dose (4 mg/3 mL) Inject 1 mg into the skin every 7 days. 3 mL 11    tamsulosin (FLOMAX) 0.4 mg Cap Take 1 capsule (0.4 mg total) by mouth once daily. 90 capsule 3    triamcinolone acetonide 0.1% (KENALOG) 0.1 % cream Apply topically 2 (two) times daily. 453.6 g 0     No current facility-administered medications for this visit.       Review of patient's allergies indicates:  No Known Allergies      Review of Systems   Constitutional: Negative for chills and fever.   Cardiovascular:  Positive for leg swelling. Negative for claudication.   Skin:  Positive for color change, nail changes and suspicious lesions.   Musculoskeletal:  Positive for joint swelling. Negative for joint pain, muscle cramps, muscle weakness and myalgias.   Neurological:  Negative for numbness and paresthesias.   Psychiatric/Behavioral:  Negative for altered mental status.            Objective:      Physical Exam  Constitutional:       General: He is not in acute distress.     Appearance: He is well-developed. He is not diaphoretic.   Cardiovascular:      Pulses:           Dorsalis pedis pulses are 2+ on the right side and 2+ on the left side.        Posterior tibial pulses are 2+ on the right side and 2+ on the left side.      Comments: CFT is < 3 seconds bilateral.  Pedal hair growth is present bilateral.  Mild varicosities noted bilateral.  Moderate non pitting lower extremity edema noted bilateral.  Toes are warm to touch bilateral.    Musculoskeletal:         General: No tenderness.      Comments: Muscle strength 5/5 in all muscle groups bilateral.  No tenderness nor crepitation with ROM of foot/ankle joints bilateral.  No tenderness with palpation of bilateral foot and ankle.  Bilateral pes planus foot type.  Bilateral semi-rigid contracture of toes 2-5.   Skin:     General:  Skin is warm and dry.      Capillary Refill: Capillary refill takes 2 to 3 seconds.      Coloration: Skin is not pale.      Findings: Lesion present. No abrasion, bruising, burn, ecchymosis, erythema, laceration or rash.      Comments: Pedal skin appears edematous bilateral.  Toenails x 10 appear thickened by 2 mm elongated by 4 mm, and discolored with subungual debris.  Porokeratosis noted to bilateral sub 3rd met head.   Neurological:      Mental Status: He is alert and oriented to person, place, and time.      Sensory: Sensory deficit present.      Motor: No weakness or atrophy.      Comments: Protective sensation per Sanborn-Derick monofilament is intact bilateral.  Vibratory sensation is decreased bilateral.  Light touch is intact bilateral.               Assessment:       Encounter Diagnoses   Name Primary?    Diabetic polyneuropathy associated with type 2 diabetes mellitus Yes    Hammer toes of both feet     Onychomycosis due to dermatophyte     Porokeratosis          Plan:       Diagnoses and all orders for this visit:    Diabetic polyneuropathy associated with type 2 diabetes mellitus    Hammer toes of both feet    Onychomycosis due to dermatophyte    Porokeratosis      I counseled the patient on his conditions, their implications and medical management.    Advised to continue wearing shoe gear that accommodates digital deformities.    Shoe inspection. Diabetic Foot Education. Patient reminded of the importance of good nutrition and blood sugar control to help prevent podiatric complications of diabetes. Patient instructed on proper foot hygeine. We discussed wearing proper shoe gear, daily foot inspections, never walking without protective shoe gear, never putting sharp instruments to feet.    With patient's permission, nails were aggressively reduced and debrided x 10  to their soft tissue attachment mechanically and with electric , removing all offending nail and debris.  Also, a sterile #15  scalpel was used to trim lesions x 2 down to smooth appearing skin without incident.  Patient relates relief following the procedure. He will continue to monitor the areas daily, inspect his feet, wear protective shoe gear when ambulatory, moisturizer to maintain skin integrity and follow in this office in approximately 4 months, sooner p.r.n.    Alex Olson DPM

## 2024-10-08 RX ORDER — FINASTERIDE 5 MG/1
5 TABLET, FILM COATED ORAL
Qty: 90 TABLET | Refills: 3 | Status: SHIPPED | OUTPATIENT
Start: 2024-10-08

## 2024-10-13 DIAGNOSIS — I10 ESSENTIAL HYPERTENSION: Primary | ICD-10-CM

## 2024-10-13 NOTE — TELEPHONE ENCOUNTER
Refill Routing Note   Medication(s) are not appropriate for processing by [facility] for the following reason(s):        Required vitals abnormal  08/07/24 (!) 143/70 07/17/24 (!) 173/79       OR action(s):  Defer     Requires labs : Yes -A1c     Medication Therapy Plan: 08/07/24 (!) 143/70 07/17/24 (!) 173/79      Appointments  past 12m or future 3m with PCP    Date Provider   Last Visit   5/6/2024 Marlon Iglesias MD   Next Visit   11/6/2024 Marlon Iglesias MD   ED visits in past 90 days: 0        Note composed:4:30 PM 10/13/2024

## 2024-10-13 NOTE — TELEPHONE ENCOUNTER
Care Due:                  Date            Visit Type   Department     Provider  --------------------------------------------------------------------------------                                EP -                              [facility]  Last Visit: 05-      CARE ([facility])   WAYNE Iglesias                               -                              [facility]  Next Visit: 11-      CARE ([facility])   MEDICINE       Marlon Iglesias                                                            Last  Test          Frequency    Reason                     Performed    Due Date  --------------------------------------------------------------------------------    HBA1C.......  6 months...  dapagliflozin, glipiZIDE,   04-   10-                             metFORMIN, semaglutide...    [employer] Embedded Care Due Messages. Reference number: [id number].   10/13/2024 7:45:18 AM CDT

## 2024-10-14 RX ORDER — METOPROLOL SUCCINATE 200 MG/1
200 TABLET, EXTENDED RELEASE ORAL DAILY
Qty: 90 TABLET | Refills: 3 | Status: SHIPPED | OUTPATIENT
Start: 2024-10-14 | End: 2025-10-14

## 2024-10-30 ENCOUNTER — LAB VISIT (OUTPATIENT)
Dept: LAB | Facility: HOSPITAL | Age: 74
End: 2024-10-30
Attending: INTERNAL MEDICINE
Payer: MEDICARE

## 2024-10-30 DIAGNOSIS — M10.49 OTHER SECONDARY ACUTE GOUT OF MULTIPLE SITES: ICD-10-CM

## 2024-10-30 DIAGNOSIS — E03.4 HYPOTHYROIDISM DUE TO ACQUIRED ATROPHY OF THYROID: ICD-10-CM

## 2024-10-30 DIAGNOSIS — E11.42 TYPE 2 DIABETES MELLITUS WITH DIABETIC POLYNEUROPATHY, WITHOUT LONG-TERM CURRENT USE OF INSULIN: ICD-10-CM

## 2024-10-30 LAB
ALBUMIN SERPL BCP-MCNC: 4 G/DL (ref 3.5–5.2)
ALBUMIN/CREAT UR: 41.1 UG/MG (ref 0–30)
ALP SERPL-CCNC: 65 U/L (ref 40–150)
ALT SERPL W/O P-5'-P-CCNC: 47 U/L (ref 10–44)
ANION GAP SERPL CALC-SCNC: 12 MMOL/L (ref 8–16)
AST SERPL-CCNC: 27 U/L (ref 10–40)
BASOPHILS # BLD AUTO: 0.05 K/UL (ref 0–0.2)
BASOPHILS NFR BLD: 0.7 % (ref 0–1.9)
BILIRUB SERPL-MCNC: 0.9 MG/DL (ref 0.1–1)
BUN SERPL-MCNC: 11 MG/DL (ref 8–23)
CALCIUM SERPL-MCNC: 9.5 MG/DL (ref 8.7–10.5)
CHLORIDE SERPL-SCNC: 101 MMOL/L (ref 95–110)
CO2 SERPL-SCNC: 27 MMOL/L (ref 23–29)
CREAT SERPL-MCNC: 0.9 MG/DL (ref 0.5–1.4)
CREAT UR-MCNC: 151 MG/DL (ref 23–375)
DIFFERENTIAL METHOD BLD: ABNORMAL
EOSINOPHIL # BLD AUTO: 0.2 K/UL (ref 0–0.5)
EOSINOPHIL NFR BLD: 2.5 % (ref 0–8)
ERYTHROCYTE [DISTWIDTH] IN BLOOD BY AUTOMATED COUNT: 13 % (ref 11.5–14.5)
EST. GFR  (NO RACE VARIABLE): >60 ML/MIN/1.73 M^2
ESTIMATED AVG GLUCOSE: 146 MG/DL (ref 68–131)
GLUCOSE SERPL-MCNC: 150 MG/DL (ref 70–110)
HBA1C MFR BLD: 6.7 % (ref 4–5.6)
HCT VFR BLD AUTO: 49.7 % (ref 40–54)
HGB BLD-MCNC: 16.8 G/DL (ref 14–18)
IMM GRANULOCYTES # BLD AUTO: 0.05 K/UL (ref 0–0.04)
IMM GRANULOCYTES NFR BLD AUTO: 0.7 % (ref 0–0.5)
LYMPHOCYTES # BLD AUTO: 2 K/UL (ref 1–4.8)
LYMPHOCYTES NFR BLD: 26.9 % (ref 18–48)
MCH RBC QN AUTO: 31.3 PG (ref 27–31)
MCHC RBC AUTO-ENTMCNC: 33.8 G/DL (ref 32–36)
MCV RBC AUTO: 93 FL (ref 82–98)
MICROALBUMIN UR DL<=1MG/L-MCNC: 62 UG/ML
MONOCYTES # BLD AUTO: 0.7 K/UL (ref 0.3–1)
MONOCYTES NFR BLD: 9.3 % (ref 4–15)
NEUTROPHILS # BLD AUTO: 4.4 K/UL (ref 1.8–7.7)
NEUTROPHILS NFR BLD: 59.9 % (ref 38–73)
NRBC BLD-RTO: 0 /100 WBC
PLATELET # BLD AUTO: 145 K/UL (ref 150–450)
PMV BLD AUTO: 10.7 FL (ref 9.2–12.9)
POTASSIUM SERPL-SCNC: 3.5 MMOL/L (ref 3.5–5.1)
PROT SERPL-MCNC: 6.5 G/DL (ref 6–8.4)
RBC # BLD AUTO: 5.37 M/UL (ref 4.6–6.2)
SODIUM SERPL-SCNC: 140 MMOL/L (ref 136–145)
TSH SERPL DL<=0.005 MIU/L-ACNC: 2.23 UIU/ML (ref 0.4–4)
URATE SERPL-MCNC: 5.6 MG/DL (ref 3.4–7)
WBC # BLD AUTO: 7.29 K/UL (ref 3.9–12.7)

## 2024-10-30 PROCEDURE — 83036 HEMOGLOBIN GLYCOSYLATED A1C: CPT | Performed by: INTERNAL MEDICINE

## 2024-10-30 PROCEDURE — 84550 ASSAY OF BLOOD/URIC ACID: CPT | Performed by: INTERNAL MEDICINE

## 2024-10-30 PROCEDURE — 36415 COLL VENOUS BLD VENIPUNCTURE: CPT | Mod: PO | Performed by: INTERNAL MEDICINE

## 2024-10-30 PROCEDURE — 85025 COMPLETE CBC W/AUTO DIFF WBC: CPT | Performed by: INTERNAL MEDICINE

## 2024-10-30 PROCEDURE — 84443 ASSAY THYROID STIM HORMONE: CPT | Performed by: INTERNAL MEDICINE

## 2024-10-30 PROCEDURE — 82043 UR ALBUMIN QUANTITATIVE: CPT | Performed by: INTERNAL MEDICINE

## 2024-10-30 PROCEDURE — 80053 COMPREHEN METABOLIC PANEL: CPT | Performed by: INTERNAL MEDICINE

## 2024-11-05 ENCOUNTER — TELEPHONE (OUTPATIENT)
Dept: FAMILY MEDICINE | Facility: CLINIC | Age: 74
End: 2024-11-05
Payer: MEDICARE

## 2024-11-05 NOTE — TELEPHONE ENCOUNTER
Called pt, no answer, left a voicemail informing pt that appointment on 11/6/24 will need to be rescheduled due to provider not being in clinic.

## 2024-11-06 ENCOUNTER — OFFICE VISIT (OUTPATIENT)
Dept: FAMILY MEDICINE | Facility: CLINIC | Age: 74
End: 2024-11-06
Payer: MEDICARE

## 2024-11-06 VITALS
BODY MASS INDEX: 38.98 KG/M2 | HEIGHT: 70 IN | WEIGHT: 272.25 LBS | SYSTOLIC BLOOD PRESSURE: 136 MMHG | HEART RATE: 80 BPM | DIASTOLIC BLOOD PRESSURE: 70 MMHG | OXYGEN SATURATION: 95 %

## 2024-11-06 DIAGNOSIS — E03.4 HYPOTHYROIDISM DUE TO ACQUIRED ATROPHY OF THYROID: ICD-10-CM

## 2024-11-06 DIAGNOSIS — G47.33 OSA ON CPAP: ICD-10-CM

## 2024-11-06 DIAGNOSIS — I10 ESSENTIAL HYPERTENSION: Primary | ICD-10-CM

## 2024-11-06 DIAGNOSIS — M10.09 ACUTE IDIOPATHIC GOUT OF MULTIPLE SITES: ICD-10-CM

## 2024-11-06 DIAGNOSIS — E11.42 TYPE 2 DIABETES MELLITUS WITH DIABETIC POLYNEUROPATHY, WITHOUT LONG-TERM CURRENT USE OF INSULIN: ICD-10-CM

## 2024-11-06 DIAGNOSIS — E78.2 MIXED HYPERLIPIDEMIA: ICD-10-CM

## 2024-11-06 PROCEDURE — 99214 OFFICE O/P EST MOD 30 MIN: CPT | Mod: S$PBB,,, | Performed by: PHYSICIAN ASSISTANT

## 2024-11-06 PROCEDURE — 99999 PR PBB SHADOW E&M-EST. PATIENT-LVL V: CPT | Mod: PBBFAC,,, | Performed by: PHYSICIAN ASSISTANT

## 2024-11-06 PROCEDURE — 99215 OFFICE O/P EST HI 40 MIN: CPT | Mod: PBBFAC,PO | Performed by: PHYSICIAN ASSISTANT

## 2024-11-06 NOTE — PROGRESS NOTES
"Subjective:      Patient ID: Tal Maldonado is a 74 y.o. male.    Chief Complaint: Follow-up (6 month f/u)    Patient is new to me.    HPI  Patient has PMH of Type 2 DM, psoriasis, HTN, HLD, BPH, hypothyroidism, colon polyp, gout, and WINSOME on CPAP.    Patient is in visit to review chronic medical issues.  HTN-controlled,  HLD-controlled.  WINSOME-using CPAP nightly.  Gout-controlled with allopurinol  Hypothyroidism-controlled.  Type 2 DM-  Blood sugar has been 125-130.  Taking ozempic 1mg weekly.    Lab Results   Component Value Date    HGBA1C 6.7 (H) 10/30/2024      Swimming weekly and using elliptical.  Has hearing aids.    Review of Systems   Constitutional:  Negative for appetite change, chills and fever.   HENT:  Negative for ear pain, sore throat and trouble swallowing.    Eyes:  Negative for pain.   Respiratory:  Negative for cough and shortness of breath.    Cardiovascular:  Negative for chest pain.   Gastrointestinal:  Negative for abdominal pain, blood in stool, constipation, diarrhea, nausea and vomiting.   Genitourinary:  Negative for dysuria, frequency and hematuria.   Musculoskeletal:  Negative for myalgias.   Skin:  Negative for rash.   Neurological:  Negative for dizziness, syncope, light-headedness and headaches.   Psychiatric/Behavioral:  Negative for sleep disturbance.        Objective:   /70   Pulse 80   Ht 5' 10" (1.778 m)   Wt 123.5 kg (272 lb 4.3 oz)   SpO2 95%   BMI 39.07 kg/m²     Physical Exam  Vitals reviewed.   Constitutional:       Appearance: Normal appearance. He is well-developed.   HENT:      Head: Normocephalic and atraumatic.      Right Ear: External ear normal.      Left Ear: External ear normal.   Eyes:      Conjunctiva/sclera: Conjunctivae normal.   Cardiovascular:      Rate and Rhythm: Normal rate and regular rhythm.      Heart sounds: Normal heart sounds. No murmur heard.     No friction rub. No gallop.   Pulmonary:      Effort: Pulmonary effort is normal. No respiratory " distress.      Breath sounds: Normal breath sounds. No wheezing, rhonchi or rales.   Abdominal:      General: There is no distension.      Palpations: Abdomen is soft.   Musculoskeletal:         General: Normal range of motion.   Skin:     General: Skin is warm and dry.      Findings: No rash.   Neurological:      General: No focal deficit present.      Mental Status: He is alert and oriented to person, place, and time.   Psychiatric:         Mood and Affect: Mood normal.         Behavior: Behavior normal.         Judgment: Judgment normal.       Assessment:      1. Essential hypertension    2. Mixed hyperlipidemia    3. WINSOME on CPAP    4. Acute idiopathic gout of multiple sites    5. Hypothyroidism due to acquired atrophy of thyroid    6. Type 2 diabetes mellitus with diabetic polyneuropathy, without long-term current use of insulin       Plan:   1. Essential hypertension (Primary)  Controlled.  Bloodwork scheduled 2 days prior to next visit.  - Comprehensive Metabolic Panel; Future  - CBC Auto Differential; Future    2. Mixed hyperlipidemia  Controlled.    3. WINSOME on CPAP  Controlled.    4. Acute idiopathic gout of multiple sites  Controlled.    5. Hypothyroidism due to acquired atrophy of thyroid  Controlled.    6. Type 2 diabetes mellitus with diabetic polyneuropathy, without long-term current use of insulin  Controlled.  - Hemoglobin A1C; Future    Follow up in 6 months with Dr. Iglesias.  Patient agreed with plan and expressed understanding.    Thank you for allowing me to serve you,

## 2024-11-24 DIAGNOSIS — E11.42 TYPE 2 DIABETES MELLITUS WITH DIABETIC POLYNEUROPATHY, WITHOUT LONG-TERM CURRENT USE OF INSULIN: ICD-10-CM

## 2024-11-24 RX ORDER — GLIPIZIDE 10 MG/1
10 TABLET, FILM COATED, EXTENDED RELEASE ORAL
Qty: 90 TABLET | Refills: 1 | Status: SHIPPED | OUTPATIENT
Start: 2024-11-24

## 2024-11-24 NOTE — TELEPHONE ENCOUNTER
Refill Decision Note   Tal Joel  is requesting a refill authorization.  Brief Assessment and Rationale for Refill:  Approve     Medication Therapy Plan:         Comments:     Note composed:3:34 PM 11/24/2024

## 2024-11-24 NOTE — TELEPHONE ENCOUNTER
No care due was identified.  [facility] Embedded Care Due Messages. Reference number: [id number].   11/24/2024 7:08:54 AM CST

## 2025-01-04 DIAGNOSIS — E11.42 TYPE 2 DIABETES MELLITUS WITH DIABETIC POLYNEUROPATHY, WITHOUT LONG-TERM CURRENT USE OF INSULIN: ICD-10-CM

## 2025-01-04 NOTE — TELEPHONE ENCOUNTER
No care due was identified.  [facility] Embedded Care Due Messages. Reference number: [id number].   1/04/2025 8:49:04 AM CST

## 2025-01-05 RX ORDER — DAPAGLIFLOZIN 5 MG/1
5 TABLET, FILM COATED ORAL DAILY
Qty: 90 TABLET | Refills: 3 | OUTPATIENT
Start: 2025-01-05 | End: 2026-01-05

## 2025-01-06 RX ORDER — FINASTERIDE 5 MG/1
5 TABLET, FILM COATED ORAL DAILY
Qty: 90 TABLET | Refills: 3 | Status: SHIPPED | OUTPATIENT
Start: 2025-01-06

## 2025-01-10 ENCOUNTER — PATIENT MESSAGE (OUTPATIENT)
Dept: FAMILY MEDICINE | Facility: CLINIC | Age: 75
End: 2025-01-10
Payer: MEDICARE

## 2025-01-11 ENCOUNTER — PATIENT MESSAGE (OUTPATIENT)
Dept: FAMILY MEDICINE | Facility: CLINIC | Age: 75
End: 2025-01-11
Payer: MEDICARE

## 2025-01-23 RX ORDER — TAMSULOSIN HYDROCHLORIDE 0.4 MG/1
0.4 CAPSULE ORAL
Qty: 90 CAPSULE | Refills: 3 | Status: SHIPPED | OUTPATIENT
Start: 2025-01-23

## 2025-01-31 ENCOUNTER — LAB VISIT (OUTPATIENT)
Dept: LAB | Facility: HOSPITAL | Age: 75
End: 2025-01-31
Attending: UROLOGY
Payer: MEDICARE

## 2025-01-31 ENCOUNTER — OFFICE VISIT (OUTPATIENT)
Dept: UROLOGY | Facility: CLINIC | Age: 75
End: 2025-01-31
Payer: MEDICARE

## 2025-01-31 VITALS — HEIGHT: 70 IN | WEIGHT: 275.81 LBS | BODY MASS INDEX: 39.49 KG/M2

## 2025-01-31 DIAGNOSIS — N13.8 ENLARGED PROSTATE WITH URINARY OBSTRUCTION: Primary | ICD-10-CM

## 2025-01-31 DIAGNOSIS — Z12.5 SCREENING FOR PROSTATE CANCER: ICD-10-CM

## 2025-01-31 DIAGNOSIS — N40.1 ENLARGED PROSTATE WITH URINARY OBSTRUCTION: Primary | ICD-10-CM

## 2025-01-31 LAB — COMPLEXED PSA SERPL-MCNC: 0.74 NG/ML (ref 0–4)

## 2025-01-31 PROCEDURE — 99213 OFFICE O/P EST LOW 20 MIN: CPT | Mod: PBBFAC,PO | Performed by: UROLOGY

## 2025-01-31 PROCEDURE — 99214 OFFICE O/P EST MOD 30 MIN: CPT | Mod: S$PBB,,, | Performed by: UROLOGY

## 2025-01-31 PROCEDURE — 36415 COLL VENOUS BLD VENIPUNCTURE: CPT | Mod: PO | Performed by: UROLOGY

## 2025-01-31 PROCEDURE — 84153 ASSAY OF PSA TOTAL: CPT | Performed by: UROLOGY

## 2025-01-31 PROCEDURE — 99999 PR PBB SHADOW E&M-EST. PATIENT-LVL III: CPT | Mod: PBBFAC,,, | Performed by: UROLOGY

## 2025-01-31 NOTE — PROGRESS NOTES
Subjective:       Patient ID: Tal Maldonado is a 74 y.o. male.    Chief Complaint: Annual Exam    HPI    74-year-old with BPH.  He has been maintained on Flomax and Finasteride for greater than 10 years he estimates.  He does have a family history of prostate cancer.  His brother had a radical prostatectomy in the past.  His last PSA was 065.  When he takes his Flomax and finasteride he has no bothersome urinary symptoms.  He denies hematuria and dysuria.  He has no previous urinary tract infections.  He is overall satisfied with his urinary symptoms.    Urine dipstick shows negative for all components except glucose.     Component PSA, Screen   Latest Ref Rng & Units 0.00 - 4.00 ng/mL   1/29/2024 0.65   9/23/2022 0.61   3/19/2021 0.66   1/27/2020 0.52       Review of Systems   Constitutional:  Negative for fever.   Genitourinary:  Negative for dysuria and hematuria.       Objective:      Physical Exam  Vitals reviewed.   Constitutional:       Appearance: He is well-developed.   Pulmonary:      Effort: Pulmonary effort is normal.   Neurological:      Mental Status: He is alert and oriented to person, place, and time.         Assessment:       1. Enlarged prostate with urinary obstruction    2. Screening for prostate cancer        Plan:       Enlarged prostate with urinary obstruction    Screening for prostate cancer  -     PSA, Screening; Future; Expected date: 01/31/2025      Continue Flomax and finasteride.  Update PSA.  Annual follow-up.

## 2025-02-14 ENCOUNTER — TELEPHONE (OUTPATIENT)
Dept: PODIATRY | Facility: CLINIC | Age: 75
End: 2025-02-14
Payer: MEDICARE

## 2025-02-14 NOTE — TELEPHONE ENCOUNTER
Patient was rescheduled for 03/27/25 @ 4:20 pm. Patient was called and phone went to voicemail and a message was left for a call back if the appointment date dose not work. He was added to the wait list.

## 2025-02-24 DIAGNOSIS — Z00.00 ENCOUNTER FOR MEDICARE ANNUAL WELLNESS EXAM: ICD-10-CM

## 2025-03-27 ENCOUNTER — OFFICE VISIT (OUTPATIENT)
Dept: PODIATRY | Facility: CLINIC | Age: 75
End: 2025-03-27
Payer: MEDICARE

## 2025-03-27 VITALS — WEIGHT: 275.81 LBS | BODY MASS INDEX: 39.49 KG/M2 | HEIGHT: 70 IN

## 2025-03-27 DIAGNOSIS — Q82.8 POROKERATOSIS: ICD-10-CM

## 2025-03-27 DIAGNOSIS — M20.42 HAMMER TOES OF BOTH FEET: ICD-10-CM

## 2025-03-27 DIAGNOSIS — B35.1 ONYCHOMYCOSIS DUE TO DERMATOPHYTE: ICD-10-CM

## 2025-03-27 DIAGNOSIS — M20.41 HAMMER TOES OF BOTH FEET: ICD-10-CM

## 2025-03-27 DIAGNOSIS — E11.42 DIABETIC POLYNEUROPATHY ASSOCIATED WITH TYPE 2 DIABETES MELLITUS: Primary | ICD-10-CM

## 2025-03-27 DIAGNOSIS — E66.01 SEVERE OBESITY (BMI 35.0-39.9) WITH COMORBIDITY: ICD-10-CM

## 2025-03-27 PROCEDURE — 11056 PARNG/CUTG B9 HYPRKR LES 2-4: CPT | Mod: PBBFAC,PO | Performed by: PODIATRIST

## 2025-03-27 PROCEDURE — 11721 DEBRIDE NAIL 6 OR MORE: CPT | Mod: PBBFAC,PO | Performed by: PODIATRIST

## 2025-03-27 PROCEDURE — 99999 PR PBB SHADOW E&M-EST. PATIENT-LVL IV: CPT | Mod: PBBFAC,,, | Performed by: PODIATRIST

## 2025-03-27 PROCEDURE — 99214 OFFICE O/P EST MOD 30 MIN: CPT | Mod: PBBFAC,PO | Performed by: PODIATRIST

## 2025-03-27 NOTE — PROGRESS NOTES
Subjective:      Patient ID: Tal Maldonado is a 74 y.o. male.    Chief Complaint: Diabetes Mellitus and Nail Care (4 month DM nail care)      Tal is a 74 y.o. male who presents to the clinic for evaluation and treatment of diabetic feet. Tal has a past medical history of Abnormal results of liver function studies  (09/23/2020), Amblyopia, Anatomical narrow angle of both eyes, Arthritis of hand, BPH (benign prostatic hyperplasia), Cataract, Colon polyps, COPD (chronic obstructive pulmonary disease), Coronary artery disease, Diabetes mellitus, Elevated liver enzymes (09/23/2020), Hypertension, WINSOME on CPAP, Other hyperlipidemia, Sensorineural hearing loss (SNHL) of left ear, Superficial thrombophlebitis, and Superficial thrombophlebitis. Patient relates having toenails that are in need of trimming.  Denies being painful with wearing shoe gear.  Has not attempted to treat.  States his blood glucose has improved since the last exam.  Denies overt neuropathy pain with today's exam.  Denies any additional pedal complaints.    PCP: Marlon Iglesias MD    Date Last Seen by PCP: 11/24    Hemoglobin A1C   Date Value Ref Range Status   10/30/2024 6.7 (H) 4.0 - 5.6 % Final     Comment:     ADA Screening Guidelines:  5.7-6.4%  Consistent with prediabetes  >or=6.5%  Consistent with diabetes    High levels of fetal hemoglobin interfere with the HbA1C  assay. Heterozygous hemoglobin variants (HbS, HgC, etc)do  not significantly interfere with this assay.   However, presence of multiple variants may affect accuracy.     04/29/2024 7.0 (H) 4.0 - 5.6 % Final     Comment:     ADA Screening Guidelines:  5.7-6.4%  Consistent with prediabetes  >or=6.5%  Consistent with diabetes    High levels of fetal hemoglobin interfere with the HbA1C  assay. Heterozygous hemoglobin variants (HbS, HgC, etc)do  not significantly interfere with this assay.   However, presence of multiple variants may affect accuracy.     11/03/2023 7.1 (H) 4.0 - 5.6 %  Final     Comment:     ADA Screening Guidelines:  5.7-6.4%  Consistent with prediabetes  >or=6.5%  Consistent with diabetes    High levels of fetal hemoglobin interfere with the HbA1C  assay. Heterozygous hemoglobin variants (HbS, HgC, etc)do  not significantly interfere with this assay.   However, presence of multiple variants may affect accuracy.             Past Medical History:   Diagnosis Date    Abnormal results of liver function studies  09/23/2020    Amblyopia     OD    Anatomical narrow angle of both eyes     Arthritis of hand     BPH (benign prostatic hyperplasia)     Cataract     Colon polyps     COPD (chronic obstructive pulmonary disease)     Coronary artery disease     Diabetes mellitus     Elevated liver enzymes 09/23/2020    Hypertension     WINSOME on CPAP     Other hyperlipidemia     Sensorineural hearing loss (SNHL) of left ear     Superficial thrombophlebitis     Superficial thrombophlebitis        Past Surgical History:   Procedure Laterality Date    APPENDECTOMY      blephroplasty Bilateral 2009    CATARACT EXTRACTION W/  INTRAOCULAR LENS IMPLANT Left 7/17/2024    Procedure: EXTRACTION, CATARACT, WITH IOL INSERTION;  Surgeon: Radha Bedoya MD;  Location: [facility] OR;  Service: Ophthalmology;  Laterality: Left;    CATARACT EXTRACTION W/  INTRAOCULAR LENS IMPLANT Right 8/7/2024    Procedure: EXTRACTION, CATARACT, WITH IOL INSERTION;  Surgeon: Radha Bedoya MD;  Location: [facility] OR;  Service: Ophthalmology;  Laterality: Right;    COLONOSCOPY      COLONOSCOPY N/A 3/12/2021    Procedure: COLONOSCOPY;  Surgeon: Dandre Hobbs Jr., MD;  Location: [facility];  Service: Endoscopy;  Laterality: N/A;    DUPUYTREN CONTRACTURE RELEASE Bilateral     HERNIA REPAIR      Umbilical    TONSILLECTOMY         Family History   Problem Relation Name Age of Onset    Hypertension Mother      Diabetes Mother      Cataracts Mother      Glaucoma Mother          ?    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    Tobacco Use    Smoking status: Never    Smokeless tobacco: Never   Substance and Sexual Activity    Alcohol use: Yes     Alcohol/week: 7.0 standard drinks of alcohol     Types: 7 Glasses of wine per week    Drug use: Never    Sexual activity: Yes     Partners: Female   Social History Narrative    ** Merged History Encounter **          Social Drivers of Health     Financial Resource Strain: Low Risk  (5/3/2024)    Overall Financial Resource Strain (CARDIA)     Difficulty of Paying Living Expenses: Not hard at all   Food Insecurity: No Food Insecurity (5/3/2024)    Hunger Vital Sign     Worried About Running Out of Food in the Last Year: Never true     Ran Out of Food in the Last Year: Never true   Transportation Needs: Unknown (5/3/2024)    PRAPARE - Transportation     Lack of Transportation (Medical): No   Physical Activity: Unknown (5/3/2024)    Exercise Vital Sign     Days of Exercise per Week: 3 days   Recent Concern: Physical Activity - Insufficiently Active (5/3/2024)    Exercise Vital Sign     Days of Exercise per Week: 3 days     Minutes of Exercise per Session: 30 min   Stress: No Stress Concern Present (5/3/2024)    [employer] [place] [employer] - Occupational Stress Questionnaire     Feeling of Stress : Not at all   Housing Stability: Low Risk  (11/7/2022)    Housing Stability Vital Sign     Unable to Pay for Housing in the Last Year: No     Number of Places Lived in the Last Year: 1     Unstable Housing in the Last Year: No       Current Outpatient Medications   Medication Sig Dispense Refill    allopurinoL (ZYLOPRIM) 100 MG tablet Take 2 tablets (200 mg total) by mouth once daily. 180 tablet 3    amLODIPine (NORVASC) 10 MG tablet TAKE 1 TABLET(10 MG) BY MOUTH EVERY DAY 90 tablet 3  "   aspirin (ECOTRIN) 81 MG EC tablet Take 1 tablet (81 mg total) by mouth once daily.  0    atorvastatin (LIPITOR) 80 MG tablet TAKE 1 TABLET(80 MG) BY MOUTH EVERY EVENING 90 tablet 3    calcipotriene-betamethasone 0.005-0.064 % Crea Apply 1 application topically once daily at 6am. 60 g 3    clotrimazole-betamethasone 1-0.05% (LOTRISONE) cream Apply topically 2 (two) times daily.      FARXIGA 5 mg Tab tablet TAKE 1 TABLET(5 MG) BY MOUTH DAILY 90 tablet 1    finasteride (PROSCAR) 5 mg tablet Take 1 tablet (5 mg total) by mouth once daily. 90 tablet 3    FREESTYLE LITE STRIPS Strp USE AS DIRECTED TO TEST ONCE DAILY 100 strip 3    glipiZIDE (GLUCOTROL) 10 MG TR24 Take 1 tablet (10 mg total) by mouth daily with breakfast. 90 tablet 1    glucosamine-chondroitin 500-400 mg tablet Take 1 tablet by mouth 3 (three) times daily.      indomethacin (INDOCIN) 50 MG capsule Take 1 capsule (50 mg total) by mouth 2 (two) times daily as needed (gout flare). 30 capsule 1    lancets (ACCU-CHEK FASTCLIX LANCET DRUM) Misc Inject 1 Units into the skin 2 (two) times a day. 200 each 11    LANCETS MISC by Misc.(Non-Drug; Combo Route) route.      levothyroxine (SYNTHROID) 150 MCG tablet TAKE 1 TABLET(150 MCG) BY MOUTH BEFORE BREAKFAST 90 tablet 1    lisinopriL-hydrochlorothiazide (PRINZIDE,ZESTORETIC) 20-12.5 mg per tablet Take 1 tablet by mouth 2 (two) times a day. TAKE 1 TABLET BY MOUTH TWICE DAILY 180 tablet 3    metFORMIN (GLUCOPHAGE) 1000 MG tablet Take 1 tablet (1,000 mg total) by mouth 2 (two) times daily with meals. 180 tablet 1    metoprolol succinate (TOPROL-XL) 200 MG 24 hr tablet Take 1 tablet (200 mg total) by mouth once daily. 90 tablet 3    omega-3 fatty acids/fish oil (FISH OIL-OMEGA-3 FATTY ACIDS) 300-1,000 mg capsule Take by mouth once daily.      pen needle, diabetic (BD NIC 2ND GEN PEN NEEDLE) 32 gauge x 5/32" Ndle USE ONCE DAILY AS DIRECTED 100 each 3    prednisoLONE-moxiflox-bromfen 1-0.5-0.075 % DrpS Apply 1 drop to " eye 3 (three) times daily. 5 mL 3    prednisoLONE-moxiflox-bromfen 1-0.5-0.075 % DrpS Apply 1 drop to eye 3 (three) times daily. 5 mL 3    semaglutide (OZEMPIC) 1 mg/dose (4 mg/3 mL) Inject 1 mg into the skin every 7 days. 3 mL 11    tamsulosin (FLOMAX) 0.4 mg Cap TAKE 1 CAPSULE(0.4 MG) BY MOUTH EVERY DAY 90 capsule 3    triamcinolone acetonide 0.1% (KENALOG) 0.1 % cream Apply topically 2 (two) times daily. 453.6 g 0    blood-glucose meter kit To check BG 2 times daily, to use with insurance preferred meter 1 each 0     No current facility-administered medications for this visit.       Review of patient's allergies indicates:  No Known Allergies      Review of Systems   Constitutional: Negative for chills and fever.   Cardiovascular:  Positive for leg swelling. Negative for claudication.   Skin:  Positive for color change, nail changes and suspicious lesions.   Musculoskeletal:  Positive for joint swelling. Negative for joint pain, muscle cramps, muscle weakness and myalgias.   Neurological:  Negative for numbness and paresthesias.   Psychiatric/Behavioral:  Negative for altered mental status.            Objective:      Physical Exam  Constitutional:       General: He is not in acute distress.     Appearance: He is well-developed. He is not diaphoretic.   Cardiovascular:      Pulses:           Dorsalis pedis pulses are 2+ on the right side and 2+ on the left side.        Posterior tibial pulses are 2+ on the right side and 2+ on the left side.      Comments: CFT is < 3 seconds bilateral.  Pedal hair growth is present bilateral.  Mild varicosities noted bilateral.  Moderate non pitting lower extremity edema noted bilateral.  Toes are warm to touch bilateral.    Musculoskeletal:         General: No tenderness.      Comments: Muscle strength 5/5 in all muscle groups bilateral.  No tenderness nor crepitation with ROM of foot/ankle joints bilateral.  No tenderness with palpation of bilateral foot and ankle.  Bilateral  pes planus foot type.  Bilateral semi-rigid contracture of toes 2-5.   Skin:     General: Skin is warm and dry.      Capillary Refill: Capillary refill takes 2 to 3 seconds.      Coloration: Skin is not pale.      Findings: Lesion present. No abrasion, bruising, burn, ecchymosis, erythema, laceration or rash.      Comments: Pedal skin appears edematous bilateral.  Toenails x 10 appear thickened by 2 mm elongated by 6 mm, and discolored with subungual debris.  Porokeratosis noted to bilateral sub 3rd met head.   Neurological:      Mental Status: He is alert and oriented to person, place, and time.      Sensory: Sensory deficit present.      Motor: No weakness or atrophy.      Comments: Protective sensation per Gilman-Derick monofilament is intact bilateral.  Vibratory sensation is decreased bilateral.  Light touch is intact bilateral.               Assessment:       Encounter Diagnoses   Name Primary?    Diabetic polyneuropathy associated with type 2 diabetes mellitus Yes    Hammer toes of both feet     Onychomycosis due to dermatophyte     Severe obesity (BMI 35.0-39.9) with comorbidity     Porokeratosis          Plan:       Tal was seen today for diabetes mellitus and nail care.    Diagnoses and all orders for this visit:    Diabetic polyneuropathy associated with type 2 diabetes mellitus    Hammer toes of both feet    Onychomycosis due to dermatophyte    Severe obesity (BMI 35.0-39.9) with comorbidity    Porokeratosis      I counseled the patient on his conditions, their implications and medical management.    Advised to continue wearing shoe gear that accommodates digital deformities.    Discussed the importance of diet and exercise as they pertain to diabetes management and maintaining a healthy BMI.     Shoe inspection. Diabetic Foot Education. Patient reminded of the importance of good nutrition and blood sugar control to help prevent podiatric complications of diabetes. Patient instructed on proper foot  hygeine. We discussed wearing proper shoe gear, daily foot inspections, never walking without protective shoe gear, never putting sharp instruments to feet.    With patient's permission, nails were aggressively reduced and debrided x 10  to their soft tissue attachment mechanically and with electric , removing all offending nail and debris.  Also, a sterile #15 scalpel was used to trim lesions x 2 down to smooth appearing skin without incident.  Patient relates relief following the procedure. He will continue to monitor the areas daily, inspect his feet, wear protective shoe gear when ambulatory, moisturizer to maintain skin integrity and follow in this office in approximately 4 months, sooner p.r.n.    Alex Olson DPM

## 2025-03-29 DIAGNOSIS — E11.42 TYPE 2 DIABETES MELLITUS WITH DIABETIC POLYNEUROPATHY, WITHOUT LONG-TERM CURRENT USE OF INSULIN: ICD-10-CM

## 2025-03-29 NOTE — TELEPHONE ENCOUNTER
No care due was identified.  Health [facility] Embedded Care Due Messages. Reference number: [id number].   3/29/2025 8:09:38 AM CDT

## 2025-03-30 RX ORDER — SEMAGLUTIDE 1.34 MG/ML
1 INJECTION, SOLUTION SUBCUTANEOUS
Qty: 9 ML | Refills: 0 | Status: SHIPPED | OUTPATIENT
Start: 2025-03-30

## 2025-03-30 NOTE — TELEPHONE ENCOUNTER
Refill Decision Note   Tal Maldonado  is requesting a refill authorization.  Brief Assessment and Rationale for Refill:  Approve     Medication Therapy Plan:       Medication Reconciliation Completed: No   Comments:     No Care Gaps recommended.     Note composed:2:08 PM 03/30/2025

## 2025-04-15 ENCOUNTER — PATIENT MESSAGE (OUTPATIENT)
Dept: FAMILY MEDICINE | Facility: CLINIC | Age: 75
End: 2025-04-15
Payer: MEDICARE

## 2025-04-15 DIAGNOSIS — I10 ESSENTIAL HYPERTENSION: ICD-10-CM

## 2025-04-15 DIAGNOSIS — E11.42 TYPE 2 DIABETES MELLITUS WITH DIABETIC POLYNEUROPATHY, WITHOUT LONG-TERM CURRENT USE OF INSULIN: ICD-10-CM

## 2025-04-22 DIAGNOSIS — E11.42 TYPE 2 DIABETES MELLITUS WITH DIABETIC POLYNEUROPATHY, WITHOUT LONG-TERM CURRENT USE OF INSULIN: ICD-10-CM

## 2025-04-24 DIAGNOSIS — E11.42 TYPE 2 DIABETES MELLITUS WITH DIABETIC POLYNEUROPATHY, WITHOUT LONG-TERM CURRENT USE OF INSULIN: ICD-10-CM

## 2025-04-24 RX ORDER — SEMAGLUTIDE 1.34 MG/ML
1 INJECTION, SOLUTION SUBCUTANEOUS
Qty: 9 ML | Refills: 0 | Status: SHIPPED | OUTPATIENT
Start: 2025-04-24

## 2025-04-24 RX ORDER — SEMAGLUTIDE 0.68 MG/ML
INJECTION, SOLUTION SUBCUTANEOUS
Qty: 9 ML | Refills: 0 | OUTPATIENT
Start: 2025-04-24

## 2025-04-24 NOTE — TELEPHONE ENCOUNTER
No care due was identified.  [facility] Embedded Care Due Messages. Reference number: [id number].   4/24/2025 7:16:41 AM CDT

## 2025-04-24 NOTE — TELEPHONE ENCOUNTER
Refill Decision Note   Tal Maldonado  is requesting a refill authorization.  Brief Assessment and Rationale for Refill:  Quick Discontinue     Medication Therapy Plan:  Dosage d/c on 05/06/24 by PCP; [facility]      Comments:     Note composed:11:32 AM 04/24/2025

## 2025-05-01 ENCOUNTER — LAB VISIT (OUTPATIENT)
Dept: LAB | Facility: HOSPITAL | Age: 75
End: 2025-05-01
Attending: INTERNAL MEDICINE
Payer: MEDICARE

## 2025-05-01 DIAGNOSIS — I10 ESSENTIAL HYPERTENSION: ICD-10-CM

## 2025-05-01 DIAGNOSIS — E11.42 TYPE 2 DIABETES MELLITUS WITH DIABETIC POLYNEUROPATHY, WITHOUT LONG-TERM CURRENT USE OF INSULIN: ICD-10-CM

## 2025-05-01 LAB
ABSOLUTE EOSINOPHIL (OHS): 0.19 K/UL
ABSOLUTE MONOCYTE (OHS): 0.66 K/UL (ref 0.3–1)
ABSOLUTE NEUTROPHIL COUNT (OHS): 4.68 K/UL (ref 1.8–7.7)
ALBUMIN SERPL BCP-MCNC: 4.1 G/DL (ref 3.5–5.2)
ALBUMIN/CREAT UR: 40.8 UG/MG
ALP SERPL-CCNC: 70 UNIT/L (ref 40–150)
ALT SERPL W/O P-5'-P-CCNC: 57 UNIT/L (ref 10–44)
ANION GAP (OHS): 13 MMOL/L (ref 8–16)
AST SERPL-CCNC: 29 UNIT/L (ref 11–45)
BASOPHILS # BLD AUTO: 0.07 K/UL
BASOPHILS NFR BLD AUTO: 1 %
BILIRUB SERPL-MCNC: 1.2 MG/DL (ref 0.1–1)
BUN SERPL-MCNC: 12 MG/DL (ref 8–23)
CALCIUM SERPL-MCNC: 9.3 MG/DL (ref 8.7–10.5)
CHLORIDE SERPL-SCNC: 100 MMOL/L (ref 95–110)
CO2 SERPL-SCNC: 28 MMOL/L (ref 23–29)
CREAT SERPL-MCNC: 1 MG/DL (ref 0.5–1.4)
CREAT UR-MCNC: 157 MG/DL (ref 23–375)
EAG (OHS): 166 MG/DL (ref 68–131)
ERYTHROCYTE [DISTWIDTH] IN BLOOD BY AUTOMATED COUNT: 13 % (ref 11.5–14.5)
GFR SERPLBLD CREATININE-BSD FMLA CKD-EPI: >60 ML/MIN/1.73/M2
GLUCOSE SERPL-MCNC: 230 MG/DL (ref 70–110)
HBA1C MFR BLD: 7.4 % (ref 4–5.6)
HCT VFR BLD AUTO: 47.8 % (ref 40–54)
HGB BLD-MCNC: 16 GM/DL (ref 14–18)
IMM GRANULOCYTES # BLD AUTO: 0.04 K/UL (ref 0–0.04)
IMM GRANULOCYTES NFR BLD AUTO: 0.6 % (ref 0–0.5)
LYMPHOCYTES # BLD AUTO: 1.52 K/UL (ref 1–4.8)
MCH RBC QN AUTO: 31.6 PG (ref 27–31)
MCHC RBC AUTO-ENTMCNC: 33.5 G/DL (ref 32–36)
MCV RBC AUTO: 95 FL (ref 82–98)
MICROALBUMIN UR-MCNC: 64 UG/ML (ref ?–5000)
NUCLEATED RBC (/100WBC) (OHS): 0 /100 WBC
PLATELET # BLD AUTO: 133 K/UL (ref 150–450)
PMV BLD AUTO: 11.1 FL (ref 9.2–12.9)
POTASSIUM SERPL-SCNC: 4.1 MMOL/L (ref 3.5–5.1)
PROT SERPL-MCNC: 6.8 GM/DL (ref 6–8.4)
RBC # BLD AUTO: 5.06 M/UL (ref 4.6–6.2)
RELATIVE EOSINOPHIL (OHS): 2.7 %
RELATIVE LYMPHOCYTE (OHS): 21.2 % (ref 18–48)
RELATIVE MONOCYTE (OHS): 9.2 % (ref 4–15)
RELATIVE NEUTROPHIL (OHS): 65.3 % (ref 38–73)
SODIUM SERPL-SCNC: 141 MMOL/L (ref 136–145)
WBC # BLD AUTO: 7.16 K/UL (ref 3.9–12.7)

## 2025-05-01 PROCEDURE — 85025 COMPLETE CBC W/AUTO DIFF WBC: CPT

## 2025-05-01 PROCEDURE — 36415 COLL VENOUS BLD VENIPUNCTURE: CPT | Mod: PO

## 2025-05-01 PROCEDURE — 83036 HEMOGLOBIN GLYCOSYLATED A1C: CPT

## 2025-05-01 PROCEDURE — 82570 ASSAY OF URINE CREATININE: CPT

## 2025-05-01 PROCEDURE — 82040 ASSAY OF SERUM ALBUMIN: CPT | Mod: PO

## 2025-05-04 ENCOUNTER — RESULTS FOLLOW-UP (OUTPATIENT)
Dept: FAMILY MEDICINE | Facility: CLINIC | Age: 75
End: 2025-05-04

## 2025-05-06 ENCOUNTER — OFFICE VISIT (OUTPATIENT)
Dept: FAMILY MEDICINE | Facility: CLINIC | Age: 75
End: 2025-05-06
Payer: MEDICARE

## 2025-05-06 VITALS
HEIGHT: 70 IN | WEIGHT: 275.44 LBS | OXYGEN SATURATION: 98 % | SYSTOLIC BLOOD PRESSURE: 132 MMHG | BODY MASS INDEX: 39.43 KG/M2 | DIASTOLIC BLOOD PRESSURE: 68 MMHG | HEART RATE: 78 BPM

## 2025-05-06 DIAGNOSIS — R74.8 ELEVATED LIVER ENZYMES: Primary | ICD-10-CM

## 2025-05-06 DIAGNOSIS — E11.29 TYPE 2 DIABETES MELLITUS WITH DIABETIC MICROALBUMINURIA, WITHOUT LONG-TERM CURRENT USE OF INSULIN: ICD-10-CM

## 2025-05-06 DIAGNOSIS — E66.01 CLASS 2 SEVERE OBESITY DUE TO EXCESS CALORIES WITH SERIOUS COMORBIDITY AND BODY MASS INDEX (BMI) OF 39.0 TO 39.9 IN ADULT: ICD-10-CM

## 2025-05-06 DIAGNOSIS — E78.2 MIXED HYPERLIPIDEMIA: ICD-10-CM

## 2025-05-06 DIAGNOSIS — R80.9 TYPE 2 DIABETES MELLITUS WITH DIABETIC MICROALBUMINURIA, WITHOUT LONG-TERM CURRENT USE OF INSULIN: ICD-10-CM

## 2025-05-06 DIAGNOSIS — N40.1 BENIGN PROSTATIC HYPERPLASIA WITH NOCTURIA: ICD-10-CM

## 2025-05-06 DIAGNOSIS — M10.09 ACUTE IDIOPATHIC GOUT OF MULTIPLE SITES: ICD-10-CM

## 2025-05-06 DIAGNOSIS — R35.1 BENIGN PROSTATIC HYPERPLASIA WITH NOCTURIA: ICD-10-CM

## 2025-05-06 DIAGNOSIS — M10.49 OTHER SECONDARY ACUTE GOUT OF MULTIPLE SITES: ICD-10-CM

## 2025-05-06 DIAGNOSIS — G47.33 OSA ON CPAP: ICD-10-CM

## 2025-05-06 DIAGNOSIS — E66.812 CLASS 2 SEVERE OBESITY DUE TO EXCESS CALORIES WITH SERIOUS COMORBIDITY AND BODY MASS INDEX (BMI) OF 39.0 TO 39.9 IN ADULT: ICD-10-CM

## 2025-05-06 DIAGNOSIS — E11.42 TYPE 2 DIABETES MELLITUS WITH DIABETIC POLYNEUROPATHY, WITHOUT LONG-TERM CURRENT USE OF INSULIN: ICD-10-CM

## 2025-05-06 DIAGNOSIS — E03.4 HYPOTHYROIDISM DUE TO ACQUIRED ATROPHY OF THYROID: ICD-10-CM

## 2025-05-06 DIAGNOSIS — D69.6 THROMBOCYTOPENIA: ICD-10-CM

## 2025-05-06 DIAGNOSIS — I10 ESSENTIAL HYPERTENSION: ICD-10-CM

## 2025-05-06 PROCEDURE — G2211 COMPLEX E/M VISIT ADD ON: HCPCS | Mod: S$PBB,,, | Performed by: INTERNAL MEDICINE

## 2025-05-06 PROCEDURE — 99999 PR PBB SHADOW E&M-EST. PATIENT-LVL IV: CPT | Mod: PBBFAC,,, | Performed by: INTERNAL MEDICINE

## 2025-05-06 PROCEDURE — 99214 OFFICE O/P EST MOD 30 MIN: CPT | Mod: S$PBB,,, | Performed by: INTERNAL MEDICINE

## 2025-05-06 PROCEDURE — 99214 OFFICE O/P EST MOD 30 MIN: CPT | Mod: PBBFAC,PO | Performed by: INTERNAL MEDICINE

## 2025-05-06 RX ORDER — GLIPIZIDE 10 MG/1
10 TABLET, FILM COATED, EXTENDED RELEASE ORAL
Qty: 90 TABLET | Refills: 3 | Status: SHIPPED | OUTPATIENT
Start: 2025-05-06

## 2025-05-06 RX ORDER — FINASTERIDE 5 MG/1
5 TABLET, FILM COATED ORAL DAILY
Qty: 90 TABLET | Refills: 3 | Status: SHIPPED | OUTPATIENT
Start: 2025-05-06

## 2025-05-06 RX ORDER — AMLODIPINE BESYLATE 10 MG/1
10 TABLET ORAL DAILY
Qty: 90 TABLET | Refills: 3 | Status: SHIPPED | OUTPATIENT
Start: 2025-05-06

## 2025-05-06 RX ORDER — METFORMIN HYDROCHLORIDE 1000 MG/1
1000 TABLET ORAL 2 TIMES DAILY WITH MEALS
Qty: 180 TABLET | Refills: 3 | Status: SHIPPED | OUTPATIENT
Start: 2025-05-06

## 2025-05-06 RX ORDER — ALLOPURINOL 100 MG/1
200 TABLET ORAL DAILY
Qty: 180 TABLET | Refills: 3 | Status: SHIPPED | OUTPATIENT
Start: 2025-05-06

## 2025-05-06 RX ORDER — LEVOTHYROXINE SODIUM 150 UG/1
150 TABLET ORAL
Qty: 90 TABLET | Refills: 3 | Status: SHIPPED | OUTPATIENT
Start: 2025-05-06

## 2025-05-06 RX ORDER — LISINOPRIL AND HYDROCHLOROTHIAZIDE 12.5; 2 MG/1; MG/1
1 TABLET ORAL 2 TIMES DAILY
Qty: 180 TABLET | Refills: 3 | Status: SHIPPED | OUTPATIENT
Start: 2025-05-06 | End: 2026-05-06

## 2025-05-06 RX ORDER — ATORVASTATIN CALCIUM 80 MG/1
80 TABLET, FILM COATED ORAL NIGHTLY
Qty: 90 TABLET | Refills: 3 | Status: SHIPPED | OUTPATIENT
Start: 2025-05-06

## 2025-05-06 RX ORDER — TAMSULOSIN HYDROCHLORIDE 0.4 MG/1
0.4 CAPSULE ORAL DAILY
Qty: 90 CAPSULE | Refills: 3 | Status: SHIPPED | OUTPATIENT
Start: 2025-05-06

## 2025-05-06 RX ORDER — DAPAGLIFLOZIN 5 MG/1
5 TABLET, FILM COATED ORAL DAILY
Qty: 90 TABLET | Refills: 3 | Status: SHIPPED | OUTPATIENT
Start: 2025-05-06

## 2025-05-06 RX ORDER — SEMAGLUTIDE 1.34 MG/ML
1 INJECTION, SOLUTION SUBCUTANEOUS
Qty: 9 ML | Refills: 3 | Status: SHIPPED | OUTPATIENT
Start: 2025-05-06 | End: 2025-05-08 | Stop reason: DRUGHIGH

## 2025-05-06 NOTE — PROGRESS NOTES
"[facility]  Primary Care   [address]       Patient ID: Tal Maldonado     Chief Complaint:   Chief Complaint   Patient presents with    Annual Exam     General wellness exam        HPI:  Annual exam and overall he is feeling well.  We did review his labs and he is upset because his A1c is higher than his baseline but he has already made steps to correct that and we will repeat that lab in 6 months.  ALT is slightly high and consistent and his platelets are slightly low and also consistent.  We previously did an ultrasound of his abdomen to check for signs of cirrhosis but we could only find fatty liver.  Since we do have hepatology here, I placed an as-needed referral for him to activate if he would like because I think he could benefit from a more comprehensive workup of his liver.  He does drink 2 glasses of wine most nights per week and I would like him to not go over that and perhaps maybe decrease that as well.  His vital signs look very good.  Urine microalbumin is slightly high but consistent and we are just going to monitor that for now.  I may increase lisinopril in the future with the corresponding decreasing the amlodipine.    Review of Systems       Negative     Objective:      Physical Exam   Physical Exam       Obese    Vitals:   Vitals:    05/06/25 0835   BP: 132/68   Pulse: 78   SpO2: 98%   Weight: 125 kg (275 lb 7.4 oz)   Height: 5' 10" (1.778 m)        Assessment:           Plan:       Tal Maldonado  was seen today for follow-up and may need lab work.    Diagnoses and all orders for this visit:    Tal was seen today for annual exam.    Diagnoses and all orders for this visit:    Elevated liver enzymes  -     Ambulatory referral/consult to Hepatology; Future  Likely due to a combination of fatty liver and alcohol.  Monitor labs.    Type 2 diabetes mellitus with diabetic polyneuropathy, without long-term current use of insulin  -     Lipid panel; Future  -     " Hemoglobin A1C; Future  -     Comprehensive Metabolic Panel; Future  -     atorvastatin (LIPITOR) 80 MG tablet; Take 1 tablet (80 mg total) by mouth every evening.  -     dapagliflozin propanediol (FARXIGA) 5 mg Tab tablet; Take 1 tablet (5 mg total) by mouth once daily.  -     glipiZIDE (GLUCOTROL) 10 MG TR24; Take 1 tablet (10 mg total) by mouth daily with breakfast.  -     metFORMIN (GLUCOPHAGE) 1000 MG tablet; Take 1 tablet (1,000 mg total) by mouth 2 (two) times daily with meals.  -     semaglutide (OZEMPIC) 1 mg/dose (4 mg/3 mL); Inject 1 mg into the skin every 7 days.  Diabetes is technically controlled but he has already taken steps to lower his A1c.  Continue glipizide and metformin and Farxiga and Ozempic for now.  Neuropathy seems stable    Essential hypertension  -     CBC Auto Differential; Future  -     Comprehensive Metabolic Panel; Future  -     amLODIPine (NORVASC) 10 MG tablet; Take 1 tablet (10 mg total) by mouth once daily.  -     lisinopriL-hydrochlorothiazide (PRINZIDE,ZESTORETIC) 20-12.5 mg per tablet; Take 1 tablet by mouth 2 (two) times a day. TAKE 1 TABLET BY MOUTH TWICE DAILY  Controlled with amlodipine and lisinopril/hydrochlorothiazide but we may make adjustments based on urine microalbumin in the future.    Hypothyroidism due to acquired atrophy of thyroid  -     levothyroxine (SYNTHROID) 150 MCG tablet; Take 1 tablet (150 mcg total) by mouth before breakfast.  -     TSH; Future  Controlled with levothyroxine and monitor TSH    Mixed hyperlipidemia  -     atorvastatin (LIPITOR) 80 MG tablet; Take 1 tablet (80 mg total) by mouth every evening.  Controlled with the atorvastatin and monitor LDL    WINSOME on CPAP  Compliant with CPAP getting good benefit    Acute idiopathic gout of multiple sites  -     Uric Acid; Future  Controlled with allopurinol    Thrombocytopenia  -     Ambulatory referral/consult to Hepatology; Future  Monitor labs and consider seeing hepatology    Class 2 severe  obesity due to excess calories with serious comorbidity and body mass index (BMI) of 39.0 to 39.9 in adult  -     Ambulatory referral/consult to Hepatology; Future    Type 2 diabetes mellitus with diabetic microalbuminuria, without long-term current use of insulin  -     Lipid panel; Future  Diabetes is technically controlled but urine microalbumin is slightly high so we will monitor this through time.  Continue lisinopril    Other secondary acute gout of multiple sites  -     allopurinoL (ZYLOPRIM) 100 MG tablet; Take 2 tablets (200 mg total) by mouth once daily.  Controlled with allopurinol    Benign prostatic hyperplasia with nocturia  -     finasteride (PROSCAR) 5 mg tablet; Take 1 tablet (5 mg total) by mouth once daily.  -     tamsulosin (FLOMAX) 0.4 mg Cap; Take 1 capsule (0.4 mg total) by mouth once daily.  Symptoms controlled with Proscar and Flomax    Visit today included increased complexity associated with the care of the episodic problem hypertension Diabetes hyperlipidemia WINSOME Thrombocytopenia  addressed and managing the longitudinal care of the patient due to the serious and/or complex managed problem(s) .           Marlon Iglesias MD

## 2025-05-07 ENCOUNTER — PATIENT MESSAGE (OUTPATIENT)
Dept: FAMILY MEDICINE | Facility: CLINIC | Age: 75
End: 2025-05-07
Payer: MEDICARE

## 2025-05-08 ENCOUNTER — TELEPHONE (OUTPATIENT)
Facility: CLINIC | Age: 75
End: 2025-05-08

## 2025-05-08 ENCOUNTER — OFFICE VISIT (OUTPATIENT)
Facility: CLINIC | Age: 75
End: 2025-05-08
Payer: MEDICARE

## 2025-05-08 ENCOUNTER — PATIENT MESSAGE (OUTPATIENT)
Dept: FAMILY MEDICINE | Facility: CLINIC | Age: 75
End: 2025-05-08
Payer: MEDICARE

## 2025-05-08 ENCOUNTER — PATIENT MESSAGE (OUTPATIENT)
Facility: CLINIC | Age: 75
End: 2025-05-08

## 2025-05-08 VITALS — BODY MASS INDEX: 40.49 KG/M2 | WEIGHT: 273.38 LBS | HEIGHT: 69 IN

## 2025-05-08 DIAGNOSIS — K76.0 METABOLIC DYSFUNCTION-ASSOCIATED STEATOTIC LIVER DISEASE AND INCREASED ALCOHOL INTAKE (METALD): Primary | ICD-10-CM

## 2025-05-08 DIAGNOSIS — E11.42 TYPE 2 DIABETES MELLITUS WITH DIABETIC POLYNEUROPATHY, WITHOUT LONG-TERM CURRENT USE OF INSULIN: ICD-10-CM

## 2025-05-08 DIAGNOSIS — E11.42 TYPE 2 DIABETES MELLITUS WITH DIABETIC POLYNEUROPATHY, WITHOUT LONG-TERM CURRENT USE OF INSULIN: Primary | ICD-10-CM

## 2025-05-08 DIAGNOSIS — F10.90 METABOLIC DYSFUNCTION-ASSOCIATED STEATOTIC LIVER DISEASE AND INCREASED ALCOHOL INTAKE (METALD): Primary | ICD-10-CM

## 2025-05-08 DIAGNOSIS — E78.2 MIXED HYPERLIPIDEMIA: ICD-10-CM

## 2025-05-08 DIAGNOSIS — E66.01 MORBID OBESITY WITH BMI OF 40.0-44.9, ADULT: ICD-10-CM

## 2025-05-08 DIAGNOSIS — I10 ESSENTIAL HYPERTENSION: ICD-10-CM

## 2025-05-08 DIAGNOSIS — R74.8 ELEVATED LIVER ENZYMES: ICD-10-CM

## 2025-05-08 DIAGNOSIS — E03.4 HYPOTHYROIDISM DUE TO ACQUIRED ATROPHY OF THYROID: ICD-10-CM

## 2025-05-08 DIAGNOSIS — E66.812 CLASS 2 SEVERE OBESITY DUE TO EXCESS CALORIES WITH SERIOUS COMORBIDITY AND BODY MASS INDEX (BMI) OF 39.0 TO 39.9 IN ADULT: ICD-10-CM

## 2025-05-08 DIAGNOSIS — E66.01 CLASS 2 SEVERE OBESITY DUE TO EXCESS CALORIES WITH SERIOUS COMORBIDITY AND BODY MASS INDEX (BMI) OF 39.0 TO 39.9 IN ADULT: ICD-10-CM

## 2025-05-08 DIAGNOSIS — D69.6 THROMBOCYTOPENIA: ICD-10-CM

## 2025-05-08 PROCEDURE — 99213 OFFICE O/P EST LOW 20 MIN: CPT | Mod: PBBFAC,PN | Performed by: NURSE PRACTITIONER

## 2025-05-08 PROCEDURE — 99999 PR PBB SHADOW E&M-EST. PATIENT-LVL III: CPT | Mod: PBBFAC,,, | Performed by: NURSE PRACTITIONER

## 2025-05-08 RX ORDER — LEVOTHYROXINE SODIUM 150 UG/1
150 TABLET ORAL
Qty: 90 TABLET | Refills: 3 | OUTPATIENT
Start: 2025-05-08

## 2025-05-08 RX ORDER — SEMAGLUTIDE 2.68 MG/ML
2 INJECTION, SOLUTION SUBCUTANEOUS
Qty: 9 ML | Refills: 3 | Status: SHIPPED | OUTPATIENT
Start: 2025-05-08 | End: 2026-05-08

## 2025-05-08 NOTE — TELEPHONE ENCOUNTER
No care due was identified.  Health [facility] Embedded Care Due Messages. Reference number: [id number].   5/08/2025 7:38:35 AM CDT

## 2025-05-08 NOTE — TELEPHONE ENCOUNTER
All appt's were schld with pt as requested by provider.    Marifer    ----- Message from Emma Martins NP sent at 5/8/2025 12:53 PM CDT -----  Please schedule Fibroscan and visit on Thurs, May 22. Fibroscan at 1 and visit at 130, thanks.

## 2025-05-08 NOTE — TELEPHONE ENCOUNTER
Pt was called and schld for US.    Marifer     ----- Message from Emma Martins NP sent at 5/8/2025 12:57 PM CDT -----  Please schedule US prior to our next visit if possible

## 2025-05-08 NOTE — PROGRESS NOTES
[facility] - New Patient     REFERRING PROVIDER: Dr. Marlon Iglesias  PCP: Marlon Iglesias MD    Chief Complaint: Fatty liver, elevated liver enzymes      HISTORY     This is a 74 y.o. male referred for evaluation of above.    He was told that he had fatty liver 30 years ago. Had liver biopsy though does not recall specific fibrosis staging.      Hepatic steatosis noted on abd US 6/2020. Liver enlarged, spleen UNL.    Review of labs:  - Transaminases: ALT intermittently elevated, 40-50s  - Alk phos: WNL  - Synthetic liver function: WNL  - Platelets: chronically low, 130s (reports low x 30 years)    Workup thus far:  Serologies:   Lab Results   Component Value Date    HEPBSAG Negative 09/23/2020    HEPBIGM Negative 09/23/2020    HEPCAB Negative 09/23/2020    HEPAIGM Negative 09/23/2020      Risk factors for MASLD:   Weight -- Body mass index is 40.37 kg/m².   Weight is down a few lb                       Dyslipidemia -- yes   Hypertension -- yes                              Insulin resistance / diabetes -- yes, on ozempic  Lab Results   Component Value Date    HGBA1C 7.4 (H) 05/01/2025         Other -- WINSOME, hypothyroidism    *Noted h/o CAD     Alcohol use: 10 oz wine 4-5 x week + shot of bourbon 4-5 x week     Denies illicit drug use.     Family history:  No family history of liver disease, cirrhosis, or liver cancer.  CV disease, DM    Meds/supplements:  None concerning from liver standpoint    Social history:  Occupation: used to be in the wine business   Exercise: not currently  Lives with: spouse    Denies recent illness or infection.    No symptoms of hepatic decompensation including jaundice, ascites, cognitive problems to suggest hepatic encephalopathy, or GI bleeding.         Past medical history, surgical history, problem list, family history, social history, allergies: Reviewed and updated in the appropriate section of the electronic medical record.      Current Medications[1]  Medication list  "reviewed and updated.      Review of Systems - as per HPI  Constitutional: Negative for unexpected weight change.   Respiratory: Negative for shortness of breath.    Cardiovascular: Negative for leg swelling.  Gastrointestinal: Negative for abdominal distention or abdominal pain. Negative for melena or hematemesis.  Musculoskeletal: Negative for myalgias.    Skin: Negative for jaundice or itching.  Neurological: Negative for confusion or slowed mentation. Negative for tremors.   Hematological: Does not bruise/bleed easily.       Physical Exam   Constitutional: No distress. Alert and oriented.  Eyes: No scleral icterus.   Pulmonary/Chest: Respiratory effort normal. No respiratory distress.   Abdominal: No distension, no ascites appreciated.   Extremities: No edema.   Neurological: No tremor.   Skin: No jaundice.   Psychiatric: Normal mood and affect. Speech, behavior, and thought content normal.       LABS & DIAGNOSTIC STUDIES     I have personally reviewed pertinent laboratory findings:    Lab Results   Component Value Date    ALT 57 (H) 05/01/2025    AST 29 05/01/2025    ALKPHOS 70 05/01/2025    BILITOT 1.2 (H) 05/01/2025    ALBUMIN 4.1 05/01/2025    INR 1.07 12/17/2020       Lab Results   Component Value Date    WBC 7.16 05/01/2025    HGB 16.0 05/01/2025    HCT 47.8 05/01/2025    MCV 95 05/01/2025     (L) 05/01/2025       Lab Results   Component Value Date     05/01/2025    K 4.1 05/01/2025    BUN 12 05/01/2025    CREATININE 1.0 05/01/2025    ESTGFRAFRICA >60.0 03/19/2021    EGFRNONAA >60.0 03/19/2021       Lab Results   Component Value Date    HEPBSAG Negative 09/23/2020    HEPCAB Negative 09/23/2020       No results found for: "AFP"      I have personally reviewed the following result reports:  Abdominal US - 6/10/20        ASSESSMENT & PLAN     74 y.o. male with:    1. Metabolic dysfunction-associated steatotic liver disease and increased alcohol intake (MetALD)    2. Elevated liver enzymes    3. " Thrombocytopenia    4. Class 2 severe obesity due to excess calories with serious comorbidity and body mass index (BMI) of 39.0 to 39.9 in adult    5. Essential hypertension    6. Mixed hyperlipidemia    7. Type 2 diabetes mellitus with diabetic polyneuropathy, without long-term current use of insulin    8. Morbid obesity with BMI of 40.0-44.9, adult          FIB-4 Calculation: 2.14 at 5/1/2025  9:45 AM    FIB-4 below 1.30 is considered as low-risk for advanced fibrosis  FIB-4 over 2.67 is considered as high-risk for advanced fibrosis  FIB-4 values between 1.30 and 2.67 are considered as intermediate-risk of advanced fibrosis for ages 36-64.   For ages > 64 the cut-off for low-risk goes to < 2.      Thrombocytopenia noted (?advanced fibrosis) though this is reportedly chronic; alcohol could also be contributing.     Recommendations:   Obtain Fibroscan for fibrosis staging; accuracy could be limited by BMI/body habitus and may need MRI elasto. Will also check ELF.   Start serologic workup to rule out other causes of liver disease.   Update abd US now  Recommend weight loss goal of 7-10% (about 25 lb). -- may have more success with higher dose of ozempic vs mounjaro. Can discuss with PCP.   Mediterranean diet - diet should be low in carbohydrates, added sugars, and processed foods. Recommend increasing protein and fiber intake.   150 min/week of moderate exercise (aerobic + resistance), as tolerated.  Maintain good control of blood pressure, cholesterol, and blood sugar.   If no advanced fibrosis, should limit alcohol to max 2 standard drinks/day. Do not recommend daily alcohol use.   Recommend vaccination for hepatitis A/B if not immune.      Orders Placed This Encounter   Procedures    FibroScan Transplant Hepatology(Vibration Controlled Transient Elastography)    US Abdomen Limited    Alpha-1-Antitrypsin    Ceruloplasmin    Ferritin    Iron and TIBC    Enhanced Liver Fibrosis (ELF)    Hepatitis B Surface Ab,  Qualitative    Hepatitis A antibody, IgG    Hepatitis B Core Antibody, Total       Return to clinic same day as Fibroscan.       Thank you for allowing me to participate in the care of Tal Maldonado    Emma Martins, [facility]  Hepatology        Duration of encounter: 45 min  This includes face to face time and non-face to face time preparing to see the patient (eg, review of tests), obtaining and/or reviewing separately obtained history, documenting clinical information in the electronic or other health record, independently interpreting results and communicating results to the patient/family/caregiver, or Care coordination.         [1]   Current Outpatient Medications   Medication Sig Dispense Refill    allopurinoL (ZYLOPRIM) 100 MG tablet Take 2 tablets (200 mg total) by mouth once daily. 180 tablet 3    amLODIPine (NORVASC) 10 MG tablet Take 1 tablet (10 mg total) by mouth once daily. 90 tablet 3    aspirin (ECOTRIN) 81 MG EC tablet Take 1 tablet (81 mg total) by mouth once daily.  0    atorvastatin (LIPITOR) 80 MG tablet Take 1 tablet (80 mg total) by mouth every evening. 90 tablet 3    blood-glucose meter kit To check BG 2 times daily, to use with insurance preferred meter 1 each 0    calcipotriene-betamethasone 0.005-0.064 % Crea Apply 1 application topically once daily at 6am. 60 g 3    clotrimazole-betamethasone 1-0.05% (LOTRISONE) cream Apply topically 2 (two) times daily.      dapagliflozin propanediol (FARXIGA) 5 mg Tab tablet Take 1 tablet (5 mg total) by mouth once daily. 90 tablet 3    finasteride (PROSCAR) 5 mg tablet Take 1 tablet (5 mg total) by mouth once daily. 90 tablet 3    FREESTYLE LITE STRIPS Str USE AS DIRECTED TO TEST ONCE DAILY 100 strip 3    glipiZIDE (GLUCOTROL) 10 MG TR24 Take 1 tablet (10 mg total) by mouth daily with breakfast. 90 tablet 3    glucosamine-chondroitin 500-400 mg tablet Take 1 tablet by mouth 3 (three) times daily.      indomethacin (INDOCIN) 50 MG capsule Take 1  "capsule (50 mg total) by mouth 2 (two) times daily as needed (gout flare). 30 capsule 1    lancets (ACCU-CHEK FASTCLIX LANCET DRUM) Misc Inject 1 Units into the skin 2 (two) times a day. 200 each 11    LANCETS MISC by Misc.(Non-Drug; Combo Route) route.      levothyroxine (SYNTHROID) 150 MCG tablet Take 1 tablet (150 mcg total) by mouth before breakfast. 90 tablet 3    lisinopriL-hydrochlorothiazide (PRINZIDE,ZESTORETIC) 20-12.5 mg per tablet Take 1 tablet by mouth 2 (two) times a day. TAKE 1 TABLET BY MOUTH TWICE DAILY 180 tablet 3    metFORMIN (GLUCOPHAGE) 1000 MG tablet Take 1 tablet (1,000 mg total) by mouth 2 (two) times daily with meals. 180 tablet 3    metoprolol succinate (TOPROL-XL) 200 MG 24 hr tablet Take 1 tablet (200 mg total) by mouth once daily. 90 tablet 3    omega-3 fatty acids/fish oil (FISH OIL-OMEGA-3 FATTY ACIDS) 300-1,000 mg capsule Take by mouth once daily.      pen needle, diabetic (BD NIC 2ND GEN PEN NEEDLE) 32 gauge x 5/32" Ndle USE ONCE DAILY AS DIRECTED 100 each 3    prednisoLONE-moxiflox-bromfen 1-0.5-0.075 % DrpS Apply 1 drop to eye 3 (three) times daily. 5 mL 3    prednisoLONE-moxiflox-bromfen 1-0.5-0.075 % DrpS Apply 1 drop to eye 3 (three) times daily. 5 mL 3    semaglutide (OZEMPIC) 1 mg/dose (4 mg/3 mL) Inject 1 mg into the skin every 7 days. 9 mL 3    tamsulosin (FLOMAX) 0.4 mg Cap Take 1 capsule (0.4 mg total) by mouth once daily. 90 capsule 3    triamcinolone acetonide 0.1% (KENALOG) 0.1 % cream Apply topically 2 (two) times daily. 453.6 g 0     No current facility-administered medications for this visit.     "

## 2025-05-08 NOTE — PATIENT INSTRUCTIONS
"Fibroscan to assess for liver scarring/fibrosis from fatty liver  Labs to rule out a few other causes of liver problems        Metabolic-Dysfunction Associated Steatotic Liver Disease (MASLD, "fatty liver"):      Resources for patients:   https://mash.pocn.com/patient-resources/  [email or web address]      Lifestyle changes can help to reverse MASLD and prevent liver damage:  1. Weight loss goal of 25 lbs    *Weight loss of 5% has been shown to reduce fat in the liver  *Weight loss of 7-10% has been shown to improve liver fat, inflammation from fatty liver, and even liver fibrosis (scarring/damage)    2. Mediterranean diet is recommended though decreasing daily calories is needed for weight loss. Avoid drinking beverages with sugar. Limit processed foods. Meals/snacks should be high in protein/fiber, low in carbohydrates/sugar.     If interested in seeing a dietician to create a weight loss plan, contact the dietician team at [facility]: [email or web address].  You can also call to schedule a consult with a dietician: [phone]  *Virtual visits are available with one of our dieticians.     3. 150 min/week of moderate exercise (aerobic + resistance), as tolerated. Studies have shown that exercise improves fatty liver even without weight loss.     4. Recommend good control of blood pressure, cholesterol, and blood sugar levels as these are risk factors for MASLD. Cholesterol lowering medications including statins are typically safe and beneficial (even if liver enzymes are elevated).    5. Limit alcohol consumption, no more than 2 serving(s) of alcohol in any day (1 serving is 5 ounces of wine, 12 ounces of beer, or 1.5 ounces of liquor). Do not recommend daily alcohol use or drinking alcohol most days per week.    In some people, MASLD can progress to steatohepatitis or MASH (inflammatory fatty liver). This can cause liver scarring or damage (fibrosis) which can progress to cirrhosis. Cirrhosis " increases risk of liver cancer and liver failure. Lifestyle changes now can help to decrease this risk.       DIET TIPS:  ADD OLIVE OIL (in salads or when cooking). Avocado oil is great option too.   ADD COFFEE. Black coffee (low caloric, 2-3 cups per day) may reduce liver fat.   BE MINDFUL OF CARBOHYDRATES AND ADDED SUGARS. Try to limit your carbohydrate intake to LESS than 30-45 grams of carbs with a meal or LESS than 5-10 grams with any snack. Dressings, seasonings, condiments, etc should have less than 2 grams of sugar. Avoid fried foods and processed foods.   KEEP A FOOD DIARY. Use MyFitness Pal or LOSE IT [place] to add up your carbs and calories throughout the day.  LIMIT WHAT YOU ARE DRINKING. Avoid beverages with carbs/sugar as this can lead to high blood sugar and weight gain (soda, sweet tea, fruit juice, lemonade, sports drinks, milk).

## 2025-05-16 ENCOUNTER — HOSPITAL ENCOUNTER (OUTPATIENT)
Dept: RADIOLOGY | Facility: HOSPITAL | Age: 75
Discharge: HOME | End: 2025-05-16
Attending: NURSE PRACTITIONER
Payer: MEDICARE

## 2025-05-16 DIAGNOSIS — F10.90 METABOLIC DYSFUNCTION-ASSOCIATED STEATOTIC LIVER DISEASE AND INCREASED ALCOHOL INTAKE (METALD): ICD-10-CM

## 2025-05-16 DIAGNOSIS — K76.0 METABOLIC DYSFUNCTION-ASSOCIATED STEATOTIC LIVER DISEASE AND INCREASED ALCOHOL INTAKE (METALD): ICD-10-CM

## 2025-05-16 PROCEDURE — 76705 ECHO EXAM OF ABDOMEN: CPT | Mod: TC,PO

## 2025-05-16 PROCEDURE — 76705 ECHO EXAM OF ABDOMEN: CPT | Mod: 26,,, | Performed by: STUDENT IN AN ORGANIZED HEALTH CARE EDUCATION/TRAINING PROGRAM

## 2025-05-22 ENCOUNTER — PROCEDURE VISIT (OUTPATIENT)
Facility: CLINIC | Age: 75
End: 2025-05-22
Payer: MEDICARE

## 2025-05-22 ENCOUNTER — OFFICE VISIT (OUTPATIENT)
Facility: CLINIC | Age: 75
End: 2025-05-22
Payer: MEDICARE

## 2025-05-22 VITALS — HEIGHT: 69 IN | WEIGHT: 272 LBS | BODY MASS INDEX: 40.29 KG/M2

## 2025-05-22 DIAGNOSIS — F10.90 METABOLIC DYSFUNCTION-ASSOCIATED STEATOTIC LIVER DISEASE AND INCREASED ALCOHOL INTAKE (METALD): ICD-10-CM

## 2025-05-22 DIAGNOSIS — K76.0 METABOLIC DYSFUNCTION-ASSOCIATED STEATOTIC LIVER DISEASE AND INCREASED ALCOHOL INTAKE (METALD): ICD-10-CM

## 2025-05-22 DIAGNOSIS — I10 ESSENTIAL HYPERTENSION: ICD-10-CM

## 2025-05-22 DIAGNOSIS — K76.0 METABOLIC DYSFUNCTION-ASSOCIATED STEATOTIC LIVER DISEASE AND INCREASED ALCOHOL INTAKE (METALD): Primary | ICD-10-CM

## 2025-05-22 DIAGNOSIS — D69.6 THROMBOCYTOPENIA: ICD-10-CM

## 2025-05-22 DIAGNOSIS — E66.01 MORBID OBESITY WITH BMI OF 40.0-44.9, ADULT: ICD-10-CM

## 2025-05-22 DIAGNOSIS — E78.2 MIXED HYPERLIPIDEMIA: ICD-10-CM

## 2025-05-22 DIAGNOSIS — R16.1 SPLENOMEGALY: ICD-10-CM

## 2025-05-22 DIAGNOSIS — E11.42 TYPE 2 DIABETES MELLITUS WITH DIABETIC POLYNEUROPATHY, WITHOUT LONG-TERM CURRENT USE OF INSULIN: ICD-10-CM

## 2025-05-22 DIAGNOSIS — R74.8 ELEVATED LIVER ENZYMES: ICD-10-CM

## 2025-05-22 DIAGNOSIS — F10.90 METABOLIC DYSFUNCTION-ASSOCIATED STEATOTIC LIVER DISEASE AND INCREASED ALCOHOL INTAKE (METALD): Primary | ICD-10-CM

## 2025-05-22 PROCEDURE — 99999 PR PBB SHADOW E&M-EST. PATIENT-LVL II: CPT | Mod: PBBFAC,,, | Performed by: NURSE PRACTITIONER

## 2025-05-22 PROCEDURE — 91200 LIVER ELASTOGRAPHY: CPT | Mod: 26,S$PBB,, | Performed by: NURSE PRACTITIONER

## 2025-05-22 PROCEDURE — 99212 OFFICE O/P EST SF 10 MIN: CPT | Mod: PBBFAC,PN | Performed by: NURSE PRACTITIONER

## 2025-05-22 PROCEDURE — 91200 LIVER ELASTOGRAPHY: CPT | Mod: PBBFAC,PN | Performed by: NURSE PRACTITIONER

## 2025-05-22 NOTE — PROGRESS NOTES
[facility] - Established Patient    Last Clinic Visit: 5/8/25    Chief Complaint: Follow-up for fatty liver       HISTORY     This is a 74 y.o. male with PMH noted below, here for follow-up of Met-ALD.      He was told that he had fatty liver 30 years ago. Had liver biopsy though does not recall specific fibrosis staging.      Imaging has shown hepatic steatosis and hepatosplenomegaly.    Risk factors for MASLD include:   BMI >40  HTN, HLD, DM, WINSOME, hypothyroidism   *Noted h/o CAD    Alcohol use: 10 oz wine 4-5 x week + shot of bourbon 4-5 x week     His ALT has been intermittently elevated, 40-50s. Liver function is normal. Platelets chronically low x 30 years.     Serologic workup has been negative for Camacho's, alpha-1 antitrypsin deficiency, hemochromatosis, and viral hepatitis.    Fibrosis staging:   - Fibroscan 5/2025: F2 (kPa 7.7), S3  - ELF score = 10.98, mid risk of advanced fibrosis (score could correlate with F2-3)     Interval history:  Feels well overall.     No symptoms of hepatic decompensation including jaundice, ascites, cognitive problems to suggest hepatic encephalopathy, or GI bleeding.     Updates on risk factors for MASLD:  Weight -- Body mass index is 40.17 kg/m².   Weight is relatively stable  Ozempic dose recently increased                       Cholesterol -- improving   On atorvastatin                              Insulin resistance / diabetes --   Lab Results   Component Value Date    HGBA1C 7.3 (H) 05/16/2025          Alcohol use: 4-5 x week, 2 glasses of wine + shot of bourbon     Liver enzymes: ALT 50s    Health Maintenance:  - Most recent imaging: abd US 5/16/25 without focal hepatic lesion  - Hepatitis A & B vaccination: no          Past medical history, surgical history, problem list, family history, social history, allergies: Reviewed and updated in the appropriate section of the electronic medical record.      Current Medications[1]  Medication list reviewed and  "updated.      Review of Systems - as per HPI  Constitutional: Negative for unexpected weight change.   Respiratory: Negative for shortness of breath.    Cardiovascular: Negative for leg swelling.  Gastrointestinal: Negative for abdominal distention or abdominal pain. Negative for melena or hematemesis.  Musculoskeletal: Negative for myalgias.    Skin: Negative for jaundice or itching.  Neurological: Negative for confusion or slowed mentation. Negative for tremors.   Hematological: Does not bruise/bleed easily.       Physical Exam   Constitutional: No distress. Alert and oriented.  Eyes: No scleral icterus.   Pulmonary/Chest: Respiratory effort normal. No respiratory distress.   Abdominal: No distension, no ascites appreciated.   Extremities: No edema.   Neurological: No tremor.   Skin: No jaundice.   Psychiatric: Normal mood and affect. Speech, behavior, and thought content normal.       LABS & DIAGNOSTIC STUDIES     I have personally reviewed pertinent laboratory findings:    Lab Results   Component Value Date    ALT 50 (H) 05/16/2025    AST 27 05/16/2025    ALKPHOS 69 05/16/2025    BILITOT 1.0 05/16/2025    ALBUMIN 3.9 05/16/2025    INR 1.07 12/17/2020       Lab Results   Component Value Date    WBC 6.71 05/16/2025    HGB 15.8 05/16/2025    HCT 48.4 05/16/2025    MCV 95 05/16/2025     (L) 05/16/2025       Lab Results   Component Value Date     05/16/2025    K 4.1 05/16/2025    BUN 10 05/16/2025    CREATININE 0.9 05/16/2025    ESTGFRAFRICA >60.0 03/19/2021    EGFRNONAA >60.0 03/19/2021       Lab Results   Component Value Date    FERRITIN 232.0 05/16/2025    CERULOPLSM 22.0 05/16/2025    HEPBSAG Negative 09/23/2020    HEPBCAB Non-Reactive 05/16/2025    HEPCAB Negative 09/23/2020       No results found for: "AFP"      I have personally reviewed the following result reports:  Abdominal US - 5/16/25      ASSESSMENT & PLAN     74 y.o. male with:    1. Metabolic dysfunction-associated steatotic liver disease " and increased alcohol intake (MetALD)    2. Morbid obesity with BMI of 40.0-44.9, adult    3. Type 2 diabetes mellitus with diabetic polyneuropathy, without long-term current use of insulin    4. Essential hypertension    5. Mixed hyperlipidemia    6. Thrombocytopenia    7. Elevated liver enzymes    8. Splenomegaly        Fibroscan and ELF test suggest at least moderate fibrosis (F2), though ELF score could possibly suggest more advanced fibrosis. He has splenomegaly and thrombocytopenia though platelet count has reportedly been low for decades.     Recommendations:   Discussed option for MRI elastography. Fibroscan accuracy may be limited by BMI/body habitus and he has findings that could suggest advanced fibrosis.  He would likely benefit from Rezdiffra and he is interested. Can start this pending MRI results.   If starting Rezdiffra, will check LFTs at 3 and 6 months  Annual US surveillance though may need HCC screening [id number]   Weight loss efforts- if no success with higher dose of Ozempic, would consider changing to Mounjaro   Mediterranean diet; limit carbohydrates/sugars and processed foods, increase protein/fiber. Moderate exercise (150 min/week) as tolerated.   Maintain good control of blood pressure, cholesterol, and blood sugar  Decrease alcohol use- limit to 2 drinks/sitting and not daily or most days per week. If he has advanced fibrosis, abstinence would be encouraged.   Recommend vaccination for hepatitis A/B if not immune      Orders Placed This Encounter   Procedures    MR Elastography       Return to clinic in 6 months.       Thank you for allowing me to participate in the care of Tal Maldonado    Emma Martins, ANGIP-C  Hepatology        Duration of encounter: 30 min  This includes face to face time and non-face to face time preparing to see the patient (eg, review of tests), obtaining and/or reviewing separately obtained history, documenting clinical information in the electronic or other  health record, independently interpreting results and communicating results to the patient/family/caregiver, or Care coordination.         [1]   Current Outpatient Medications   Medication Sig Dispense Refill    allopurinoL (ZYLOPRIM) 100 MG tablet Take 2 tablets (200 mg total) by mouth once daily. 180 tablet 3    amLODIPine (NORVASC) 10 MG tablet Take 1 tablet (10 mg total) by mouth once daily. 90 tablet 3    aspirin (ECOTRIN) 81 MG EC tablet Take 1 tablet (81 mg total) by mouth once daily.  0    atorvastatin (LIPITOR) 80 MG tablet Take 1 tablet (80 mg total) by mouth every evening. 90 tablet 3    blood-glucose meter kit To check BG 2 times daily, to use with insurance preferred meter 1 each 0    calcipotriene-betamethasone 0.005-0.064 % Crea Apply 1 application topically once daily at 6am. 60 g 3    clotrimazole-betamethasone 1-0.05% (LOTRISONE) cream Apply topically 2 (two) times daily.      dapagliflozin propanediol (FARXIGA) 5 mg Tab tablet Take 1 tablet (5 mg total) by mouth once daily. 90 tablet 3    finasteride (PROSCAR) 5 mg tablet Take 1 tablet (5 mg total) by mouth once daily. 90 tablet 3    FREESTYLE LITE STRIPS Strp USE AS DIRECTED TO TEST ONCE DAILY 100 strip 3    glipiZIDE (GLUCOTROL) 10 MG TR24 Take 1 tablet (10 mg total) by mouth daily with breakfast. 90 tablet 3    glucosamine-chondroitin 500-400 mg tablet Take 1 tablet by mouth 3 (three) times daily.      indomethacin (INDOCIN) 50 MG capsule Take 1 capsule (50 mg total) by mouth 2 (two) times daily as needed (gout flare). 30 capsule 1    lancets (ACCU-CHEK FASTCLIX LANCET DRUM) Misc Inject 1 Units into the skin 2 (two) times a day. 200 each 11    LANCETS MISC by Misc.(Non-Drug; Combo Route) route.      levothyroxine (SYNTHROID) 150 MCG tablet Take 1 tablet (150 mcg total) by mouth before breakfast. 90 tablet 3    lisinopriL-hydrochlorothiazide (PRINZIDE,ZESTORETIC) 20-12.5 mg per tablet Take 1 tablet by mouth 2 (two) times a day. TAKE 1 TABLET BY  "MOUTH TWICE DAILY 180 tablet 3    metFORMIN (GLUCOPHAGE) 1000 MG tablet Take 1 tablet (1,000 mg total) by mouth 2 (two) times daily with meals. 180 tablet 3    metoprolol succinate (TOPROL-XL) 200 MG 24 hr tablet Take 1 tablet (200 mg total) by mouth once daily. 90 tablet 3    omega-3 fatty acids/fish oil (FISH OIL-OMEGA-3 FATTY ACIDS) 300-1,000 mg capsule Take by mouth once daily.      pen needle, diabetic (BD NIC 2ND GEN PEN NEEDLE) 32 gauge x 5/32" Ndle USE ONCE DAILY AS DIRECTED 100 each 3    prednisoLONE-moxiflox-bromfen 1-0.5-0.075 % DrpS Apply 1 drop to eye 3 (three) times daily. 5 mL 3    prednisoLONE-moxiflox-bromfen 1-0.5-0.075 % DrpS Apply 1 drop to eye 3 (three) times daily. 5 mL 3    semaglutide (OZEMPIC) 2 mg/dose (8 mg/3 mL) PnIj Inject 2 mg into the skin every 7 days. 9 mL 3    tamsulosin (FLOMAX) 0.4 mg Cap Take 1 capsule (0.4 mg total) by mouth once daily. 90 capsule 3    triamcinolone acetonide 0.1% (KENALOG) 0.1 % cream Apply topically 2 (two) times daily. 453.6 g 0     No current facility-administered medications for this visit.     "

## 2025-05-22 NOTE — PROCEDURES
FibroScan Transplant Hepatology(Vibration Controlled Transient Elastography)    Date/Time: 5/22/2025 1:00 PM    Performed by: Emma Martins NP  Authorized by: Emma Martins NP    Diagnosis:  NAFLD and Alcohol    Probe:  XL    Universal Protocol: Patient's identity, procedure and site were verified, confirmatory pause was performed.  Discussed procedure including risks and potential complications.  Questions answered.  Patient verbalizes understanding and wishes to proceed with VCTE.     Procedure: After providing explanations of the procedure, patient was placed in the supine position with right arm in maximum abduction to allow optimal exposure of right lateral abdomen.  Patient was briefly assessed, Testing was performed in the mid-axillary location, 50Hz Shear Wave pulses were applied and the resulting Shear Wave and Propagation Speed detected with a 3.5 MHz ultrasonic signal, using the FibroScan probe, Skin to liver capsule distance and liver parenchyma were accessed during the entire examination with the FibroScan probe, Patient was instructed to breathe normally and to abstain from sudden movements during the procedure, allowing for random measurements of liver stiffness. At least 10 Shear Waves were produced, Individual measurements of each Shear Wave were calculated.  Patient tolerated the procedure well with no complications.  Meets discharge criteria as was dismissed.  Rates pain 0 out of 10.  Patient will follow up with ordering provider to review results.    Findings  Median liver stiffness score:  7.7  CAP Reading: dB/m:  357    IQR/med %:  8  Interpretation  Fibrosis interpretation is based on medial liver stiffness - Kilopascal (kPa).    Fibrosis Stage:  F2  Steatosis interpretation is based on controlled attenuation parameter - (dB/m).    Steatosis Grade:  S3

## 2025-05-22 NOTE — PATIENT INSTRUCTIONS
"MRI elastography   Pending results, consider Rezdiffra for fatty liver treatment         Metabolic-Dysfunction Associated Steatotic Liver Disease (MASLD, "fatty liver"):      Resources for patients:   [email or web address].pocn.com/patient-resources/  [email or web address]      Lifestyle changes can help to reverse MASLD and prevent liver damage:  1. Weight loss goal of 25 lbs    *Weight loss of 5% has been shown to reduce fat in the liver  *Weight loss of 7-10% has been shown to improve liver fat, inflammation from fatty liver, and even liver fibrosis (scarring/damage)    2. Mediterranean diet is recommended though decreasing daily calories is needed for weight loss. Avoid drinking beverages with sugar. Limit processed foods. Meals/snacks should be high in protein/fiber, low in carbohydrates/sugar.     If interested in seeing a dietician to create a weight loss plan, contact the dietician team at [facility]: [email or web address].  You can also call to schedule a consult with a dietician: [phone]  *Virtual visits are available with one of our dieticians.     3. 150 min/week of moderate exercise (aerobic + resistance), as tolerated. Studies have shown that exercise improves fatty liver even without weight loss.     4. Recommend good control of blood pressure, cholesterol, and blood sugar levels as these are risk factors for MASLD. Cholesterol lowering medications including statins are typically safe and beneficial (even if liver enzymes are elevated).    5. Limit alcohol consumption, no more than 2 serving(s) of alcohol in any day (1 serving is 5 ounces of wine, 12 ounces of beer, or 1.5 ounces of liquor). Do not recommend daily alcohol use or drinking alcohol most days per week.    In some people, MASLD can progress to steatohepatitis or MASH (inflammatory fatty liver). This can cause liver scarring or damage (fibrosis) which can progress to cirrhosis. Cirrhosis increases risk of liver cancer and liver " failure. Lifestyle changes now can help to decrease this risk.       DIET TIPS:  ADD OLIVE OIL (in salads or when cooking). Avocado oil is great option too.   ADD COFFEE. Black coffee (low caloric, 2-3 cups per day) may reduce liver fat.   BE MINDFUL OF CARBOHYDRATES AND ADDED SUGARS. Try to limit your carbohydrate intake to LESS than 30-45 grams of carbs with a meal or LESS than 5-10 grams with any snack. Dressings, seasonings, condiments, etc should have less than 2 grams of sugar. Avoid fried foods and processed foods.   KEEP A FOOD DIARY. Use MyFitness Pal or LOSE IT [place] to add up your carbs and calories throughout the day.  LIMIT WHAT YOU ARE DRINKING. Avoid beverages with carbs/sugar as this can lead to high blood sugar and weight gain (soda, sweet tea, fruit juice, lemonade, sports drinks, milk).

## 2025-05-24 ENCOUNTER — RESULTS FOLLOW-UP (OUTPATIENT)
Dept: FAMILY MEDICINE | Facility: CLINIC | Age: 75
End: 2025-05-24

## 2025-05-30 DIAGNOSIS — M10.49 OTHER SECONDARY ACUTE GOUT OF MULTIPLE SITES: ICD-10-CM

## 2025-05-30 RX ORDER — ALLOPURINOL 100 MG/1
200 TABLET ORAL DAILY
Qty: 180 TABLET | Refills: 3 | Status: SHIPPED | OUTPATIENT
Start: 2025-05-30

## 2025-05-30 NOTE — TELEPHONE ENCOUNTER
Refill Decision Note   Tal Joel  is requesting a refill authorization.    Brief Assessment and Rationale for Refill:   Approve       Medication Therapy Plan:         Comments:     Note composed:3:03 PM 05/30/2025

## 2025-05-30 NOTE — TELEPHONE ENCOUNTER
No care due was identified.  [facility] Embedded Care Due Messages. Reference number: [id number].   5/30/2025 9:22:10 AM CDT

## 2025-06-06 ENCOUNTER — HOSPITAL ENCOUNTER (OUTPATIENT)
Dept: RADIOLOGY | Facility: HOSPITAL | Age: 75
Discharge: HOME | End: 2025-06-06
Attending: NURSE PRACTITIONER
Payer: MEDICARE

## 2025-06-06 DIAGNOSIS — K76.0 METABOLIC DYSFUNCTION-ASSOCIATED STEATOTIC LIVER DISEASE AND INCREASED ALCOHOL INTAKE (METALD): ICD-10-CM

## 2025-06-06 DIAGNOSIS — E11.42 TYPE 2 DIABETES MELLITUS WITH DIABETIC POLYNEUROPATHY, WITHOUT LONG-TERM CURRENT USE OF INSULIN: ICD-10-CM

## 2025-06-06 DIAGNOSIS — E66.01 MORBID OBESITY WITH BMI OF 40.0-44.9, ADULT: ICD-10-CM

## 2025-06-06 DIAGNOSIS — F10.90 METABOLIC DYSFUNCTION-ASSOCIATED STEATOTIC LIVER DISEASE AND INCREASED ALCOHOL INTAKE (METALD): ICD-10-CM

## 2025-06-06 PROCEDURE — 76391 MR ELASTOGRAPHY: CPT | Mod: TC,PO

## 2025-06-06 PROCEDURE — 76391 MR ELASTOGRAPHY: CPT | Mod: 26,,, | Performed by: STUDENT IN AN ORGANIZED HEALTH CARE EDUCATION/TRAINING PROGRAM

## 2025-06-08 ENCOUNTER — PATIENT MESSAGE (OUTPATIENT)
Facility: CLINIC | Age: 75
End: 2025-06-08
Payer: MEDICARE

## 2025-06-10 ENCOUNTER — TELEPHONE (OUTPATIENT)
Facility: CLINIC | Age: 75
End: 2025-06-10
Payer: MEDICARE

## 2025-06-10 DIAGNOSIS — K86.9 PANCREATIC LESION: Primary | ICD-10-CM

## 2025-06-10 DIAGNOSIS — K86.89 DILATED PANCREATIC DUCT: ICD-10-CM

## 2025-06-10 NOTE — TELEPHONE ENCOUNTER
Contacted patient. Scheduled f/u appointment on 6/20 and Mri on 7/10. Mailed appointment reminder to patient.

## 2025-06-20 ENCOUNTER — OFFICE VISIT (OUTPATIENT)
Facility: CLINIC | Age: 75
End: 2025-06-20
Payer: MEDICARE

## 2025-06-20 DIAGNOSIS — E66.812 CLASS 2 SEVERE OBESITY WITH SERIOUS COMORBIDITY AND BODY MASS INDEX (BMI) OF 38.0 TO 38.9 IN ADULT, UNSPECIFIED OBESITY TYPE: ICD-10-CM

## 2025-06-20 DIAGNOSIS — K76.0 METABOLIC DYSFUNCTION-ASSOCIATED STEATOTIC LIVER DISEASE (MASLD): Primary | ICD-10-CM

## 2025-06-20 DIAGNOSIS — E11.42 TYPE 2 DIABETES MELLITUS WITH DIABETIC POLYNEUROPATHY, WITHOUT LONG-TERM CURRENT USE OF INSULIN: ICD-10-CM

## 2025-06-20 DIAGNOSIS — I10 ESSENTIAL HYPERTENSION: ICD-10-CM

## 2025-06-20 DIAGNOSIS — R74.8 ELEVATED LIVER ENZYMES: ICD-10-CM

## 2025-06-20 DIAGNOSIS — E78.2 MIXED HYPERLIPIDEMIA: ICD-10-CM

## 2025-06-20 DIAGNOSIS — E66.01 CLASS 2 SEVERE OBESITY WITH SERIOUS COMORBIDITY AND BODY MASS INDEX (BMI) OF 38.0 TO 38.9 IN ADULT, UNSPECIFIED OBESITY TYPE: ICD-10-CM

## 2025-06-20 DIAGNOSIS — D69.6 THROMBOCYTOPENIA: ICD-10-CM

## 2025-06-20 DIAGNOSIS — K74.00 HEPATIC FIBROSIS: ICD-10-CM

## 2025-06-20 PROCEDURE — 99999 PR PBB SHADOW E&M-EST. PATIENT-LVL I: CPT | Mod: PBBFAC,,, | Performed by: NURSE PRACTITIONER

## 2025-06-20 PROCEDURE — 99211 OFF/OP EST MAY X REQ PHY/QHP: CPT | Mod: PBBFAC,PN | Performed by: NURSE PRACTITIONER

## 2025-06-20 RX ORDER — RESMETIROM 100 MG/1
100 TABLET, COATED ORAL DAILY
Qty: 30 TABLET | Refills: 11 | Status: SHIPPED | OUTPATIENT
Start: 2025-06-20

## 2025-06-20 NOTE — PATIENT INSTRUCTIONS
Will try to start Rezdiffra   If we can start Rezdiffra, will need to drop atorvastatin dose to 40 mg daily   MRI with contrast for pancreas   Keep up the great work with weight loss!         Rezdiffra prescription has been sent to the [facility]. At this time, the [facility] cannot dispense the medication though they can assist with insurance coverage and arrange for another specialty pharmacy to ship the medication to you. If you need to check on the status of your prescription, please contact the [facility] at [phone] or [phone].      Rezdiffra (resmetirom):   There is now an FDA approved treatment for patients with fatty liver with stage 2 or 3 liver fibrosis (scar tissue, damage). Rezdiffra should be used in conjunction with diet and exercise.     The dose is weight based:  <100 kg weight = 80 mg daily   >100 kg weight = 100 mg daily   The medication can be taken with or without food.    Medications that you cannot take with Rezdiffra:   Gemfibrozil (ok to take fenofibrate)  Cyclosporine    Medications that require dose adjustments:  -- Plavix (can only use 60-80 mg of Rezdiffra)  -- Statins    Max dose of pravastatin / atorvastatin = 40 mg daily    Max dose of simvastatin / rosuvastatin = 20 mg daily     Warnings:  -- Can worsen gallstones and gallbladder inflammation though this occurred in <1% of patients in the study    Side effects:  -- Nausea and diarrhea were most common. This occurred 1-2 weeks after starting Rezdiffra and typically lasted for an average of 3 weeks.  -- Itching    Recommended lab monitoring after starting:   -- Labs at 3 and 6 months, then every 6 months thereafter    Restrictions in patients with decreased kidney function:  Avoid if GFR <30

## 2025-06-20 NOTE — Clinical Note
Just a heads up, we are trying to start Rezdiffra for him. If we can get it approved, his atorvastatin would need to be lowered to 40 mg daily. The Rezdiffra helps lower cholesterol but also makes the statins more effective at lower doses. I'll keep you posted if we are able to get him on the medication. Thanks!

## 2025-06-20 NOTE — PROGRESS NOTES
[facility] - Established Patient    Last Clinic Visit: 5/22/25    Chief Complaint: Follow-up for Met-ALD      HISTORY     This is a 74 y.o. male with PMH noted below, here for follow-up of Met-ALD.      He was told that he had fatty liver 30 years ago. Had liver biopsy though does not recall specific fibrosis staging.      Imaging has shown hepatic steatosis and hepatosplenomegaly.    Risk factors for MASLD include:   BMI >40  HTN, HLD, DM, WINSOME, hypothyroidism   *Noted h/o CAD    Alcohol use: 10 oz wine 4-5 x week + shot of bourbon 4-5 x week     His ALT has been intermittently elevated, 40-50s. Liver function is normal. Platelets chronically low x 30 years.     Serologic workup has been negative for Camacho's, alpha-1 antitrypsin deficiency, hemochromatosis, and viral hepatitis.    Fibrosis staging:   - Fibroscan 5/2025: F2 (kPa 7.7), S3  - ELF score = 10.98, high risk of advanced fibrosis (score could correlate with F2-3)   - MRI elastography 6/2025: F1-2 (though kPa score more consistent with F3)     Interval history:  Feels well overall.   No symptoms of hepatic decompensation including jaundice, ascites, cognitive problems to suggest hepatic encephalopathy, or GI bleeding.     Updates on risk factors for MASLD:  Weight -- BMI 38  Has lost 7 lb with ozempic dose increase                    Cholesterol -- improving   On atorvastatin                              Insulin resistance / diabetes -- notes BG much improved at home, readings now ~110   Lab Results   Component Value Date    HGBA1C 7.3 (H) 05/16/2025          Alcohol use: none x 3 weeks, ozempic has decreased interest in this.     Liver enzymes: ALT 50s    MRI elasto with incidental finding of multiple pancreatic cystic lesions and a small renal lesion, likely cyst.         Past medical history, surgical history, problem list, family history, social history, allergies: Reviewed and updated in the appropriate section of the electronic medical  "record.      Current Medications[1]  Medication list reviewed and updated.      Review of Systems - as per HPI  Constitutional: Negative for unexpected weight change.   Respiratory: Negative for shortness of breath.    Cardiovascular: Negative for leg swelling.  Gastrointestinal: Negative for abdominal distention or abdominal pain. Negative for melena or hematemesis.  Musculoskeletal: Negative for myalgias.    Skin: Negative for jaundice or itching.  Neurological: Negative for confusion or slowed mentation. Negative for tremors.   Hematological: Does not bruise/bleed easily.       Physical Exam   Constitutional: No distress. Alert and oriented.  Eyes: No scleral icterus.   Pulmonary/Chest: Respiratory effort normal. No respiratory distress.   Abdominal: No distension, no ascites appreciated.   Extremities: No edema.   Neurological: No tremor.   Skin: No jaundice.   Psychiatric: Normal mood and affect. Speech, behavior, and thought content normal.       LABS & DIAGNOSTIC STUDIES     I have personally reviewed pertinent laboratory findings:    Lab Results   Component Value Date    ALT 50 (H) 05/16/2025    AST 27 05/16/2025    ALKPHOS 69 05/16/2025    BILITOT 1.0 05/16/2025    ALBUMIN 3.9 05/16/2025    INR 1.07 12/17/2020       Lab Results   Component Value Date    WBC 6.71 05/16/2025    HGB 15.8 05/16/2025    HCT 48.4 05/16/2025    MCV 95 05/16/2025     (L) 05/16/2025       Lab Results   Component Value Date     05/16/2025    K 4.1 05/16/2025    BUN 10 05/16/2025    CREATININE 0.9 05/16/2025    ESTGFRAFRICA >60.0 03/19/2021    EGFRNONAA >60.0 03/19/2021       Lab Results   Component Value Date    FERRITIN 232.0 05/16/2025    CERULOPLSM 22.0 05/16/2025    HEPBSAG Negative 09/23/2020    HEPBCAB Non-Reactive 05/16/2025    HEPCAB Negative 09/23/2020       No results found for: "AFP"      I have personally reviewed the following result reports:  Abdominal US - 5/16/25  MRI - 6/6/25      ASSESSMENT & PLAN "     74 y.o. male with:    1. Metabolic dysfunction-associated steatotic liver disease (MASLD)    2. Elevated liver enzymes    3. Hepatic fibrosis    4. Essential hypertension    5. Mixed hyperlipidemia    6. Thrombocytopenia    7. Type 2 diabetes mellitus with diabetic polyneuropathy, without long-term current use of insulin    8. Class 2 severe obesity with serious comorbidity and body mass index (BMI) of 38.0 to 38.9 in adult, unspecified obesity type        Fibroscan, MRI elasto, and ELF test all suggest at least moderate fibrosis, F2, though some scores suggest F3.   He has splenomegaly and thrombocytopenia though platelet count has reportedly been low for decades.     Recommendations:   Start Rezdiffra 100 mg daily. Rx sent to [facility].  If Rezdiffra is started, will need to decrease atorvastatin to 40 mg daily.   Monitor LFTs at 3 and 6 months  MRI MRCP now to evaluate pancreatic cysts. Can refer to GI for continued surveillance, may need EUS.   Repeat Fibroscan 1 year  Weight loss efforts- commended on recent success. Doing well with higher dose of ozempic.   Mediterranean diet; limit carbohydrates/sugars and processed foods, increase protein/fiber. Moderate exercise (150 min/week) as tolerated.   Maintain good control of blood pressure, cholesterol, and blood sugar  Continue to minimize alcohol use   Recommend vaccination for hepatitis A/B if not immune      Return to clinic in 6 months.       Thank you for allowing me to participate in the care of Tal Joel Martins, FNP-C  Hepatology        Duration of encounter: 30 min  This includes face to face time and non-face to face time preparing to see the patient (eg, review of tests), obtaining and/or reviewing separately obtained history, documenting clinical information in the electronic or other health record, independently interpreting results and communicating results to the patient/family/caregiver, or Care coordination.         [1]   Current  Outpatient Medications   Medication Sig Dispense Refill    allopurinoL (ZYLOPRIM) 100 MG tablet Take 2 tablets (200 mg total) by mouth once daily. 180 tablet 3    amLODIPine (NORVASC) 10 MG tablet Take 1 tablet (10 mg total) by mouth once daily. 90 tablet 3    aspirin (ECOTRIN) 81 MG EC tablet Take 1 tablet (81 mg total) by mouth once daily.  0    atorvastatin (LIPITOR) 80 MG tablet Take 1 tablet (80 mg total) by mouth every evening. 90 tablet 3    blood-glucose meter kit To check BG 2 times daily, to use with insurance preferred meter 1 each 0    calcipotriene-betamethasone 0.005-0.064 % Crea Apply 1 application topically once daily at 6am. 60 g 3    clotrimazole-betamethasone 1-0.05% (LOTRISONE) cream Apply topically 2 (two) times daily.      dapagliflozin propanediol (FARXIGA) 5 mg Tab tablet Take 1 tablet (5 mg total) by mouth once daily. 90 tablet 3    finasteride (PROSCAR) 5 mg tablet Take 1 tablet (5 mg total) by mouth once daily. 90 tablet 3    FREESTYLE LITE STRIPS Strp USE AS DIRECTED TO TEST ONCE DAILY 100 strip 3    glipiZIDE (GLUCOTROL) 10 MG TR24 Take 1 tablet (10 mg total) by mouth daily with breakfast. 90 tablet 3    glucosamine-chondroitin 500-400 mg tablet Take 1 tablet by mouth 3 (three) times daily.      indomethacin (INDOCIN) 50 MG capsule Take 1 capsule (50 mg total) by mouth 2 (two) times daily as needed (gout flare). 30 capsule 1    lancets (ACCU-CHEK FASTCLIX LANCET DRUM) Mis Inject 1 Units into the skin 2 (two) times a day. 200 each 11    LANCETS MISC by Misc.(Non-Drug; Combo Route) route.      levothyroxine (SYNTHROID) 150 MCG tablet Take 1 tablet (150 mcg total) by mouth before breakfast. 90 tablet 3    lisinopriL-hydrochlorothiazide (PRINZIDE,ZESTORETIC) 20-12.5 mg per tablet Take 1 tablet by mouth 2 (two) times a day. TAKE 1 TABLET BY MOUTH TWICE DAILY 180 tablet 3    metFORMIN (GLUCOPHAGE) 1000 MG tablet Take 1 tablet (1,000 mg total) by mouth 2 (two) times daily with meals. 180  "tablet 3    metoprolol succinate (TOPROL-XL) 200 MG 24 hr tablet Take 1 tablet (200 mg total) by mouth once daily. 90 tablet 3    omega-3 fatty acids/fish oil (FISH OIL-OMEGA-3 FATTY ACIDS) 300-1,000 mg capsule Take by mouth once daily.      pen needle, diabetic (BD NIC 2ND GEN PEN NEEDLE) 32 gauge x 5/32" Ndle USE ONCE DAILY AS DIRECTED 100 each 3    prednisoLONE-moxiflox-bromfen 1-0.5-0.075 % DrpS Apply 1 drop to eye 3 (three) times daily. 5 mL 3    prednisoLONE-moxiflox-bromfen 1-0.5-0.075 % DrpS Apply 1 drop to eye 3 (three) times daily. 5 mL 3    resmetirom (REZDIFFRA) 100 mg Tab Take 100 mg by mouth once daily. 30 tablet 11    semaglutide (OZEMPIC) 2 mg/dose (8 mg/3 mL) PnIj Inject 2 mg into the skin every 7 days. 9 mL 3    tamsulosin (FLOMAX) 0.4 mg Cap Take 1 capsule (0.4 mg total) by mouth once daily. 90 capsule 3    triamcinolone acetonide 0.1% (KENALOG) 0.1 % cream Apply topically 2 (two) times daily. 453.6 g 0     No current facility-administered medications for this visit.     "

## 2025-06-26 DIAGNOSIS — E11.42 TYPE 2 DIABETES MELLITUS WITH DIABETIC POLYNEUROPATHY, WITHOUT LONG-TERM CURRENT USE OF INSULIN: ICD-10-CM

## 2025-06-26 RX ORDER — GLIPIZIDE 10 MG/1
10 TABLET, FILM COATED, EXTENDED RELEASE ORAL
Qty: 90 TABLET | Refills: 3 | Status: SHIPPED | OUTPATIENT
Start: 2025-06-26

## 2025-06-26 NOTE — TELEPHONE ENCOUNTER
No care due was identified.  [facility] Embedded Care Due Messages. Reference number: [id number].   6/26/2025 8:58:58 AM CDT

## 2025-07-02 ENCOUNTER — TELEPHONE (OUTPATIENT)
Facility: CLINIC | Age: 75
End: 2025-07-02
Payer: MEDICARE

## 2025-07-04 ENCOUNTER — PATIENT MESSAGE (OUTPATIENT)
Dept: FAMILY MEDICINE | Facility: CLINIC | Age: 75
End: 2025-07-04
Payer: MEDICARE

## 2025-07-04 DIAGNOSIS — E78.2 MIXED HYPERLIPIDEMIA: ICD-10-CM

## 2025-07-04 DIAGNOSIS — E11.42 TYPE 2 DIABETES MELLITUS WITH DIABETIC POLYNEUROPATHY, WITHOUT LONG-TERM CURRENT USE OF INSULIN: ICD-10-CM

## 2025-07-04 RX ORDER — ATORVASTATIN CALCIUM 40 MG/1
40 TABLET, FILM COATED ORAL NIGHTLY
Qty: 90 TABLET | Refills: 3 | Status: SHIPPED | OUTPATIENT
Start: 2025-07-04

## 2025-07-05 DIAGNOSIS — E11.42 TYPE 2 DIABETES MELLITUS WITH DIABETIC POLYNEUROPATHY, WITHOUT LONG-TERM CURRENT USE OF INSULIN: ICD-10-CM

## 2025-07-05 NOTE — TELEPHONE ENCOUNTER
No care due was identified.  [facility] Embedded Care Due Messages. Reference number: [id number].   7/05/2025 8:30:15 AM CDT

## 2025-07-07 RX ORDER — DAPAGLIFLOZIN 5 MG/1
5 TABLET, FILM COATED ORAL
Qty: 90 TABLET | Refills: 1 | Status: SHIPPED | OUTPATIENT
Start: 2025-07-07

## 2025-07-07 NOTE — TELEPHONE ENCOUNTER
Refill Decision Note   Tal Joel  is requesting a refill authorization.  Brief Assessment and Rationale for Refill:  Approve     Medication Therapy Plan:         Comments:     Note composed:9:09 AM 07/07/2025

## 2025-07-08 DIAGNOSIS — I10 ESSENTIAL HYPERTENSION: Primary | ICD-10-CM

## 2025-07-10 ENCOUNTER — HOSPITAL ENCOUNTER (OUTPATIENT)
Dept: RADIOLOGY | Facility: HOSPITAL | Age: 75
Discharge: HOME | End: 2025-07-10
Attending: NURSE PRACTITIONER
Payer: MEDICARE

## 2025-07-10 DIAGNOSIS — K86.9 PANCREATIC LESION: ICD-10-CM

## 2025-07-10 DIAGNOSIS — K86.89 DILATED PANCREATIC DUCT: ICD-10-CM

## 2025-07-10 PROCEDURE — 74183 MRI ABD W/O CNTR FLWD CNTR: CPT | Mod: TC,PO

## 2025-07-10 PROCEDURE — 25500020 PHARM REV CODE 255: Mod: PO | Performed by: NURSE PRACTITIONER

## 2025-07-10 PROCEDURE — A9585 GADOBUTROL INJECTION: HCPCS | Mod: PO | Performed by: NURSE PRACTITIONER

## 2025-07-10 RX ORDER — GADOBUTROL 604.72 MG/ML
10 INJECTION INTRAVENOUS
Status: COMPLETED | OUTPATIENT
Start: 2025-07-10 | End: 2025-07-10

## 2025-07-10 RX ADMIN — GADOBUTROL 10 ML: 604.72 INJECTION INTRAVENOUS at 08:07

## 2025-07-11 ENCOUNTER — PATIENT MESSAGE (OUTPATIENT)
Facility: CLINIC | Age: 75
End: 2025-07-11
Payer: MEDICARE

## 2025-07-23 ENCOUNTER — OFFICE VISIT (OUTPATIENT)
Dept: GASTROENTEROLOGY | Facility: CLINIC | Age: 75
End: 2025-07-23
Payer: MEDICARE

## 2025-07-23 VITALS
SYSTOLIC BLOOD PRESSURE: 135 MMHG | DIASTOLIC BLOOD PRESSURE: 69 MMHG | BODY MASS INDEX: 39.31 KG/M2 | WEIGHT: 265.44 LBS | HEIGHT: 69 IN | HEART RATE: 88 BPM

## 2025-07-23 DIAGNOSIS — K86.89 PANCREATIC DUCT DILATED: ICD-10-CM

## 2025-07-23 DIAGNOSIS — K80.50 CHOLEDOCHOLITHIASIS: ICD-10-CM

## 2025-07-23 DIAGNOSIS — K83.8 COMMON BILE DUCT DILATATION: ICD-10-CM

## 2025-07-23 DIAGNOSIS — K86.2 PANCREAS CYST: Primary | ICD-10-CM

## 2025-07-23 DIAGNOSIS — E11.42 TYPE 2 DIABETES MELLITUS WITH DIABETIC POLYNEUROPATHY, WITHOUT LONG-TERM CURRENT USE OF INSULIN: ICD-10-CM

## 2025-07-23 PROCEDURE — 99999 PR PBB SHADOW E&M-EST. PATIENT-LVL IV: CPT | Mod: PBBFAC,,,

## 2025-07-23 PROCEDURE — 99214 OFFICE O/P EST MOD 30 MIN: CPT | Mod: PBBFAC

## 2025-07-23 NOTE — PROGRESS NOTES
[facility]    Reason for Visit:  The primary encounter diagnosis was Pancreas cyst. Diagnoses of Choledocholithiasis, Type 2 diabetes mellitus with diabetic polyneuropathy, without long-term current use of insulin, Pancreatic duct dilated, and Common bile duct dilatation were also pertinent to this visit.    PCP:   Marlon Iglesias   [address] [facility] [id number]      Initial HPI   This is a 75 y.o. male with history of HTN, HLD, DMT2, obesity, MASLD, hepatic fibrosis presenting for pancreas cyst, biliary ductal dilatation, and questionable choledocholithiasis. Patient initially saw hepatology and has an MR elastography which demonstrated multiple scattered T2 hyperintense cystic appearing foci throughout the pancreas measuring up to 1.4 cm and mild dilatation of the pancreatic duct measuring 5-6 mm. He underwent subsequent MRCP which showed a few small cystic lesions of the pancreas measuring 8 mm and 5 mm in the body and an additional 4 mm lesion in the body. The main PD is somewhat dilated measuring up to 3.4 mm. The common hepatic duct and common bile duct is mildly dilated measuring 7 mm distally with a possible common duct stone measuring 5 mm     He denies any abdominal pain, jaundice, unintentional weight loss, fever, NV, or other symptoms. LFTs 5/1 with Tbili 1.2 and ALT 57, otherwise normal. Most recent LFTs 5/16 with normal Tbili, Normal ALP, normal AST and very mildly elevated ALT 50. Denies hx of pancreatitis. Denies family hx of pancreatic or biliary malignancy. He is diabetic- A1C 7.3. He does not smoke or drink alcohol frequently.           ROS:  ROS     Medical History:  has a past medical history of Abnormal results of liver function studies  (09/23/2020), Amblyopia, Anatomical narrow angle of both eyes, Arthritis of hand, BPH (benign prostatic hyperplasia), Cataract, Colon polyps, COPD (chronic obstructive pulmonary disease), Coronary artery disease, Diabetes  mellitus, Elevated liver enzymes (09/23/2020), Hypertension, WINSOME on CPAP, Other hyperlipidemia, Sensorineural hearing loss (SNHL) of left ear, Superficial thrombophlebitis, and Superficial thrombophlebitis.    Surgical History:  has a past surgical history that includes Appendectomy; Hernia repair; Tonsillectomy; Dupuytren contracture release (Bilateral); blephroplasty (Bilateral, 2009); Colonoscopy; Colonoscopy (N/A, 3/12/2021); Cataract extraction w/  intraocular lens implant (Left, 7/17/2024); and Cataract extraction w/  intraocular lens implant (Right, 8/7/2024).    Family History: family history includes Cataracts in his mother; Diabetes in his mother; Emphysema in his father; Glaucoma in his mother; Hypertension in his mother; Prostate cancer in his brother..       Review of patient's allergies indicates:  No Known Allergies    Medications Ordered Prior to Encounter[1]      Objective Findings: (Limited due to virtual nature of encounter)    Physical Exam:  Physical Exam        Labs:  Lab Results   Component Value Date    WBC 6.71 05/16/2025    HGB 15.8 05/16/2025    HCT 48.4 05/16/2025     (L) 05/16/2025    CHOL 134 05/16/2025    TRIG 144 05/16/2025    HDL 34 (L) 05/16/2025    ALKPHOS 69 05/16/2025    ALT 50 (H) 05/16/2025    AST 27 05/16/2025     05/16/2025    K 4.1 05/16/2025     05/16/2025    CREATININE 0.9 07/10/2025    BUN 10 05/16/2025    CO2 26 05/16/2025    TSH 2.268 05/16/2025    PSA 0.74 01/31/2025    INR 1.07 12/17/2020    HGBA1C 7.3 (H) 05/16/2025       Imaging reviewed:    MR Elastography 6/6/25  FINDINGS:  Significant motion artifact limits assessment of the abdomen on provided coronal T2 weighted sequence.  Liver is upper limit of normal size.  No significant abnormality of the visualized gallbladder.  Bile ducts are obscured.  Adrenal glands unremarkable.  Spleen is enlarged measuring 14 cm.  Multiple scattered T2 hyperintense cystic appearing foci throughout the pancreas  measuring up to 1.4 cm.  Mild dilatation of the pancreatic duct measuring 5-6 mm.  T2 hyperintense lesion in the right kidney likely representing a cyst.  No hydronephrosis.  Visualized bowel are grossly unremarkable.    MRI MRCP 7/10/25  FINDINGS:  The right lobe of the liver is enlarged measuring 18.2 cm in craniocaudal dimensions.  The spleen is upper limits of normal sized measuring 12.1 cm in craniocaudal dimensions.  The gallbladder is moderately distended.  There is a 4 mm filling defect in the gallbladder neck suggesting cholelithiasis.  Is there is a 31 mm simple cyst in the right mid kidney is is main pancreatic duct is dilated the measures as much as 3.4 mm.  The common hepatic duct and common bile duct is mildly dilated measuring 7 mm distally.  There is a possible common ducts stone measuring 5 mm on series 3, image number 30.  There is diffuse fatty liver infiltration.  Is is the images are degraded by motion but there is no gross evidence of focal hepatic mass lesion or hypervascular focus.  There is no evidence of hydronephrosis.  The is     In the pancreatic body there is an 8 x 5 mm cystic mass with an adjacent 5 mm similar cystic focus.  More peripherally in the pancreatic body there is a 4 mm lesion there is questionable ductal communication.  Pseudocyst formation as well as cystic pancreatic neoplasm would need to be considered.     Impression:     Hepatomegaly and fatty liver infiltration.     Cholelithiasis and probable choledocholithiasis with mild extrahepatic biliary ductal dilatation.     3 small pancreatic cystic masses are seen for which follow-up is advised.  Cystic pancreatic neoplasm cannot be excluded.         Endoscopy reviewed:  N/a    Assessment:  1. Pancreas cyst    2. Choledocholithiasis    3. Type 2 diabetes mellitus with diabetic polyneuropathy, without long-term current use of insulin    4. Pancreatic duct dilated    5. Common bile duct dilatation         75 y.o. male with  history of HTN, HLD, DMT2, obesity, MASLD, hepatic fibrosis presenting for pancreas cyst, biliary ductal dilatation, and questionable choledocholithiasis. Patient initially saw hepatology and has an MR elastography which demonstrated multiple scattered T2 hyperintense cystic appearing foci throughout the pancreas measuring up to 1.4 cm and mild dilatation of the pancreatic duct measuring 5-6 mm. He underwent subsequent MRCP which showed a few small cystic lesions of the pancreas measuring 8 mm and 5 mm in the body and an additional 4 mm lesion in the body. The main PD is somewhat dilated measuring up to 3.4 mm. The common hepatic duct and common bile duct is mildly dilated measuring 7 mm distally with a possible common duct stone measuring 5 mm     He denies any abdominal pain, jaundice, unintentional weight loss, fever, NV, or other symptoms. LFTs 5/1 with Tbili 1.2 and ALT 57, otherwise normal. Most recent LFTs 5/16 with normal Tbili, Normal ALP, normal AST and very mildly elevated ALT 50. Denies hx of pancreatitis. Denies family hx of pancreatic or biliary malignancy. He is diabetic- A1C 7.3. He does not smoke or drink alcohol frequently.     Recommendations:  Recommend EUS for evaluation of pancreatic cysts, PD dilation, and assess for choledocholithiasis/bile duct abnormality. +/- ERCP if choledocholithiasis or significant bile duct abnormality identified on EUS  Repeat LFTs to assess for biliary obstruction  Explained the EUS/ERCP procedure in detail and answered the patients questions regarding this procedure  Went over the risks of the procedure including but not limited to risks of anesthesia, bleeding, perforation, infection, pancreatitis- he/she verbalized understanding of these risks and would like to proceed with EUS/ +/- ERCP            Thank you so much for allowing me to participate in the care of Tal Maldonado      Stewart Samayoa PA-C  Advanced Endoscopy Physician Assistant  [facility]  [facility]                 [1]   Current Outpatient Medications on File Prior to Visit   Medication Sig Dispense Refill    allopurinoL (ZYLOPRIM) 100 MG tablet Take 2 tablets (200 mg total) by mouth once daily. 180 tablet 3    amLODIPine (NORVASC) 10 MG tablet Take 1 tablet (10 mg total) by mouth once daily. 90 tablet 3    aspirin (ECOTRIN) 81 MG EC tablet Take 1 tablet (81 mg total) by mouth once daily.  0    atorvastatin (LIPITOR) 40 MG tablet Take 1 tablet (40 mg total) by mouth every evening. 90 tablet 3    calcipotriene-betamethasone 0.005-0.064 % Crea Apply 1 application topically once daily at 6am. 60 g 3    clotrimazole-betamethasone 1-0.05% (LOTRISONE) cream Apply topically 2 (two) times daily.      FARXIGA 5 mg Tab tablet TAKE 1 TABLET(5 MG) BY MOUTH DAILY 90 tablet 1    finasteride (PROSCAR) 5 mg tablet Take 1 tablet (5 mg total) by mouth once daily. 90 tablet 3    FREESTYLE LITE STRIPS Strp USE AS DIRECTED TO TEST ONCE DAILY 100 strip 3    glipiZIDE (GLUCOTROL) 10 MG TR24 Take 1 tablet (10 mg total) by mouth daily with breakfast. 90 tablet 3    glucosamine-chondroitin 500-400 mg tablet Take 1 tablet by mouth 3 (three) times daily.      indomethacin (INDOCIN) 50 MG capsule Take 1 capsule (50 mg total) by mouth 2 (two) times daily as needed (gout flare). 30 capsule 1    lancets (ACCU-CHEK FASTCLIX LANCET DRUM) Mis Inject 1 Units into the skin 2 (two) times a day. 200 each 11    LANCETS MISC by Misc.(Non-Drug; Combo Route) route.      levothyroxine (SYNTHROID) 150 MCG tablet Take 1 tablet (150 mcg total) by mouth before breakfast. 90 tablet 3    lisinopriL-hydrochlorothiazide (PRINZIDE,ZESTORETIC) 20-12.5 mg per tablet Take 1 tablet by mouth 2 (two) times a day. TAKE 1 TABLET BY MOUTH TWICE DAILY 180 tablet 3    metFORMIN (GLUCOPHAGE) 1000 MG tablet Take 1 tablet (1,000 mg total) by mouth 2 (two) times daily with meals. 180 tablet 3    metoprolol succinate (TOPROL-XL) 200 MG 24 hr tablet Take 1 tablet  "(200 mg total) by mouth once daily. 90 tablet 3    omega-3 fatty acids/fish oil (FISH OIL-OMEGA-3 FATTY ACIDS) 300-1,000 mg capsule Take by mouth once daily.      pen needle, diabetic (BD NIC 2ND GEN PEN NEEDLE) 32 gauge x 5/32" Ndle USE ONCE DAILY AS DIRECTED 100 each 3    prednisoLONE-moxiflox-bromfen 1-0.5-0.075 % DrpS Apply 1 drop to eye 3 (three) times daily. 5 mL 3    prednisoLONE-moxiflox-bromfen 1-0.5-0.075 % DrpS Apply 1 drop to eye 3 (three) times daily. 5 mL 3    resmetirom (REZDIFFRA) 100 mg Tab Take 100 mg by mouth once daily. 30 tablet 11    semaglutide (OZEMPIC) 2 mg/dose (8 mg/3 mL) PnIj Inject 2 mg into the skin every 7 days. 9 mL 3    tamsulosin (FLOMAX) 0.4 mg Cap Take 1 capsule (0.4 mg total) by mouth once daily. 90 capsule 3    triamcinolone acetonide 0.1% (KENALOG) 0.1 % cream Apply topically 2 (two) times daily. 453.6 g 0    blood-glucose meter kit To check BG 2 times daily, to use with insurance preferred meter 1 each 0     No current facility-administered medications on file prior to visit.     "

## 2025-07-24 ENCOUNTER — LAB VISIT (OUTPATIENT)
Dept: LAB | Facility: HOSPITAL | Age: 75
End: 2025-07-24
Payer: MEDICARE

## 2025-07-24 DIAGNOSIS — K80.50 CHOLEDOCHOLITHIASIS: ICD-10-CM

## 2025-07-24 DIAGNOSIS — E11.42 TYPE 2 DIABETES MELLITUS WITH DIABETIC POLYNEUROPATHY, WITHOUT LONG-TERM CURRENT USE OF INSULIN: ICD-10-CM

## 2025-07-24 LAB
ALBUMIN SERPL BCP-MCNC: 4 G/DL (ref 3.5–5.2)
ALP SERPL-CCNC: 67 UNIT/L (ref 40–150)
ALT SERPL W/O P-5'-P-CCNC: 94 UNIT/L (ref 0–55)
AST SERPL-CCNC: 54 UNIT/L (ref 0–50)
BILIRUB DIRECT SERPL-MCNC: 0.4 MG/DL (ref 0.1–0.3)
BILIRUB SERPL-MCNC: 0.9 MG/DL (ref 0.1–1)
EAG (OHS): 146 MG/DL (ref 68–131)
HBA1C MFR BLD: 6.7 % (ref 4–5.6)
PROT SERPL-MCNC: 6.5 GM/DL (ref 6–8.4)

## 2025-07-24 PROCEDURE — 82040 ASSAY OF SERUM ALBUMIN: CPT

## 2025-07-24 PROCEDURE — 83036 HEMOGLOBIN GLYCOSYLATED A1C: CPT

## 2025-07-24 PROCEDURE — 36415 COLL VENOUS BLD VENIPUNCTURE: CPT | Mod: PO

## 2025-07-25 ENCOUNTER — PATIENT MESSAGE (OUTPATIENT)
Dept: GASTROENTEROLOGY | Facility: CLINIC | Age: 75
End: 2025-07-25
Payer: MEDICARE

## 2025-07-25 ENCOUNTER — TELEPHONE (OUTPATIENT)
Dept: GASTROENTEROLOGY | Facility: CLINIC | Age: 75
End: 2025-07-25
Payer: MEDICARE

## 2025-07-25 DIAGNOSIS — K80.50 CHOLEDOCHOLITHIASIS: Primary | ICD-10-CM

## 2025-07-25 NOTE — TELEPHONE ENCOUNTER
Patient is scheduled for a EUS/ERCP  on 8/4/2025 with Dr. LUISITO Burks  Referral for procedure from Clinic visit

## 2025-07-25 NOTE — TELEPHONE ENCOUNTER
Hey can someone please call him to schedule EUS +/- ERCP at [place] for pancreas cyst and possible choledocholithiasis. Thanks

## 2025-07-28 ENCOUNTER — OFFICE VISIT (OUTPATIENT)
Dept: PODIATRY | Facility: CLINIC | Age: 75
End: 2025-07-28
Payer: MEDICARE

## 2025-07-28 VITALS — WEIGHT: 263.69 LBS | BODY MASS INDEX: 39.06 KG/M2 | HEIGHT: 69 IN

## 2025-07-28 DIAGNOSIS — E66.01 SEVERE OBESITY (BMI 35.0-39.9) WITH COMORBIDITY: ICD-10-CM

## 2025-07-28 DIAGNOSIS — M20.42 HAMMER TOES OF BOTH FEET: ICD-10-CM

## 2025-07-28 DIAGNOSIS — M20.41 HAMMER TOES OF BOTH FEET: ICD-10-CM

## 2025-07-28 DIAGNOSIS — B35.1 ONYCHOMYCOSIS DUE TO DERMATOPHYTE: ICD-10-CM

## 2025-07-28 DIAGNOSIS — E11.42 DIABETIC POLYNEUROPATHY ASSOCIATED WITH TYPE 2 DIABETES MELLITUS: Primary | ICD-10-CM

## 2025-07-28 DIAGNOSIS — Q82.8 POROKERATOSIS: ICD-10-CM

## 2025-07-28 DIAGNOSIS — L40.9 PSORIASIS: ICD-10-CM

## 2025-07-28 PROCEDURE — 99999 PR PBB SHADOW E&M-EST. PATIENT-LVL IV: CPT | Mod: PBBFAC,,, | Performed by: PODIATRIST

## 2025-07-28 PROCEDURE — 99214 OFFICE O/P EST MOD 30 MIN: CPT | Mod: PBBFAC,PO | Performed by: PODIATRIST

## 2025-07-28 PROCEDURE — 11721 DEBRIDE NAIL 6 OR MORE: CPT | Mod: PBBFAC,PO | Performed by: PODIATRIST

## 2025-07-28 PROCEDURE — 11056 PARNG/CUTG B9 HYPRKR LES 2-4: CPT | Mod: PBBFAC,PO | Performed by: PODIATRIST

## 2025-07-28 NOTE — TELEPHONE ENCOUNTER
No care due was identified.  [facility] Embedded Care Due Messages. Reference number: [id number].   7/28/2025 8:09:55 AM CDT

## 2025-07-28 NOTE — PROGRESS NOTES
Subjective:      Patient ID: Tal Maldonado is a 75 y.o. male.    Chief Complaint: Diabetes Mellitus and Nail Care (3 month nail care DM)      Tal is a 75 y.o. male who presents to the clinic for evaluation and treatment of diabetic feet. Tal has a past medical history of Abnormal results of liver function studies  (09/23/2020), Amblyopia, Anatomical narrow angle of both eyes, Arthritis of hand, BPH (benign prostatic hyperplasia), Cataract, Colon polyps, COPD (chronic obstructive pulmonary disease), Coronary artery disease, Diabetes mellitus, Elevated liver enzymes (09/23/2020), Hypertension, WINSOME on CPAP, Other hyperlipidemia, Sensorineural hearing loss (SNHL) of left ear, Superficial thrombophlebitis, and Superficial thrombophlebitis. Patient relates having toenails that are in need of trimming.  Denies being painful with wearing shoe gear.  Has not attempted to treat.  Continues with good control over his blood glucose.  Denies overt neuropathy pain with today's exam.  Notes having a blister that developed to the Rt. Distal hallux.  States this resolved on its own.  He is unable to recall an inciting event leading to said symptoms.  Denies noticing any signs of infection to the digit.  Denies any additional pedal complaints.    PCP: Marlon Iglesias MD    Date Last Seen by PCP: 5/25    Hemoglobin A1C   Date Value Ref Range Status   10/30/2024 6.7 (H) 4.0 - 5.6 % Final     Comment:     ADA Screening Guidelines:  5.7-6.4%  Consistent with prediabetes  >or=6.5%  Consistent with diabetes    High levels of fetal hemoglobin interfere with the HbA1C  assay. Heterozygous hemoglobin variants (HbS, HgC, etc)do  not significantly interfere with this assay.   However, presence of multiple variants may affect accuracy.     04/29/2024 7.0 (H) 4.0 - 5.6 % Final     Comment:     ADA Screening Guidelines:  5.7-6.4%  Consistent with prediabetes  >or=6.5%  Consistent with diabetes    High levels of fetal hemoglobin interfere  with the HbA1C  assay. Heterozygous hemoglobin variants (HbS, HgC, etc)do  not significantly interfere with this assay.   However, presence of multiple variants may affect accuracy.     11/03/2023 7.1 (H) 4.0 - 5.6 % Final     Comment:     ADA Screening Guidelines:  5.7-6.4%  Consistent with prediabetes  >or=6.5%  Consistent with diabetes    High levels of fetal hemoglobin interfere with the HbA1C  assay. Heterozygous hemoglobin variants (HbS, HgC, etc)do  not significantly interfere with this assay.   However, presence of multiple variants may affect accuracy.       Hemoglobin A1c   Date Value Ref Range Status   07/24/2025 6.7 (H) 4.0 - 5.6 % Final     Comment:     ADA Screening Guidelines:  5.7-6.4%  Consistent with prediabetes  >=6.5%  Consistent with diabetes    High levels of fetal hemoglobin interfere with the HbA1C  assay. Heterozygous hemoglobin variants (HbS, HgC, etc)do  not significantly interfere with this assay.   However, presence of multiple variants may affect accuracy.   05/16/2025 7.3 (H) 4.0 - 5.6 % Final     Comment:     ADA Screening Guidelines:  5.7-6.4%  Consistent with prediabetes  >=6.5%  Consistent with diabetes    High levels of fetal hemoglobin interfere with the HbA1C  assay. Heterozygous hemoglobin variants (HbS, HgC, etc)do  not significantly interfere with this assay.   However, presence of multiple variants may affect accuracy.   05/01/2025 7.4 (H) 4.0 - 5.6 % Final     Comment:     ADA Screening Guidelines:  5.7-6.4%  Consistent with prediabetes  >=6.5%  Consistent with diabetes    High levels of fetal hemoglobin interfere with the HbA1C  assay. Heterozygous hemoglobin variants (HbS, HgC, etc)do  not significantly interfere with this assay.   However, presence of multiple variants may affect accuracy.           Past Medical History:   Diagnosis Date    Abnormal results of liver function studies  09/23/2020    Amblyopia     OD    Anatomical narrow angle of both eyes     Arthritis of  hand     BPH (benign prostatic hyperplasia)     Cataract     Colon polyps     COPD (chronic obstructive pulmonary disease)     Coronary artery disease     Diabetes mellitus     Elevated liver enzymes 09/23/2020    Hypertension     WINSOME on CPAP     Other hyperlipidemia     Sensorineural hearing loss (SNHL) of left ear     Superficial thrombophlebitis     Superficial thrombophlebitis        Past Surgical History:   Procedure Laterality Date    APPENDECTOMY      blephroplasty Bilateral 2009    CATARACT EXTRACTION W/  INTRAOCULAR LENS IMPLANT Left 7/17/2024    Procedure: EXTRACTION, CATARACT, WITH IOL INSERTION;  Surgeon: Radha Bedoya MD;  Location: [facility] OR;  Service: Ophthalmology;  Laterality: Left;    CATARACT EXTRACTION W/  INTRAOCULAR LENS IMPLANT Right 8/7/2024    Procedure: EXTRACTION, CATARACT, WITH IOL INSERTION;  Surgeon: Radha Bedoya MD;  Location: [facility] OR;  Service: Ophthalmology;  Laterality: Right;    COLONOSCOPY      COLONOSCOPY N/A 3/12/2021    Procedure: COLONOSCOPY;  Surgeon: Dandre Hobbs Jr., MD;  Location: [facility];  Service: Endoscopy;  Laterality: N/A;    DUPUYTREN CONTRACTURE RELEASE Bilateral     HERNIA REPAIR      Umbilical    TONSILLECTOMY         Family History   Problem Relation Name Age of Onset    Hypertension Mother      Diabetes Mother      Cataracts Mother      Glaucoma Mother          ?    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    Tobacco Use    Smoking status: Never    Smokeless tobacco: Never   Substance and Sexual Activity    Alcohol use: Yes     Alcohol/week: 7.0 standard drinks of alcohol     Types: 7 Glasses of wine per week    Drug use: Never    Sexual activity: Yes     Partners: Female   Social History Narrative    **  Merged History Encounter **          Social Drivers of Health     Financial Resource Strain: Low Risk  (5/7/2025)    Overall Financial Resource Strain (CARDIA)     Difficulty of Paying Living Expenses: Not hard at all   Food Insecurity: No Food Insecurity (5/7/2025)    Hunger Vital Sign     Worried About Running Out of Food in the Last Year: Never true     Ran Out of Food in the Last Year: Never true   Transportation Needs: No Transportation Needs (5/7/2025)    PRAPARE - Transportation     Lack of Transportation (Medical): No     Lack of Transportation (Non-Medical): No   Physical Activity: Insufficiently Active (5/7/2025)    Exercise Vital Sign     Days of Exercise per Week: 3 days     Minutes of Exercise per Session: 30 min   Stress: No Stress Concern Present (5/7/2025)    [employer] [place] [employer] - Occupational Stress Questionnaire     Feeling of Stress : Not at all   Housing Stability: Low Risk  (5/7/2025)    Housing Stability Vital Sign     Unable to Pay for Housing in the Last Year: No     Homeless in the Last Year: No       Current Outpatient Medications   Medication Sig Dispense Refill    allopurinoL (ZYLOPRIM) 100 MG tablet Take 2 tablets (200 mg total) by mouth once daily. 180 tablet 3    amLODIPine (NORVASC) 10 MG tablet Take 1 tablet (10 mg total) by mouth once daily. 90 tablet 3    aspirin (ECOTRIN) 81 MG EC tablet Take 1 tablet (81 mg total) by mouth once daily.  0    atorvastatin (LIPITOR) 40 MG tablet Take 1 tablet (40 mg total) by mouth every evening. 90 tablet 3    calcipotriene-betamethasone 0.005-0.064 % Crea Apply 1 application topically once daily at 6am. 60 g 3    clotrimazole-betamethasone 1-0.05% (LOTRISONE) cream Apply topically 2 (two) times daily.      FARXIGA 5 mg Tab tablet TAKE 1 TABLET(5 MG) BY MOUTH DAILY 90 tablet 1    finasteride (PROSCAR) 5 mg tablet Take 1 tablet (5 mg total) by mouth once daily. 90 tablet 3    FREESTYLE LITE STRIPS Strp USE AS DIRECTED TO TEST  "ONCE DAILY 100 strip 3    glipiZIDE (GLUCOTROL) 10 MG TR24 Take 1 tablet (10 mg total) by mouth daily with breakfast. 90 tablet 3    glucosamine-chondroitin 500-400 mg tablet Take 1 tablet by mouth 3 (three) times daily.      indomethacin (INDOCIN) 50 MG capsule Take 1 capsule (50 mg total) by mouth 2 (two) times daily as needed (gout flare). 30 capsule 1    lancets (ACCU-CHEK FASTCLIX LANCET DRUM) Misc Inject 1 Units into the skin 2 (two) times a day. 200 each 11    LANCETS MISC by Misc.(Non-Drug; Combo Route) route.      levothyroxine (SYNTHROID) 150 MCG tablet Take 1 tablet (150 mcg total) by mouth before breakfast. 90 tablet 3    lisinopriL-hydrochlorothiazide (PRINZIDE,ZESTORETIC) 20-12.5 mg per tablet Take 1 tablet by mouth 2 (two) times a day. TAKE 1 TABLET BY MOUTH TWICE DAILY 180 tablet 3    metFORMIN (GLUCOPHAGE) 1000 MG tablet Take 1 tablet (1,000 mg total) by mouth 2 (two) times daily with meals. 180 tablet 3    metoprolol succinate (TOPROL-XL) 200 MG 24 hr tablet Take 1 tablet (200 mg total) by mouth once daily. 90 tablet 3    omega-3 fatty acids/fish oil (FISH OIL-OMEGA-3 FATTY ACIDS) 300-1,000 mg capsule Take by mouth once daily.      pen needle, diabetic (BD NIC 2ND GEN PEN NEEDLE) 32 gauge x 5/32" Ndle USE ONCE DAILY AS DIRECTED 100 each 3    prednisoLONE-moxiflox-bromfen 1-0.5-0.075 % DrpS Apply 1 drop to eye 3 (three) times daily. 5 mL 3    prednisoLONE-moxiflox-bromfen 1-0.5-0.075 % DrpS Apply 1 drop to eye 3 (three) times daily. 5 mL 3    resmetirom (REZDIFFRA) 100 mg Tab Take 100 mg by mouth once daily. 30 tablet 11    semaglutide (OZEMPIC) 2 mg/dose (8 mg/3 mL) PnIj Inject 2 mg into the skin every 7 days. 9 mL 3    tamsulosin (FLOMAX) 0.4 mg Cap Take 1 capsule (0.4 mg total) by mouth once daily. 90 capsule 3    triamcinolone acetonide 0.1% (KENALOG) 0.1 % cream Apply topically 2 (two) times daily. 453.6 g 0    blood-glucose meter kit To check BG 2 times daily, to use with insurance preferred " meter 1 each 0     No current facility-administered medications for this visit.       Review of patient's allergies indicates:  No Known Allergies      Review of Systems   Constitutional: Negative for chills and fever.   Cardiovascular:  Positive for leg swelling. Negative for claudication.   Skin:  Positive for color change, nail changes and suspicious lesions.   Musculoskeletal:  Positive for joint swelling. Negative for joint pain, muscle cramps, muscle weakness and myalgias.   Neurological:  Negative for numbness and paresthesias.   Psychiatric/Behavioral:  Negative for altered mental status.            Objective:      Physical Exam  Constitutional:       General: He is not in acute distress.     Appearance: He is well-developed. He is not diaphoretic.   Cardiovascular:      Pulses:           Dorsalis pedis pulses are 2+ on the right side and 2+ on the left side.        Posterior tibial pulses are 2+ on the right side and 2+ on the left side.      Comments: CFT is < 3 seconds bilateral.  Pedal hair growth is present bilateral.  Mild varicosities noted bilateral.  Moderate non pitting lower extremity edema noted bilateral.  Toes are warm to touch bilateral.    Musculoskeletal:         General: No tenderness.      Comments: Muscle strength 5/5 in all muscle groups bilateral.  No tenderness nor crepitation with ROM of foot/ankle joints bilateral.  No tenderness with palpation of bilateral foot and ankle.  Bilateral pes planus foot type.  Bilateral semi-rigid contracture of toes 2-5.   Skin:     General: Skin is warm and dry.      Capillary Refill: Capillary refill takes 2 to 3 seconds.      Coloration: Skin is not pale.      Findings: Lesion present. No abrasion, bruising, burn, ecchymosis, erythema, laceration or rash.      Comments: Pedal skin appears edematous bilateral.  Toenails x 10 appear thickened by 2 mm elongated by 6 mm, and discolored with subungual debris.  Porokeratosis noted to bilateral sub 3rd met  head.   Neurological:      Mental Status: He is alert and oriented to person, place, and time.      Sensory: Sensory deficit present.      Motor: No weakness or atrophy.      Comments: Protective sensation per Lamont-Derick monofilament is intact bilateral.  Vibratory sensation is decreased bilateral.  Light touch is intact bilateral.               Assessment:       Encounter Diagnoses   Name Primary?    Diabetic polyneuropathy associated with type 2 diabetes mellitus Yes    Hammer toes of both feet     Onychomycosis due to dermatophyte     Severe obesity (BMI 35.0-39.9) with comorbidity     Porokeratosis          Plan:       Tal was seen today for diabetes mellitus and nail care.    Diagnoses and all orders for this visit:    Diabetic polyneuropathy associated with type 2 diabetes mellitus    Hammer toes of both feet    Onychomycosis due to dermatophyte    Severe obesity (BMI 35.0-39.9) with comorbidity    Porokeratosis      I counseled the patient on his conditions, their implications and medical management.    Advised to continue wearing shoe gear that accommodates digital deformities.    Discussed the importance of diet and exercise as they pertain to diabetes management and maintaining a healthy BMI.     Shoe inspection. Diabetic Foot Education. Patient reminded of the importance of good nutrition and blood sugar control to help prevent podiatric complications of diabetes. Patient instructed on proper foot hygeine. We discussed wearing proper shoe gear, daily foot inspections, never walking without protective shoe gear, never putting sharp instruments to feet.    With patient's permission, nails were aggressively reduced and debrided x 10  to their soft tissue attachment mechanically and with electric , removing all offending nail and debris.  Also, a sterile #15 scalpel was used to trim lesions x 2 down to smooth appearing skin without incident.  Patient relates relief following the procedure. He  will continue to monitor the areas daily, inspect his feet, wear protective shoe gear when ambulatory, moisturizer to maintain skin integrity and follow in this office in approximately 4 months, sooner [email or web address].    Alex Olson DPM

## 2025-07-29 RX ORDER — TRIAMCINOLONE ACETONIDE 1 MG/G
CREAM TOPICAL 2 TIMES DAILY
Qty: 453.6 G | Refills: 0 | Status: SHIPPED | OUTPATIENT
Start: 2025-07-29

## 2025-07-31 ENCOUNTER — TELEPHONE (OUTPATIENT)
Dept: ENDOSCOPY | Facility: HOSPITAL | Age: 75
End: 2025-07-31
Payer: MEDICARE

## 2025-07-31 NOTE — TELEPHONE ENCOUNTER
Spoke with patient, confirmed procedure date (Aug 4) and arrival time (0945). No new questions at this time.

## 2025-08-01 RX ORDER — BLOOD-GLUCOSE METER
KIT MISCELLANEOUS
Qty: 100 STRIP | Refills: 3 | Status: SHIPPED | OUTPATIENT
Start: 2025-08-01

## 2025-08-01 NOTE — TELEPHONE ENCOUNTER
Refill Decision Note   Tal Joel  is requesting a refill authorization.  Brief Assessment and Rationale for Refill:  Approve     Medication Therapy Plan:        Comments:     Note composed:2:46 PM 08/01/2025

## 2025-08-01 NOTE — TELEPHONE ENCOUNTER
No care due was identified.  [facility] Embedded Care Due Messages. Reference number: [id number].   8/01/2025 8:01:23 AM CDT

## 2025-08-04 ENCOUNTER — HOSPITAL ENCOUNTER (OUTPATIENT)
Facility: HOSPITAL | Age: 75
Discharge: HOME | End: 2025-08-04
Attending: INTERNAL MEDICINE | Admitting: INTERNAL MEDICINE
Payer: MEDICARE

## 2025-08-04 ENCOUNTER — PATIENT MESSAGE (OUTPATIENT)
Dept: GASTROENTEROLOGY | Facility: CLINIC | Age: 75
End: 2025-08-04

## 2025-08-04 ENCOUNTER — ANESTHESIA (OUTPATIENT)
Dept: ENDOSCOPY | Facility: HOSPITAL | Age: 75
End: 2025-08-04
Payer: MEDICARE

## 2025-08-04 ENCOUNTER — ANESTHESIA EVENT (OUTPATIENT)
Dept: ENDOSCOPY | Facility: HOSPITAL | Age: 75
End: 2025-08-04
Payer: MEDICARE

## 2025-08-04 VITALS
HEART RATE: 70 BPM | SYSTOLIC BLOOD PRESSURE: 151 MMHG | TEMPERATURE: 97 F | WEIGHT: 260 LBS | HEIGHT: 69 IN | RESPIRATION RATE: 17 BRPM | BODY MASS INDEX: 38.51 KG/M2 | DIASTOLIC BLOOD PRESSURE: 70 MMHG | OXYGEN SATURATION: 94 %

## 2025-08-04 DIAGNOSIS — R10.9 ABDOMINAL PAIN: ICD-10-CM

## 2025-08-04 DIAGNOSIS — K80.50 CHOLEDOCHOLITHIASIS: Primary | ICD-10-CM

## 2025-08-04 LAB — POCT GLUCOSE: 186 MG/DL (ref 70–110)

## 2025-08-04 PROCEDURE — 63600175 PHARM REV CODE 636 W HCPCS: Performed by: NURSE ANESTHETIST, CERTIFIED REGISTERED

## 2025-08-04 PROCEDURE — 43262 ENDO CHOLANGIOPANCREATOGRAPH: CPT | Mod: XS,,, | Performed by: INTERNAL MEDICINE

## 2025-08-04 PROCEDURE — 25000003 PHARM REV CODE 250

## 2025-08-04 PROCEDURE — 43274 ERCP DUCT STENT PLACEMENT: CPT | Mod: ,,, | Performed by: INTERNAL MEDICINE

## 2025-08-04 PROCEDURE — 43262 ENDO CHOLANGIOPANCREATOGRAPH: CPT | Mod: XS | Performed by: INTERNAL MEDICINE

## 2025-08-04 PROCEDURE — 43259 EGD US EXAM DUODENUM/JEJUNUM: CPT | Performed by: INTERNAL MEDICINE

## 2025-08-04 PROCEDURE — 43274 ERCP DUCT STENT PLACEMENT: CPT | Performed by: INTERNAL MEDICINE

## 2025-08-04 PROCEDURE — 37000008 HC ANESTHESIA 1ST 15 MINUTES: Performed by: INTERNAL MEDICINE

## 2025-08-04 PROCEDURE — 27201674 HC SPHINCTERTOME: Performed by: INTERNAL MEDICINE

## 2025-08-04 PROCEDURE — 37000009 HC ANESTHESIA EA ADD 15 MINS: Performed by: INTERNAL MEDICINE

## 2025-08-04 PROCEDURE — 25000003 PHARM REV CODE 250: Performed by: NURSE ANESTHETIST, CERTIFIED REGISTERED

## 2025-08-04 PROCEDURE — 43259 EGD US EXAM DUODENUM/JEJUNUM: CPT | Mod: 51,,, | Performed by: INTERNAL MEDICINE

## 2025-08-04 PROCEDURE — C2617 STENT, NON-COR, TEM W/O DEL: HCPCS | Performed by: INTERNAL MEDICINE

## 2025-08-04 PROCEDURE — 25500020 PHARM REV CODE 255: Performed by: INTERNAL MEDICINE

## 2025-08-04 RX ORDER — DEXAMETHASONE SODIUM PHOSPHATE 4 MG/ML
INJECTION, SOLUTION INTRA-ARTICULAR; INTRALESIONAL; INTRAMUSCULAR; INTRAVENOUS; SOFT TISSUE
Status: DISCONTINUED | OUTPATIENT
Start: 2025-08-04 | End: 2025-08-04

## 2025-08-04 RX ORDER — ONDANSETRON HYDROCHLORIDE 2 MG/ML
4 INJECTION, SOLUTION INTRAVENOUS DAILY PRN
Status: DISCONTINUED | OUTPATIENT
Start: 2025-08-04 | End: 2025-08-04 | Stop reason: HOSPADM

## 2025-08-04 RX ORDER — PHENYLEPHRINE HYDROCHLORIDE 10 MG/ML
INJECTION INTRAVENOUS
Status: DISCONTINUED | OUTPATIENT
Start: 2025-08-04 | End: 2025-08-04

## 2025-08-04 RX ORDER — PROPOFOL 10 MG/ML
VIAL (ML) INTRAVENOUS CONTINUOUS PRN
Status: DISCONTINUED | OUTPATIENT
Start: 2025-08-04 | End: 2025-08-04

## 2025-08-04 RX ORDER — SUCCINYLCHOLINE CHLORIDE 20 MG/ML
INJECTION INTRAMUSCULAR; INTRAVENOUS
Status: DISCONTINUED | OUTPATIENT
Start: 2025-08-04 | End: 2025-08-04

## 2025-08-04 RX ORDER — PROPOFOL 10 MG/ML
VIAL (ML) INTRAVENOUS
Status: DISCONTINUED | OUTPATIENT
Start: 2025-08-04 | End: 2025-08-04

## 2025-08-04 RX ORDER — SODIUM CHLORIDE 0.9 % (FLUSH) 0.9 %
20 SYRINGE (ML) INJECTION ONCE
Status: DISCONTINUED | OUTPATIENT
Start: 2025-08-04 | End: 2025-08-04 | Stop reason: HOSPADM

## 2025-08-04 RX ORDER — ONDANSETRON HYDROCHLORIDE 2 MG/ML
INJECTION, SOLUTION INTRAVENOUS
Status: DISCONTINUED | OUTPATIENT
Start: 2025-08-04 | End: 2025-08-04

## 2025-08-04 RX ORDER — LIDOCAINE HYDROCHLORIDE 20 MG/ML
INJECTION, SOLUTION EPIDURAL; INFILTRATION; INTRACAUDAL; PERINEURAL
Status: DISCONTINUED | OUTPATIENT
Start: 2025-08-04 | End: 2025-08-04

## 2025-08-04 RX ORDER — ROCURONIUM BROMIDE 10 MG/ML
INJECTION, SOLUTION INTRAVENOUS
Status: DISCONTINUED | OUTPATIENT
Start: 2025-08-04 | End: 2025-08-04

## 2025-08-04 RX ORDER — SODIUM CHLORIDE 9 MG/ML
INJECTION, SOLUTION INTRAVENOUS CONTINUOUS
Status: DISCONTINUED | OUTPATIENT
Start: 2025-08-04 | End: 2025-08-04 | Stop reason: HOSPADM

## 2025-08-04 RX ORDER — DEXMEDETOMIDINE HYDROCHLORIDE 100 UG/ML
INJECTION, SOLUTION INTRAVENOUS
Status: DISCONTINUED | OUTPATIENT
Start: 2025-08-04 | End: 2025-08-04

## 2025-08-04 RX ORDER — TOPICAL ANESTHETIC 200 MG/ML
SPRAY DENTAL; PERIODONTAL
Status: DISCONTINUED | OUTPATIENT
Start: 2025-08-04 | End: 2025-08-04

## 2025-08-04 RX ADMIN — GLYCOPYRROLATE 0.2 MG: 0.2 INJECTION, SOLUTION INTRAMUSCULAR; INTRAVITREAL at 09:08

## 2025-08-04 RX ADMIN — DEXAMETHASONE SODIUM PHOSPHATE 8 MG: 4 INJECTION, SOLUTION INTRAMUSCULAR; INTRAVENOUS at 10:08

## 2025-08-04 RX ADMIN — ONDANSETRON 8 MG: 2 INJECTION, SOLUTION INTRAMUSCULAR; INTRAVENOUS at 10:08

## 2025-08-04 RX ADMIN — PROPOFOL 100 MG: 10 INJECTION, EMULSION INTRAVENOUS at 10:08

## 2025-08-04 RX ADMIN — TOPICAL ANESTHETIC 1 EACH: 200 SPRAY DENTAL; PERIODONTAL at 09:08

## 2025-08-04 RX ADMIN — DEXMEDETOMIDINE HYDROCHLORIDE 8 MCG: 100 INJECTION, SOLUTION, CONCENTRATE INTRAVENOUS at 09:08

## 2025-08-04 RX ADMIN — LIDOCAINE HYDROCHLORIDE 50 MG: 20 INJECTION, SOLUTION EPIDURAL; INFILTRATION; INTRACAUDAL; PERINEURAL at 10:08

## 2025-08-04 RX ADMIN — SUGAMMADEX 200 MG: 100 INJECTION, SOLUTION INTRAVENOUS at 11:08

## 2025-08-04 RX ADMIN — PROPOFOL 50 MG: 10 INJECTION, EMULSION INTRAVENOUS at 10:08

## 2025-08-04 RX ADMIN — SUCCINYLCHOLINE CHLORIDE 160 MG: 20 INJECTION, SOLUTION INTRAMUSCULAR; INTRAVENOUS at 10:08

## 2025-08-04 RX ADMIN — PROPOFOL 150 MCG/KG/MIN: 10 INJECTION, EMULSION INTRAVENOUS at 10:08

## 2025-08-04 RX ADMIN — LIDOCAINE HYDROCHLORIDE 50 MG: 20 INJECTION, SOLUTION EPIDURAL; INFILTRATION; INTRACAUDAL; PERINEURAL at 09:08

## 2025-08-04 RX ADMIN — SODIUM CHLORIDE: 0.9 INJECTION, SOLUTION INTRAVENOUS at 09:08

## 2025-08-04 RX ADMIN — ROCURONIUM BROMIDE 30 MG: 10 INJECTION, SOLUTION INTRAVENOUS at 10:08

## 2025-08-04 RX ADMIN — PROPOFOL 70 MG: 10 INJECTION, EMULSION INTRAVENOUS at 10:08

## 2025-08-04 RX ADMIN — PHENYLEPHRINE HYDROCHLORIDE 200 MCG: 10 INJECTION INTRAVENOUS at 11:08

## 2025-08-04 RX ADMIN — PHENYLEPHRINE HYDROCHLORIDE 100 MCG: 10 INJECTION INTRAVENOUS at 10:08

## 2025-08-04 NOTE — ANESTHESIA PREPROCEDURE EVALUATION
08/04/2025  Tal Maldonado is a 75 y.o., male.      Pre-op Assessment    I have reviewed the Patient Summary Reports.     I have reviewed the Nursing Notes. I have reviewed the NPO Status.   I have reviewed the Medications.     Review of Systems  Anesthesia Hx:  No problems with previous Anesthesia               Denies Personal Hx of Anesthesia complications.                    Social:  Non-Smoker       Cardiovascular:     Hypertension           hyperlipidemia                               Pulmonary:        Sleep Apnea                Renal/:  Renal/ Normal                 Hepatic/GI:      Liver Disease,               Neurological:    Neuromuscular Disease,                                   Endocrine:  Diabetes         Morbid Obesity / BMI > 40      Physical Exam  General: Cooperative, Alert and Oriented    Airway:  Mallampati: II   Mouth Opening: Normal  TM Distance: Normal  Tongue: Normal  Neck ROM: Normal ROM        Anesthesia Plan  Type of Anesthesia, risks & benefits discussed:    Anesthesia Type: Gen Natural Airway, Gen ETT  Intra-op Monitoring Plan: Standard ASA Monitors  Post Op Pain Control Plan: multimodal analgesia  Induction:  IV  Informed Consent: Informed consent signed with the Patient and all parties understand the risks and agree with anesthesia plan.  All questions answered.   ASA Score: 3  Day of Surgery Review of History & Physical: H&P Update referred to the surgeon/provider.  Anesthesia Plan Notes: Will intubate if proceeding with ERCP    Ready For Surgery From Anesthesia Perspective.     .

## 2025-08-04 NOTE — [employer] PATIENT INSTRUCTIONS
Discharge Summary/Instructions after an Endoscopic Procedure  Patient Name: Tal Maldonado  Patient MRN: [id number]  Patient YOB: 1950  Monday, August 4, 2025  Marlon Burks MD  Dear patient,  As a result of recent federal legislation (The Federal Cures Act), you may   receive lab or pathology results from your procedure in your [facility]   account before your physician is able to contact you. Your physician or   their representative will relay the results to you with their   recommendations at their soonest availability.  Thank you,  Your health is very important to us during the Covid Crisis. Following your   procedure today, you will receive a daily text for 2 weeks asking about   signs or symptoms of Covid 19.  Please respond to this text when you   receive it so we can follow up and keep you as safe as possible.   RESTRICTIONS:  During your procedure today, you received medications for sedation.  These   medications may affect your judgment, balance and coordination.  Therefore,   for 24 hours, you have the following restrictions:   - DO NOT drive a car, operate machinery, make legal/financial decisions,   sign important papers or drink alcohol.    ACTIVITY:  Today: no heavy lifting, straining or running due to procedural   sedation/anesthesia.  The following day: return to full activity including work.  DIET:  Eat and drink normally unless instructed otherwise.     TREATMENT FOR COMMON SIDE EFFECTS:  - Mild abdominal pain, nausea, belching, bloating or excessive gas:  rest,   eat lightly and use a heating pad.  - Sore Throat: treat with throat lozenges and/or gargle with warm salt   water.  - Because air was used during the procedure, expelling large amounts of air   from your rectum or belching is normal.  - If a bowel prep was taken, you may not have a bowel movement for 1-3 days.    This is normal.  SYMPTOMS TO WATCH FOR AND REPORT TO YOUR PHYSICIAN:  1. Abdominal pain or bloating, other than gas  Neck , no lymphadenopathy cramps.  2. Chest pain.  3. Back pain.  4. Signs of infection such as: chills or fever occurring within 24 hours   after the procedure.  5. Rectal bleeding, which would show as bright red, maroon, or black stools.   (A tablespoon of blood from the rectum is not serious, especially if   hemorrhoids are present.)  6. Vomiting.  7. Weakness or dizziness.  GO DIRECTLY TO THE NEAREST EMERGENCY ROOM IF YOU HAVE ANY OF THE FOLLOWING:      Difficulty breathing              Chills and/or fever over 101 F   Persistent vomiting and/or vomiting blood   Severe abdominal pain   Severe chest pain   Black, tarry stools   Bleeding- more than one tablespoon   Any other symptom or condition that you feel may need urgent attention  Your doctor recommends these additional instructions:  If any biopsies were taken, your doctors clinic will contact you in 1 to 2   weeks with any results.  - Discharge patient to home.   - Resume previous diet.   - Continue present medications.   - Perform an ERCP today.   - Perform magnetic resonance imaging (MRI) with gadolinium in 1 year for   cyst surveillance  For questions, problems or results please call your physician - Marlon Burks MD.  EMERGENCY PHONE NUMBER: [phone],  LAB RESULTS: [phone]  IF A COMPLICATION OR EMERGENCY SITUATION ARISES AND YOU ARE UNABLE TO REACH   YOUR PHYSICIAN - GO DIRECTLY TO THE EMERGENCY ROOM.  Marlon Burks MD  8/4/2025 10:20:19 AM  This report has been verified and signed electronically.  Dear patient,  As a result of recent federal legislation (The Federal Cures Act), you may   receive lab or pathology results from your procedure in your [facility]   account before your physician is able to contact you. Your physician or   their representative will relay the results to you with their   recommendations at their soonest availability.  Thank you,  [employer]ATION

## 2025-08-04 NOTE — ANESTHESIA PROCEDURE NOTES
Intubation    Date/Time: 8/4/2025 10:18 AM    Performed by: Sparkle Andrade CRNA  Authorized by: Wood Marcial MD    Intubation:     Induction:  Intravenous    Intubated:  Postinduction    Mask Ventilation:  N/a    Attempts:  1    Attempted By:  CRNA    Method of Intubation:  Video laryngoscopy    Blade:  Souza 4    Difficult Airway Encountered?: No      Airway Device:  Oral endotracheal tube    Airway Device Size:  7.5    Style/Cuff Inflation:  Cuffed (inflated to minimal occlusive pressure)    Tube secured:  22    Secured at:  The lips    Placement Verified By:  Capnometry    Complicating Factors:  Poor neck/head extension, short neck and obesity    Findings Post-Intubation:  BS equal bilateral and atraumatic/condition of teeth unchanged

## 2025-08-04 NOTE — PROVATION PATIENT INSTRUCTIONS
Discharge Summary/Instructions after an Endoscopic Procedure  Patient Name: Tal Maldonado  Patient MRN: [id number]  Patient YOB: 1950  Monday, August 4, 2025  Marlon Burks MD  Dear patient,  As a result of recent federal legislation (The Federal Cures Act), you may   receive lab or pathology results from your procedure in your [facility]   account before your physician is able to contact you. Your physician or   their representative will relay the results to you with their   recommendations at their soonest availability.  Thank you,  Your health is very important to us during the Covid Crisis. Following your   procedure today, you will receive a daily text for 2 weeks asking about   signs or symptoms of Covid 19.  Please respond to this text when you   receive it so we can follow up and keep you as safe as possible.   RESTRICTIONS:  During your procedure today, you received medications for sedation.  These   medications may affect your judgment, balance and coordination.  Therefore,   for 24 hours, you have the following restrictions:   - DO NOT drive a car, operate machinery, make legal/financial decisions,   sign important papers or drink alcohol.    ACTIVITY:  Today: no heavy lifting, straining or running due to procedural   sedation/anesthesia.  The following day: return to full activity including work.  DIET:  Eat and drink normally unless instructed otherwise.     TREATMENT FOR COMMON SIDE EFFECTS:  - Mild abdominal pain, nausea, belching, bloating or excessive gas:  rest,   eat lightly and use a heating pad.  - Sore Throat: treat with throat lozenges and/or gargle with warm salt   water.  - Because air was used during the procedure, expelling large amounts of air   from your rectum or belching is normal.  - If a bowel prep was taken, you may not have a bowel movement for 1-3 days.    This is normal.  SYMPTOMS TO WATCH FOR AND REPORT TO YOUR PHYSICIAN:  1. Abdominal pain or bloating, other than gas  cramps.  2. Chest pain.  3. Back pain.  4. Signs of infection such as: chills or fever occurring within 24 hours   after the procedure.  5. Rectal bleeding, which would show as bright red, maroon, or black stools.   (A tablespoon of blood from the rectum is not serious, especially if   hemorrhoids are present.)  6. Vomiting.  7. Weakness or dizziness.  GO DIRECTLY TO THE NEAREST EMERGENCY ROOM IF YOU HAVE ANY OF THE FOLLOWING:      Difficulty breathing              Chills and/or fever over 101 F   Persistent vomiting and/or vomiting blood   Severe abdominal pain   Severe chest pain   Black, tarry stools   Bleeding- more than one tablespoon   Any other symptom or condition that you feel may need urgent attention  Your doctor recommends these additional instructions:  If any biopsies were taken, your doctors clinic will contact you in 1 to 2   weeks with any results.  - Discharge patient to home.   - Resume previous diet.   - Continue present medications.   - Repeat [place] [facility] [place] in [place] 5 to be scheduled.  The procedure was techincally challenging duie to scope position  For questions, problems or results please call your physician - Marlon Burks MD.  EMERGENCY PHONE NUMBER: [phone],  LAB RESULTS: [phone]  IF A COMPLICATION OR EMERGENCY SITUATION ARISES AND YOU ARE UNABLE TO REACH   YOUR PHYSICIAN - GO DIRECTLY TO THE EMERGENCY ROOM.  Marlon Burks MD  8/4/2025 11:19:46 AM  This report has been verified and signed electronically.  Dear patient,  As a result of recent federal legislation (The Federal Cures Act), you may   receive lab or pathology results from your procedure in your [facility]   account before your physician is able to contact you. Your physician or   their representative will relay the results to you with their   recommendations at their soonest availability.  Thank you,  PROVATION   29-Jan-2025

## 2025-08-04 NOTE — ANESTHESIA POSTPROCEDURE EVALUATION
Anesthesia Post Evaluation    Patient: Tal Maldonado    Procedure(s) Performed: Procedure(s) (LRB):  ULTRASOUND, UPPER GI TRACT, ENDOSCOPIC (N/A)  ERCP (ENDOSCOPIC RETROGRADE CHOLANGIOPANCREATOGRAPHY) (N/A)    Final Anesthesia Type: general      Patient location during evaluation: PACU  Patient participation: Yes- Able to Participate  Level of consciousness: awake and alert  Post-procedure vital signs: reviewed and stable  Pain management: adequate  Airway patency: patent    PONV status at discharge: No PONV  Anesthetic complications: no      Cardiovascular status: stable  Respiratory status: room air  Hydration status: euvolemic  Follow-up not needed.              Vitals Value Taken Time   /70 08/04/25 11:57   Temp 36.3 °C (97.3 °F) 08/04/25 11:40   Pulse 70 08/04/25 11:57   Resp 17 08/04/25 11:57   SpO2 94 % 08/04/25 11:57         No case tracking events are documented in the log.      Pain/Meek Score: Meek Score: 10 (8/4/2025 11:45 AM)

## 2025-08-04 NOTE — H&P
Short Stay Endoscopy History and Physical    PCP - Marlon Iglesias MD  Referring Physician - Stewart Samayoa PA-C  [address] [place] [address]    Procedure - eus/ercp  ASA - per anesthesia  Mallampati - per anesthesia  History of Anesthesia problems - no  Family history Anesthesia problems -  no   Plan of anesthesia - General    HPI:  This is a 75 y.o. male here for evaluation of: abnormal MRI    Reflux - no  Dysphagia - no  Abdominal pain - no  Diarrhea - no    ROS:  Constitutional: No fevers, chills, No weight loss  CV: No chest pain  Pulm: No cough, No shortness of breath  Ophtho: No vision changes  GI: see HPI  Derm: No rash    Medical History:  has a past medical history of Abnormal results of liver function studies  (09/23/2020), Amblyopia, Anatomical narrow angle of both eyes, Arthritis of hand, BPH (benign prostatic hyperplasia), Cataract, Colon polyps, COPD (chronic obstructive pulmonary disease), Coronary artery disease, Diabetes mellitus, Elevated liver enzymes (09/23/2020), Hypertension, WINSOME on CPAP, Other hyperlipidemia, Sensorineural hearing loss (SNHL) of left ear, Superficial thrombophlebitis, and Superficial thrombophlebitis.    Surgical History:  has a past surgical history that includes Appendectomy; Hernia repair; Tonsillectomy; Dupuytren contracture release (Bilateral); blephroplasty (Bilateral, 2009); Colonoscopy; Colonoscopy (N/A, 3/12/2021); Cataract extraction w/  intraocular lens implant (Left, 7/17/2024); and Cataract extraction w/  intraocular lens implant (Right, 8/7/2024).    Family History: family history includes Cataracts in his mother; Diabetes in his mother; Emphysema in his father; Glaucoma in his mother; Hypertension in his mother; Prostate cancer in his brother..    Social History:  reports that he has never smoked. He has never used smokeless tobacco. He reports current alcohol use of about 7.0 standard drinks of alcohol per week. He reports that he does not  use drugs.    Review of patient's allergies indicates:  No Known Allergies    Medications:   Prescriptions Prior to Admission[1]    Physical Exam:    Vital Signs:   Vitals:    08/04/25 0937   BP: (!) 174/88   Pulse: 77   Resp: 20   Temp: 99.1 °F (37.3 °C)       General Appearance: Well appearing in no acute distress    Labs:  Lab Results   Component Value Date    WBC 6.71 05/16/2025    HGB 15.8 05/16/2025    HCT 48.4 05/16/2025     (L) 05/16/2025    CHOL 134 05/16/2025    TRIG 144 05/16/2025    HDL 34 (L) 05/16/2025    ALT 94 (H) 07/24/2025    AST 54 (H) 07/24/2025     05/16/2025    K 4.1 05/16/2025     05/16/2025    CREATININE 0.9 07/10/2025    BUN 10 05/16/2025    CO2 26 05/16/2025    TSH 2.268 05/16/2025    PSA 0.74 01/31/2025    INR 1.07 12/17/2020    HGBA1C 6.7 (H) 07/24/2025       I have explained the risks and benefits of this endoscopic procedure to the patient including but not limited to bleeding, inflammation, infection, perforation, and death.      Marlon Burks MD         [1]   Medications Prior to Admission   Medication Sig Dispense Refill Last Dose/Taking    allopurinoL (ZYLOPRIM) 100 MG tablet Take 2 tablets (200 mg total) by mouth once daily. 180 tablet 3 8/3/2025    amLODIPine (NORVASC) 10 MG tablet Take 1 tablet (10 mg total) by mouth once daily. 90 tablet 3 8/3/2025    aspirin (ECOTRIN) 81 MG EC tablet Take 1 tablet (81 mg total) by mouth once daily.  0 Past Week    atorvastatin (LIPITOR) 40 MG tablet Take 1 tablet (40 mg total) by mouth every evening. 90 tablet 3 8/3/2025    FARXIGA 5 mg Tab tablet TAKE 1 TABLET(5 MG) BY MOUTH DAILY 90 tablet 1 Past Week    finasteride (PROSCAR) 5 mg tablet Take 1 tablet (5 mg total) by mouth once daily. 90 tablet 3 8/3/2025    glipiZIDE (GLUCOTROL) 10 MG TR24 Take 1 tablet (10 mg total) by mouth daily with breakfast. 90 tablet 3 8/3/2025    glucosamine-chondroitin 500-400 mg tablet Take 1 tablet by mouth 3 (three) times daily.   8/3/2025     "indomethacin (INDOCIN) 50 MG capsule Take 1 capsule (50 mg total) by mouth 2 (two) times daily as needed (gout flare). 30 capsule 1 8/3/2025    levothyroxine (SYNTHROID) 150 MCG tablet Take 1 tablet (150 mcg total) by mouth before breakfast. 90 tablet 3 8/3/2025    lisinopriL-hydrochlorothiazide (PRINZIDE,ZESTORETIC) 20-12.5 mg per tablet Take 1 tablet by mouth 2 (two) times a day. TAKE 1 TABLET BY MOUTH TWICE DAILY 180 tablet 3 8/3/2025    metFORMIN (GLUCOPHAGE) 1000 MG tablet Take 1 tablet (1,000 mg total) by mouth 2 (two) times daily with meals. 180 tablet 3 8/3/2025    metoprolol succinate (TOPROL-XL) 200 MG 24 hr tablet Take 1 tablet (200 mg total) by mouth once daily. 90 tablet 3 8/3/2025    omega-3 fatty acids/fish oil (FISH OIL-OMEGA-3 FATTY ACIDS) 300-1,000 mg capsule Take by mouth once daily.   Past Week    tamsulosin (FLOMAX) 0.4 mg Cap Take 1 capsule (0.4 mg total) by mouth once daily. 90 capsule 3 8/3/2025    blood-glucose meter kit To check BG 2 times daily, to use with insurance preferred meter 1 each 0     calcipotriene-betamethasone 0.005-0.064 % Crea Apply 1 application topically once daily at 6am. 60 g 3     clotrimazole-betamethasone 1-0.05% (LOTRISONE) cream Apply topically 2 (two) times daily.       FREESTYLE LITE STRIPS Strp USE TO TEST BLOOD SUGAR ONCE DAILY AS DIRECTED 100 strip 3     lancets (ACCU-CHEK FASTCLIX LANCET DRUM) Mis Inject 1 Units into the skin 2 (two) times a day. 200 each 11     LANCETS MISC by Misc.(Non-Drug; Combo Route) route.       pen needle, diabetic (BD NIC 2ND GEN PEN NEEDLE) 32 gauge x 5/32" Ndle USE ONCE DAILY AS DIRECTED 100 each 3     prednisoLONE-moxiflox-bromfen 1-0.5-0.075 % DrpS Apply 1 drop to eye 3 (three) times daily. 5 mL 3     prednisoLONE-moxiflox-bromfen 1-0.5-0.075 % DrpS Apply 1 drop to eye 3 (three) times daily. 5 mL 3     resmetirom (REZDIFFRA) 100 mg Tab Take 100 mg by mouth once daily. 30 tablet 11     semaglutide (OZEMPIC) 2 mg/dose (8 mg/3 mL) " PnIj Inject 2 mg into the skin every 7 days. 9 mL 3 7/25/2025    triamcinolone acetonide 0.1% (KENALOG) 0.1 % cream Apply topically 2 (two) times daily. 453.6 g 0

## 2025-08-04 NOTE — TRANSFER OF CARE
"Anesthesia Transfer of Care Note    Patient: Tal Maldonado    Procedure(s) Performed: Procedure(s) (LRB):  ULTRASOUND, UPPER GI TRACT, ENDOSCOPIC (N/A)  ERCP (ENDOSCOPIC RETROGRADE CHOLANGIOPANCREATOGRAPHY) (N/A)    Patient location: PACU    Anesthesia Type: general    Transport from OR: Transported from OR on 6-10 L/min O2 by face mask with adequate spontaneous ventilation    Post pain: adequate analgesia    Post assessment: no apparent anesthetic complications    Post vital signs: stable    Level of consciousness: awake and alert    Nausea/Vomiting: no nausea/vomiting    Complications: none    Transfer of care protocol was followed      Last vitals: Visit Vitals  BP (!) 174/88   Pulse 77   Temp 37.3 °C (99.1 °F)   Resp 20   Ht 5' 9" (1.753 m)   Wt 117.9 kg (260 lb)   SpO2 95%   BMI 38.40 kg/m²     "

## 2025-08-05 ENCOUNTER — PATIENT MESSAGE (OUTPATIENT)
Dept: GASTROENTEROLOGY | Facility: CLINIC | Age: 75
End: 2025-08-05
Payer: MEDICARE

## 2025-08-05 ENCOUNTER — TELEPHONE (OUTPATIENT)
Dept: GASTROENTEROLOGY | Facility: CLINIC | Age: 75
End: 2025-08-05
Payer: MEDICARE

## 2025-08-05 ENCOUNTER — PATIENT MESSAGE (OUTPATIENT)
Facility: CLINIC | Age: 75
End: 2025-08-05
Payer: MEDICARE

## 2025-08-05 DIAGNOSIS — K80.50 CHOLEDOCHOLITHIASIS: Primary | ICD-10-CM

## 2025-08-05 NOTE — TELEPHONE ENCOUNTER
Copied from CRM #[id number]. Topic: General Inquiry - Patient Advice  >> Aug 5, 2025  1:03 PM Eric wrote:  Consult/Advisory    Name Of Caller:Pt    Contact Preference:[phone]    Treating Provider: HARSHA Méndez    Nature of call: Pt calling to schedule ERCP with  at [facility] since the [facility] didn't have the proper equipment to finish the procedure . Pt also wants HARSHA Méndez. to do the next procedure as well    Please Call to Advise,Thank you.

## 2025-08-06 ENCOUNTER — TELEPHONE (OUTPATIENT)
Dept: ENDOSCOPY | Facility: HOSPITAL | Age: 75
End: 2025-08-06
Payer: MEDICARE

## 2025-08-06 ENCOUNTER — TELEPHONE (OUTPATIENT)
Dept: GASTROENTEROLOGY | Facility: CLINIC | Age: 75
End: 2025-08-06
Payer: MEDICARE

## 2025-08-06 NOTE — TELEPHONE ENCOUNTER
Copied from CRM #[id number]. Topic: Appointments - Appointment Access  >> Aug 6, 2025  9:37 AM Carmel wrote:  Pt is calling to reschedule procedure 8/12, pt need to have procedure at [facility] location with Dr. Marlon Burks for repeat for ERCP, asking for call back

## 2025-08-06 NOTE — TELEPHONE ENCOUNTER
Patient r/s [place] 8[place]15 [place] [facility] instructions sent via portal.

## 2025-08-10 PROBLEM — K86.2 PANCREATIC CYST: Status: ACTIVE | Noted: 2025-08-10

## 2025-08-14 RX ORDER — SODIUM CHLORIDE 9 MG/ML
INJECTION, SOLUTION INTRAVENOUS CONTINUOUS
OUTPATIENT
Start: 2025-08-14

## 2025-08-15 ENCOUNTER — ANESTHESIA EVENT (OUTPATIENT)
Dept: ENDOSCOPY | Facility: HOSPITAL | Age: 75
End: 2025-08-15
Payer: MEDICARE

## 2025-08-15 ENCOUNTER — ANESTHESIA (OUTPATIENT)
Dept: ENDOSCOPY | Facility: HOSPITAL | Age: 75
End: 2025-08-15
Payer: MEDICARE

## 2025-08-15 ENCOUNTER — HOSPITAL ENCOUNTER (OUTPATIENT)
Facility: HOSPITAL | Age: 75
Discharge: HOME | End: 2025-08-15
Attending: INTERNAL MEDICINE | Admitting: INTERNAL MEDICINE
Payer: MEDICARE

## 2025-08-15 VITALS
OXYGEN SATURATION: 96 % | TEMPERATURE: 98 F | BODY MASS INDEX: 38.51 KG/M2 | WEIGHT: 260 LBS | RESPIRATION RATE: 20 BRPM | HEART RATE: 60 BPM | HEIGHT: 69 IN | SYSTOLIC BLOOD PRESSURE: 173 MMHG | DIASTOLIC BLOOD PRESSURE: 93 MMHG

## 2025-08-15 DIAGNOSIS — R93.3 ABNORMAL DIGESTIVE SYSTEM DIAGNOSTIC IMAGING: ICD-10-CM

## 2025-08-15 LAB
POCT GLUCOSE: 180 MG/DL (ref 70–110)
POCT GLUCOSE: 201 MG/DL (ref 70–110)

## 2025-08-15 PROCEDURE — 27202074 HC NEEDLE KNIFE, BILIARY: Performed by: INTERNAL MEDICINE

## 2025-08-15 PROCEDURE — 82962 GLUCOSE BLOOD TEST: CPT | Performed by: INTERNAL MEDICINE

## 2025-08-15 PROCEDURE — 43262 ENDO CHOLANGIOPANCREATOGRAPH: CPT | Mod: XS,,, | Performed by: INTERNAL MEDICINE

## 2025-08-15 PROCEDURE — 43264 ERCP REMOVE DUCT CALCULI: CPT | Performed by: INTERNAL MEDICINE

## 2025-08-15 PROCEDURE — 74329 X-RAY FOR PANCREAS ENDOSCOPY: CPT | Mod: TC | Performed by: INTERNAL MEDICINE

## 2025-08-15 PROCEDURE — 27202125 HC BALLOON, EXTRACTION (ANY): Performed by: INTERNAL MEDICINE

## 2025-08-15 PROCEDURE — 43276 ERCP STENT EXCHANGE W/DILATE: CPT | Mod: ,,, | Performed by: INTERNAL MEDICINE

## 2025-08-15 PROCEDURE — 37000009 HC ANESTHESIA EA ADD 15 MINS: Performed by: INTERNAL MEDICINE

## 2025-08-15 PROCEDURE — 25500020 PHARM REV CODE 255: Performed by: INTERNAL MEDICINE

## 2025-08-15 PROCEDURE — 63600175 PHARM REV CODE 636 W HCPCS: Performed by: NURSE ANESTHETIST, CERTIFIED REGISTERED

## 2025-08-15 PROCEDURE — 82962 GLUCOSE BLOOD TEST: CPT | Mod: 91 | Performed by: INTERNAL MEDICINE

## 2025-08-15 PROCEDURE — 27201674 HC SPHINCTERTOME: Performed by: INTERNAL MEDICINE

## 2025-08-15 PROCEDURE — C1769 GUIDE WIRE: HCPCS | Performed by: INTERNAL MEDICINE

## 2025-08-15 PROCEDURE — 25000003 PHARM REV CODE 250: Performed by: NURSE ANESTHETIST, CERTIFIED REGISTERED

## 2025-08-15 PROCEDURE — 43276 ERCP STENT EXCHANGE W/DILATE: CPT | Performed by: INTERNAL MEDICINE

## 2025-08-15 PROCEDURE — 25000003 PHARM REV CODE 250: Performed by: INTERNAL MEDICINE

## 2025-08-15 PROCEDURE — 43262 ENDO CHOLANGIOPANCREATOGRAPH: CPT | Mod: XS | Performed by: INTERNAL MEDICINE

## 2025-08-15 PROCEDURE — 74329 X-RAY FOR PANCREAS ENDOSCOPY: CPT | Mod: 26,,, | Performed by: INTERNAL MEDICINE

## 2025-08-15 PROCEDURE — 37000008 HC ANESTHESIA 1ST 15 MINUTES: Performed by: INTERNAL MEDICINE

## 2025-08-15 PROCEDURE — C2617 STENT, NON-COR, TEM W/O DEL: HCPCS | Performed by: INTERNAL MEDICINE

## 2025-08-15 PROCEDURE — 43264 ERCP REMOVE DUCT CALCULI: CPT | Mod: ,,, | Performed by: INTERNAL MEDICINE

## 2025-08-15 RX ORDER — ONDANSETRON HYDROCHLORIDE 2 MG/ML
4 INJECTION, SOLUTION INTRAVENOUS DAILY PRN
Status: DISCONTINUED | OUTPATIENT
Start: 2025-08-15 | End: 2025-08-15 | Stop reason: HOSPADM

## 2025-08-15 RX ORDER — FENTANYL CITRATE 50 UG/ML
INJECTION, SOLUTION INTRAMUSCULAR; INTRAVENOUS
Status: DISCONTINUED | OUTPATIENT
Start: 2025-08-15 | End: 2025-08-15

## 2025-08-15 RX ORDER — ONDANSETRON HYDROCHLORIDE 2 MG/ML
INJECTION, SOLUTION INTRAVENOUS
Status: DISCONTINUED | OUTPATIENT
Start: 2025-08-15 | End: 2025-08-15

## 2025-08-15 RX ORDER — INDOMETHACIN 50 MG/1
SUPPOSITORY RECTAL
Status: COMPLETED | OUTPATIENT
Start: 2025-08-15 | End: 2025-08-15

## 2025-08-15 RX ORDER — DEXAMETHASONE SODIUM PHOSPHATE 4 MG/ML
INJECTION, SOLUTION INTRA-ARTICULAR; INTRALESIONAL; INTRAMUSCULAR; INTRAVENOUS; SOFT TISSUE
Status: DISCONTINUED | OUTPATIENT
Start: 2025-08-15 | End: 2025-08-15

## 2025-08-15 RX ORDER — SODIUM CHLORIDE 9 MG/ML
INJECTION, SOLUTION INTRAVENOUS CONTINUOUS
Status: DISCONTINUED | OUTPATIENT
Start: 2025-08-15 | End: 2025-08-15 | Stop reason: HOSPADM

## 2025-08-15 RX ORDER — ROCURONIUM BROMIDE 10 MG/ML
INJECTION, SOLUTION INTRAVENOUS
Status: DISCONTINUED | OUTPATIENT
Start: 2025-08-15 | End: 2025-08-15

## 2025-08-15 RX ORDER — PROPOFOL 10 MG/ML
VIAL (ML) INTRAVENOUS
Status: DISCONTINUED | OUTPATIENT
Start: 2025-08-15 | End: 2025-08-15

## 2025-08-15 RX ORDER — GLUCAGON 1 MG
1 KIT INJECTION
Status: DISCONTINUED | OUTPATIENT
Start: 2025-08-15 | End: 2025-08-15 | Stop reason: HOSPADM

## 2025-08-15 RX ORDER — SODIUM CHLORIDE 0.9 % (FLUSH) 0.9 %
10 SYRINGE (ML) INJECTION
Status: DISCONTINUED | OUTPATIENT
Start: 2025-08-15 | End: 2025-08-15 | Stop reason: HOSPADM

## 2025-08-15 RX ORDER — LIDOCAINE HYDROCHLORIDE 20 MG/ML
INJECTION INTRAVENOUS
Status: DISCONTINUED | OUTPATIENT
Start: 2025-08-15 | End: 2025-08-15

## 2025-08-15 RX ADMIN — SUGAMMADEX 200 MG: 100 INJECTION, SOLUTION INTRAVENOUS at 02:08

## 2025-08-15 RX ADMIN — FENTANYL CITRATE 100 MCG: 50 INJECTION, SOLUTION INTRAMUSCULAR; INTRAVENOUS at 01:08

## 2025-08-15 RX ADMIN — ONDANSETRON 4 MG: 2 INJECTION INTRAMUSCULAR; INTRAVENOUS at 02:08

## 2025-08-15 RX ADMIN — SODIUM CHLORIDE: 0.9 INJECTION, SOLUTION INTRAVENOUS at 01:08

## 2025-08-15 RX ADMIN — PROPOFOL 10 MG: 10 INJECTION, EMULSION INTRAVENOUS at 02:08

## 2025-08-15 RX ADMIN — DEXAMETHASONE SODIUM PHOSPHATE 4 MG: 4 INJECTION, SOLUTION INTRAMUSCULAR; INTRAVENOUS at 02:08

## 2025-08-15 RX ADMIN — PROPOFOL 200 MG: 10 INJECTION, EMULSION INTRAVENOUS at 01:08

## 2025-08-15 RX ADMIN — PROPOFOL 20 MG: 10 INJECTION, EMULSION INTRAVENOUS at 02:08

## 2025-08-15 RX ADMIN — ROCURONIUM BROMIDE 50 MG: 10 INJECTION INTRAVENOUS at 01:08

## 2025-08-15 RX ADMIN — LIDOCAINE HYDROCHLORIDE 100 MG: 20 INJECTION INTRAVENOUS at 01:08

## 2025-08-16 ENCOUNTER — NURSE TRIAGE (OUTPATIENT)
Dept: ADMINISTRATIVE | Facility: CLINIC | Age: 75
End: 2025-08-16
Payer: MEDICARE

## 2025-08-18 ENCOUNTER — PATIENT MESSAGE (OUTPATIENT)
Dept: FAMILY MEDICINE | Facility: CLINIC | Age: 75
End: 2025-08-18
Payer: MEDICARE

## 2025-08-21 ENCOUNTER — OFFICE VISIT (OUTPATIENT)
Dept: UROLOGY | Facility: CLINIC | Age: 75
End: 2025-08-21
Payer: MEDICARE

## 2025-08-21 VITALS — WEIGHT: 254.88 LBS | HEIGHT: 69 IN | BODY MASS INDEX: 37.75 KG/M2

## 2025-08-21 DIAGNOSIS — N40.1 ENLARGED PROSTATE WITH URINARY OBSTRUCTION: Primary | ICD-10-CM

## 2025-08-21 DIAGNOSIS — N13.8 ENLARGED PROSTATE WITH URINARY OBSTRUCTION: Primary | ICD-10-CM

## 2025-08-21 LAB
BILIRUBIN, UA POC OHS: ABNORMAL
BLOOD, UA POC OHS: NEGATIVE
CLARITY, UA POC OHS: CLEAR
COLOR, UA POC OHS: YELLOW
GLUCOSE, UA POC OHS: 500
KETONES, UA POC OHS: NEGATIVE
LEUKOCYTES, UA POC OHS: NEGATIVE
NITRITE, UA POC OHS: NEGATIVE
PH, UA POC OHS: 6
POC RESIDUAL URINE VOLUME: 0 ML (ref 0–100)
PROTEIN, UA POC OHS: 30
SPECIFIC GRAVITY, UA POC OHS: <=1.005
UROBILINOGEN, UA POC OHS: 1

## 2025-08-21 PROCEDURE — 99999PBSHW POCT URINALYSIS(INSTRUMENT): Mod: PBBFAC,,,

## 2025-08-21 PROCEDURE — 99213 OFFICE O/P EST LOW 20 MIN: CPT | Mod: S$PBB,,, | Performed by: UROLOGY

## 2025-08-21 PROCEDURE — 99213 OFFICE O/P EST LOW 20 MIN: CPT | Mod: PBBFAC,PO,25 | Performed by: UROLOGY

## 2025-08-21 PROCEDURE — 81003 URINALYSIS AUTO W/O SCOPE: CPT | Mod: PBBFAC,PO | Performed by: UROLOGY

## 2025-08-21 PROCEDURE — 51798 US URINE CAPACITY MEASURE: CPT | Mod: PBBFAC,PO | Performed by: UROLOGY

## 2025-08-21 PROCEDURE — 99999PBSHW POCT BLADDER SCAN: Mod: PBBFAC,,,

## 2025-08-21 PROCEDURE — 99999 PR PBB SHADOW E&M-EST. PATIENT-LVL III: CPT | Mod: PBBFAC,,, | Performed by: UROLOGY

## (undated) DEVICE — DRAPE OPHTHALMIC 48X62 FEN

## (undated) DEVICE — Device

## (undated) DEVICE — SYR LUER LOCK 1CC

## (undated) DEVICE — SOL BETADINE 5%

## (undated) DEVICE — DRESSING TRANS 2X2 TEGADERM

## (undated) DEVICE — GLOVE BIOGEL ECLIPSE SZ 6.5

## (undated) DEVICE — DUOVISC

## (undated) DEVICE — EXPANDER PUPIL 7.0MM